# Patient Record
Sex: FEMALE | Race: BLACK OR AFRICAN AMERICAN | Employment: FULL TIME | URBAN - METROPOLITAN AREA
[De-identification: names, ages, dates, MRNs, and addresses within clinical notes are randomized per-mention and may not be internally consistent; named-entity substitution may affect disease eponyms.]

---

## 2017-01-19 ENCOUNTER — OFFICE VISIT (OUTPATIENT)
Dept: INTERNAL MEDICINE CLINIC | Facility: CLINIC | Age: 42
End: 2017-01-19

## 2017-01-19 VITALS
HEIGHT: 66 IN | WEIGHT: 217 LBS | BODY MASS INDEX: 34.87 KG/M2 | HEART RATE: 69 BPM | TEMPERATURE: 99 F | SYSTOLIC BLOOD PRESSURE: 118 MMHG | OXYGEN SATURATION: 97 % | DIASTOLIC BLOOD PRESSURE: 77 MMHG

## 2017-01-19 DIAGNOSIS — J40 BRONCHITIS: Primary | ICD-10-CM

## 2017-01-19 PROCEDURE — 99212 OFFICE O/P EST SF 10 MIN: CPT | Performed by: INTERNAL MEDICINE

## 2017-01-19 PROCEDURE — 99213 OFFICE O/P EST LOW 20 MIN: CPT | Performed by: INTERNAL MEDICINE

## 2017-01-19 RX ORDER — AZITHROMYCIN 250 MG/1
TABLET, FILM COATED ORAL
Qty: 6 TABLET | Refills: 0 | Status: SHIPPED | OUTPATIENT
Start: 2017-01-19 | End: 2017-12-08

## 2017-01-19 NOTE — PATIENT INSTRUCTIONS
What Is Acute Bronchitis? Acute or short-term bronchitis last for days or weeks. It occurs when the bronchial tubes (airways in the lungs) are irritated by a virus, bacteria, or allergen. This causes a cough that produces yellow or greenish mucus.   Insi · Tests to check for an underlying condition, such as allergies, asthma, or COPD. You may need to see a specialist for more lung function testing. Treating a cough  The main treatment for bronchitis is easing symptoms.  Avoiding smoke, allergens, and other · Wash your hands often. This helps reduce the chance of picking up viruses that cause colds and flu. Call your healthcare provider if:  · Symptoms worsen, or new symptoms develop. · Breathing problems worsen or  become severe.   · Symptoms don’t get bett A physical exam, health history, and certain tests help your healthcare provider make the diagnosis. Health history  Your healthcare provider will ask you about your symptoms. The exam  Your provider listens Estephanie Balbir chest for signs of congestion.  He or s · Ask your provider or pharmacist what side effects are common, and what to do about them. Follow-up care  You should see your provider again in 2 to 3 weeks. By this time, symptoms should have improved.  An infection that lasts longer may mean you have a

## 2017-01-19 NOTE — PROGRESS NOTES
HPI:    Patient ID: Vipin Tarango is a 46year old female. HPI she in  came today complaining of cough and congestion.   According to her started  last Monday initially with URI symptoms, congestion runny nose but her symptoms symptoms progressively ge Current Outpatient Prescriptions:  azithromycin 250 MG Oral Tab Take two tablets by mouth today, then one daily. Disp: 6 tablet Rfl: 0   Meloxicam 15 MG Oral Tab Take 1 tablet (15 mg total) by mouth daily.  Disp: 30 tablet Rfl: 1     Allergies:No Known Abdominal: Bowel sounds are normal. She exhibits no distension and no mass. There is no tenderness. There is no guarding. Musculoskeletal: Normal range of motion. She exhibits no edema or tenderness.    Lymphadenopathy:     She has no cervical adenopath

## 2017-11-23 ENCOUNTER — HOSPITAL ENCOUNTER (OUTPATIENT)
Age: 42
Discharge: HOME | End: 2017-11-23
Attending: FAMILY MEDICINE
Payer: COMMERCIAL

## 2017-11-23 ENCOUNTER — APPOINTMENT (OUTPATIENT)
Dept: GENERAL RADIOLOGY | Age: 42
End: 2017-11-23
Attending: FAMILY MEDICINE
Payer: COMMERCIAL

## 2017-11-23 VITALS
TEMPERATURE: 99 F | OXYGEN SATURATION: 100 % | HEART RATE: 77 BPM | BODY MASS INDEX: 36.16 KG/M2 | RESPIRATION RATE: 16 BRPM | DIASTOLIC BLOOD PRESSURE: 72 MMHG | HEIGHT: 66 IN | WEIGHT: 225 LBS | SYSTOLIC BLOOD PRESSURE: 115 MMHG
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
## 2017-11-23 DIAGNOSIS — M62.838 NECK MUSCLE SPASM: ICD-10-CM

## 2017-11-23 DIAGNOSIS — S46.912A SHOULDER STRAIN, LEFT, INITIAL ENCOUNTER: ICD-10-CM

## 2017-11-23 PROCEDURE — 84484 ASSAY OF TROPONIN QUANT: CPT

## 2017-11-23 PROCEDURE — 93005 ELECTROCARDIOGRAM TRACING: CPT

## 2017-11-23 PROCEDURE — 99205 OFFICE O/P NEW HI 60 MIN: CPT

## 2017-11-23 PROCEDURE — 99215 OFFICE O/P EST HI 40 MIN: CPT

## 2017-11-23 PROCEDURE — 85025 COMPLETE CBC W/AUTO DIFF WBC: CPT | Performed by: FAMILY MEDICINE

## 2017-11-23 PROCEDURE — 93010 ELECTROCARDIOGRAM REPORT: CPT

## 2017-11-23 PROCEDURE — 71020 XR CHEST PA + LAT CHEST (CPT=71020): CPT | Performed by: FAMILY MEDICINE

## 2017-11-23 PROCEDURE — 72050 X-RAY EXAM NECK SPINE 4/5VWS: CPT | Performed by: FAMILY MEDICINE

## 2017-11-23 PROCEDURE — 36415 COLL VENOUS BLD VENIPUNCTURE: CPT

## 2017-11-23 PROCEDURE — 80047 BASIC METABLC PNL IONIZED CA: CPT

## 2017-11-23 PROCEDURE — 93010 ELECTROCARDIOGRAM REPORT: CPT | Performed by: INTERNAL MEDICINE

## 2017-11-23 PROCEDURE — 73030 X-RAY EXAM OF SHOULDER: CPT | Performed by: FAMILY MEDICINE

## 2017-11-23 RX ORDER — NAPROXEN 375 MG/1
375 TABLET ORAL 2 TIMES DAILY WITH MEALS
Qty: 20 TABLET | Refills: 0 | Status: SHIPPED | OUTPATIENT
Start: 2017-11-23 | End: 2017-11-23

## 2017-11-23 RX ORDER — CYCLOBENZAPRINE HCL 10 MG
10 TABLET ORAL NIGHTLY
Qty: 7 TABLET | Refills: 0 | Status: SHIPPED | OUTPATIENT
Start: 2017-11-23 | End: 2017-11-30

## 2017-11-23 RX ORDER — NAPROXEN 375 MG/1
375 TABLET ORAL 2 TIMES DAILY WITH MEALS
Qty: 20 TABLET | Refills: 0 | Status: SHIPPED | OUTPATIENT
Start: 2017-11-23 | End: 2017-12-08

## 2017-11-23 RX ORDER — CYCLOBENZAPRINE HCL 10 MG
10 TABLET ORAL NIGHTLY
Qty: 7 TABLET | Refills: 0 | Status: SHIPPED | OUTPATIENT
Start: 2017-11-23 | End: 2017-11-23

## 2017-11-23 NOTE — ED PROVIDER NOTES
Patient Seen in: [facility]    History   Patient presents with:  Trauma (cardiovascular, musculoskeletal)  Upper Extremity Injury (musculoskeletal)  Headache (neurologic)  Head Neck Injury (neurologic, musculoskeletal)    S (5' 6\")   Wt 102.1 kg   LMP 11/07/2017   SpO2 100%   BMI 36.32 kg/m²         Physical Exam    General: Female in NAD  HEENT: NCAT, Bilateral TM clear. No hemotympanum. MMM  Neck: Mild TTP to the cervical spine. Mild TTP to the L>R Trapezius.  Limited ROM s 2:21 pm    Follow-up:  MD Elke Loja [phone] [phone]  [phone]    Go to  As needed, If symptoms worsen        Medications Prescribed:  Current Discharge Medication List    START taking these medications    naproxen 375 MG Oral Ta
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
## 2017-12-08 ENCOUNTER — OFFICE VISIT (OUTPATIENT)
Dept: INTERNAL MEDICINE CLINIC | Facility: CLINIC | Age: 42
End: 2017-12-08

## 2017-12-08 VITALS
RESPIRATION RATE: 18 BRPM | SYSTOLIC BLOOD PRESSURE: 114 MMHG | DIASTOLIC BLOOD PRESSURE: 72 MMHG | HEIGHT: 66 IN | WEIGHT: 226 LBS | HEART RATE: 83 BPM | BODY MASS INDEX: 36.32 KG/M2

## 2017-12-08 DIAGNOSIS — M54.2 CERVICALGIA: Primary | ICD-10-CM

## 2017-12-08 DIAGNOSIS — R42 VERTIGO: ICD-10-CM

## 2017-12-08 PROCEDURE — 99212 OFFICE O/P EST SF 10 MIN: CPT | Performed by: INTERNAL MEDICINE

## 2017-12-08 PROCEDURE — 99214 OFFICE O/P EST MOD 30 MIN: CPT | Performed by: INTERNAL MEDICINE

## 2017-12-08 RX ORDER — MELOXICAM 15 MG/1
15 TABLET ORAL DAILY
Qty: 30 TABLET | Refills: 0 | Status: SHIPPED | OUTPATIENT
Start: 2017-12-08 | End: 2018-01-21

## 2017-12-08 RX ORDER — TIZANIDINE 2 MG/1
2 TABLET ORAL NIGHTLY
Qty: 30 TABLET | Refills: 3 | Status: SHIPPED | OUTPATIENT
Start: 2017-12-08 | End: 2019-02-20
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
## 2017-12-09 NOTE — ASSESSMENT & PLAN NOTE
Persistent neck stiffness, limited range of movement. X-rays reviewed– reversal of the cervical lordosis seen. Muscle spasm seems to be the cause. She does have additional problems with neuroforaminal narrowing at C4-5 and 6.   Currently does not have an

## 2017-12-09 NOTE — PATIENT INSTRUCTIONS
Problem List Items Addressed This Visit        Unprioritized    Cervicalgia - Primary     Persistent neck stiffness, limited range of movement. X-rays reviewed– reversal of the cervical lordosis seen. Muscle spasm seems to be the cause.   She does have ad
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
## 2018-01-21 DIAGNOSIS — M54.2 CERVICALGIA: ICD-10-CM

## 2018-01-22 RX ORDER — MELOXICAM 15 MG/1
15 TABLET ORAL DAILY
Qty: 30 TABLET | Refills: 0 | Status: SHIPPED | OUTPATIENT
Start: 2018-01-22 | End: 2019-02-22

## 2018-03-22 ENCOUNTER — TELEPHONE (OUTPATIENT)
Dept: INTERNAL MEDICINE CLINIC | Facility: CLINIC | Age: 43
End: 2018-03-22

## 2018-03-22 DIAGNOSIS — R42 VERTIGO: ICD-10-CM

## 2018-03-22 DIAGNOSIS — M54.2 CERVICALGIA: Primary | ICD-10-CM

## 2018-03-22 NOTE — TELEPHONE ENCOUNTER
Dr Liz Rai, please see message below and advise regarding if okay to use SDS or MD approval slots for patient for follow up for further PT orders.

## 2018-03-22 NOTE — TELEPHONE ENCOUNTER
FYI: No available appt soon only SDS or MD approval is available, can we offer those appt?   Pls advise, thank you!!!

## 2018-03-22 NOTE — TELEPHONE ENCOUNTER
Patient was last seen on December 2017. This is March 22, 2018. She will need to be seen for any further orders.

## 2018-03-27 ENCOUNTER — OFFICE VISIT (OUTPATIENT)
Dept: PHYSICAL THERAPY | Facility: HOSPITAL | Age: 43
End: 2018-03-27
Attending: INTERNAL MEDICINE
Payer: COMMERCIAL

## 2018-03-27 DIAGNOSIS — R42 VERTIGO: ICD-10-CM

## 2018-03-27 DIAGNOSIS — M54.2 CERVICALGIA: ICD-10-CM

## 2018-03-27 PROCEDURE — 97161 PT EVAL LOW COMPLEX 20 MIN: CPT

## 2018-03-27 PROCEDURE — 97110 THERAPEUTIC EXERCISES: CPT

## 2018-03-27 NOTE — PROGRESS NOTES
CERVICAL SPINE EVALUATION:   Referring Physician: Alethea Castaneda MD    Date of Onset: 11/22/17  Date of Service: 3/27/18    Cervicalgia (M54.2)  Vertigo (R42)   SUBJECTIVE:   PATIENT SUMMARY:  Pretty Trevino is a 38year old y/o female who presents positive left neer's test. Pt scored 51%limitation on FOTO. Pt currently has difficulty with lifting and carrying, driving, reaching in front, exercising.    Gilford Lorenzo would benefit from skilled Physical Therapy to address the above impairments to improve fun >10# with minimal pain <2/10. Frequency/Duration: Patient will be seen for 2x/week or a total of 8 visits over a 90 day period.  Treatment will include: Manual Therapy, Neuromuscular Re-education, Therapeutic Activities, Therapeutic Exercise and Home Ex

## 2018-03-29 ENCOUNTER — OFFICE VISIT (OUTPATIENT)
Dept: PHYSICAL THERAPY | Facility: HOSPITAL | Age: 43
End: 2018-03-29
Attending: INTERNAL MEDICINE
Payer: COMMERCIAL

## 2018-03-29 PROCEDURE — 97110 THERAPEUTIC EXERCISES: CPT

## 2018-03-29 PROCEDURE — 97140 MANUAL THERAPY 1/> REGIONS: CPT

## 2018-03-29 NOTE — PROGRESS NOTES
Diagnosis: Cervicalgia (M54.2)  Authorized # of Visits:  2         Next MD visit: none scheduled  Fall Risk: standard         Precautions: n/a           Medication Changes since last visit?: No    Subjective: Pt states that neck and shoulder swelling have

## 2018-04-07 ENCOUNTER — TELEPHONE (OUTPATIENT)
Dept: PHYSICAL THERAPY | Facility: HOSPITAL | Age: 43
End: 2018-04-07

## 2018-04-10 ENCOUNTER — OFFICE VISIT (OUTPATIENT)
Dept: PHYSICAL THERAPY | Facility: HOSPITAL | Age: 43
End: 2018-04-10
Attending: INTERNAL MEDICINE
Payer: COMMERCIAL

## 2018-04-10 PROCEDURE — 97110 THERAPEUTIC EXERCISES: CPT

## 2018-04-10 PROCEDURE — 97140 MANUAL THERAPY 1/> REGIONS: CPT

## 2018-04-10 NOTE — PROGRESS NOTES
Diagnosis: Cervicalgia (M54.2)  Authorized # of Visits:  3         Next MD visit: none scheduled  Fall Risk: standard         Precautions: n/a           Medication Changes since last visit?: No    Subjective: Pt states that neck pain has been better and ha

## 2018-04-14 ENCOUNTER — OFFICE VISIT (OUTPATIENT)
Dept: PHYSICAL THERAPY | Facility: HOSPITAL | Age: 43
End: 2018-04-14
Attending: INTERNAL MEDICINE
Payer: COMMERCIAL

## 2018-04-14 PROCEDURE — 97110 THERAPEUTIC EXERCISES: CPT

## 2018-04-14 PROCEDURE — 97140 MANUAL THERAPY 1/> REGIONS: CPT

## 2018-04-14 NOTE — PROGRESS NOTES
Diagnosis: Cervicalgia (M54.2)  Authorized # of Visits:  4/12        Next MD visit: none scheduled  Fall Risk: standard         Precautions: n/a           Medication Changes since last visit?: No    Subjective: Pt states that neck pain has been better and Treatment Time: 41 min

## 2018-04-17 ENCOUNTER — OFFICE VISIT (OUTPATIENT)
Dept: PHYSICAL THERAPY | Facility: HOSPITAL | Age: 43
End: 2018-04-17
Attending: INTERNAL MEDICINE
Payer: COMMERCIAL

## 2018-04-17 PROCEDURE — 97110 THERAPEUTIC EXERCISES: CPT

## 2018-04-17 PROCEDURE — 97140 MANUAL THERAPY 1/> REGIONS: CPT

## 2018-04-18 NOTE — PROGRESS NOTES
Diagnosis: Cervicalgia (M54.2)  Authorized # of Visits:  5/12        Next MD visit: none scheduled  Fall Risk: standard         Precautions: n/a           Medication Changes since last visit?: No  Subjective: Reports her neck felt much better with rotation 41 min

## 2018-04-23 ENCOUNTER — APPOINTMENT (OUTPATIENT)
Dept: PHYSICAL THERAPY | Facility: HOSPITAL | Age: 43
End: 2018-04-23
Attending: INTERNAL MEDICINE
Payer: COMMERCIAL

## 2018-04-26 ENCOUNTER — OFFICE VISIT (OUTPATIENT)
Dept: PHYSICAL THERAPY | Facility: HOSPITAL | Age: 43
End: 2018-04-26
Attending: INTERNAL MEDICINE
Payer: COMMERCIAL

## 2018-04-26 PROCEDURE — 97140 MANUAL THERAPY 1/> REGIONS: CPT

## 2018-04-26 PROCEDURE — 97110 THERAPEUTIC EXERCISES: CPT

## 2018-04-26 NOTE — PROGRESS NOTES
Diagnosis: Cervicalgia (M54.2)  Authorized # of Visits: 12        Next MD visit: none scheduled  Fall Risk: standard         Precautions: n/a           Medication Changes since last visit?: No  Subjective: Moving neck better and has stiffness with mild martha

## 2018-05-03 ENCOUNTER — OFFICE VISIT (OUTPATIENT)
Dept: PHYSICAL THERAPY | Facility: HOSPITAL | Age: 43
End: 2018-05-03
Attending: INTERNAL MEDICINE
Payer: COMMERCIAL

## 2018-05-03 PROCEDURE — 97140 MANUAL THERAPY 1/> REGIONS: CPT

## 2018-05-03 PROCEDURE — 97110 THERAPEUTIC EXERCISES: CPT

## 2018-05-03 NOTE — PROGRESS NOTES
Diagnosis: Cervicalgia (M54.2)  Authorized # of Visits: 12        Next MD visit: none scheduled  Fall Risk: standard         Precautions: n/a           Medication Changes since last visit?: No  Subjective: (Pt arrived 15 min late to therapy session) Report min

## 2018-05-15 ENCOUNTER — OFFICE VISIT (OUTPATIENT)
Dept: PHYSICAL THERAPY | Facility: HOSPITAL | Age: 43
End: 2018-05-15
Attending: INTERNAL MEDICINE
Payer: COMMERCIAL

## 2018-05-15 PROCEDURE — 97140 MANUAL THERAPY 1/> REGIONS: CPT

## 2018-05-15 PROCEDURE — 97110 THERAPEUTIC EXERCISES: CPT

## 2018-05-15 NOTE — PROGRESS NOTES
Patient Name: Hossein Omalley, : 10/9/1975, MRN: [id number]   Date:  5/15/2018  Referring Physician:  Jose Arias    Diagnosis: Cervicalgia (M54.2)    Discharge Summary    Pt has attended 8, cancelled 2, and no shown 1 visits in Physical Therapy. demonstrate left shoulder abd/flex ROM >130 deg to be able to perform ADLs such as washing hair. (MET)  4.  Pt will demonstrate L shoulder/scapular strength >4/5 to be able to lift and carry >10# with minimal pain <2/10. (MET)    FOTO: 62/100 at DC, 49/100

## 2018-05-26 ENCOUNTER — APPOINTMENT (OUTPATIENT)
Dept: PHYSICAL THERAPY | Facility: HOSPITAL | Age: 43
End: 2018-05-26
Attending: INTERNAL MEDICINE
Payer: COMMERCIAL

## 2019-02-19 ENCOUNTER — NURSE TRIAGE (OUTPATIENT)
Dept: OTHER | Age: 44
End: 2019-02-19

## 2019-02-19 DIAGNOSIS — M54.2 CERVICALGIA: ICD-10-CM

## 2019-02-19 NOTE — TELEPHONE ENCOUNTER
Action Requested: Summary for Provider     []  Critical Lab, Recommendations Needed  [] Need Additional Advice  []   FYI    []   Need Orders  [] Need Medications Sent to Pharmacy  []  Other     SUMMARY: appt scheduled 2/20/19 w/ PA as no access today w/ an

## 2019-02-20 ENCOUNTER — OFFICE VISIT (OUTPATIENT)
Dept: INTERNAL MEDICINE CLINIC | Facility: CLINIC | Age: 44
End: 2019-02-20
Payer: COMMERCIAL

## 2019-02-20 ENCOUNTER — LAB ENCOUNTER (OUTPATIENT)
Dept: LAB | Facility: HOSPITAL | Age: 44
End: 2019-02-20
Attending: PHYSICIAN ASSISTANT
Payer: COMMERCIAL

## 2019-02-20 VITALS
HEIGHT: 66 IN | BODY MASS INDEX: 36.32 KG/M2 | WEIGHT: 226 LBS | SYSTOLIC BLOOD PRESSURE: 119 MMHG | DIASTOLIC BLOOD PRESSURE: 79 MMHG | HEART RATE: 70 BPM

## 2019-02-20 DIAGNOSIS — N92.0 MENORRHAGIA WITH REGULAR CYCLE: ICD-10-CM

## 2019-02-20 DIAGNOSIS — Z00.00 ROUTINE PHYSICAL EXAMINATION: Primary | ICD-10-CM

## 2019-02-20 DIAGNOSIS — R35.0 URINARY FREQUENCY: ICD-10-CM

## 2019-02-20 DIAGNOSIS — Z12.31 VISIT FOR SCREENING MAMMOGRAM: ICD-10-CM

## 2019-02-20 DIAGNOSIS — M25.50 PAIN IN JOINT, MULTIPLE SITES: ICD-10-CM

## 2019-02-20 DIAGNOSIS — Z00.00 ROUTINE PHYSICAL EXAMINATION: ICD-10-CM

## 2019-02-20 DIAGNOSIS — E55.9 VITAMIN D DEFICIENCY: ICD-10-CM

## 2019-02-20 PROBLEM — J40 BRONCHITIS: Status: RESOLVED | Noted: 2017-01-19 | Resolved: 2019-02-20

## 2019-02-20 LAB
ALBUMIN SERPL-MCNC: 3.9 G/DL (ref 3.4–5)
ALBUMIN/GLOB SERPL: 0.8 {RATIO} (ref 1–2)
ALP LIVER SERPL-CCNC: 79 U/L (ref 37–98)
ALT SERPL-CCNC: 19 U/L (ref 13–56)
ANION GAP SERPL CALC-SCNC: 7 MMOL/L (ref 0–18)
APPEARANCE: CLEAR
AST SERPL-CCNC: 18 U/L (ref 15–37)
BASOPHILS # BLD AUTO: 0.02 X10(3) UL (ref 0–0.2)
BASOPHILS NFR BLD AUTO: 0.2 %
BILIRUB SERPL-MCNC: 0.3 MG/DL (ref 0.1–2)
BILIRUBIN: NEGATIVE
BUN BLD-MCNC: 12 MG/DL (ref 7–18)
BUN/CREAT SERPL: 10.8 (ref 10–20)
CALCIUM BLD-MCNC: 9.8 MG/DL (ref 8.5–10.1)
CHLORIDE SERPL-SCNC: 108 MMOL/L (ref 98–107)
CHOLEST SMN-MCNC: 188 MG/DL (ref ?–200)
CO2 SERPL-SCNC: 27 MMOL/L (ref 21–32)
CREAT BLD-MCNC: 1.11 MG/DL (ref 0.55–1.02)
CRP SERPL-MCNC: 0.52 MG/DL (ref ?–0.3)
DEPRECATED RDW RBC AUTO: 46.4 FL (ref 35.1–46.3)
EOSINOPHIL # BLD AUTO: 0.03 X10(3) UL (ref 0–0.7)
EOSINOPHIL NFR BLD AUTO: 0.3 %
ERYTHROCYTE [DISTWIDTH] IN BLOOD BY AUTOMATED COUNT: 14.5 % (ref 11–15)
ERYTHROCYTE [SEDIMENTATION RATE] IN BLOOD: 44 MM/HR (ref 0–20)
GLOBULIN PLAS-MCNC: 4.7 G/DL (ref 2.8–4.4)
GLUCOSE (URINE DIPSTICK): NEGATIVE MG/DL
GLUCOSE BLD-MCNC: 99 MG/DL (ref 70–99)
HCT VFR BLD AUTO: 36.5 % (ref 35–48)
HDLC SERPL-MCNC: 75 MG/DL (ref 40–59)
HGB BLD-MCNC: 11.7 G/DL (ref 12–16)
IMM GRANULOCYTES # BLD AUTO: 0.03 X10(3) UL (ref 0–1)
IMM GRANULOCYTES NFR BLD: 0.3 %
KETONES (URINE DIPSTICK): NEGATIVE MG/DL
LDLC SERPL CALC-MCNC: 101 MG/DL (ref ?–100)
LEUKOCYTES: NEGATIVE
LYMPHOCYTES # BLD AUTO: 3.22 X10(3) UL (ref 1–4)
LYMPHOCYTES NFR BLD AUTO: 35.6 %
M PROTEIN MFR SERPL ELPH: 8.6 G/DL (ref 6.4–8.2)
MCH RBC QN AUTO: 28.2 PG (ref 26–34)
MCHC RBC AUTO-ENTMCNC: 32.1 G/DL (ref 31–37)
MCV RBC AUTO: 88 FL (ref 80–100)
MONOCYTES # BLD AUTO: 0.56 X10(3) UL (ref 0.1–1)
MONOCYTES NFR BLD AUTO: 6.2 %
MULTISTIX LOT#: NORMAL NUMERIC
NEUTROPHILS # BLD AUTO: 5.19 X10 (3) UL (ref 1.5–7.7)
NEUTROPHILS # BLD AUTO: 5.19 X10(3) UL (ref 1.5–7.7)
NEUTROPHILS NFR BLD AUTO: 57.4 %
NITRITE, URINE: NEGATIVE
NONHDLC SERPL-MCNC: 113 MG/DL (ref ?–130)
OCCULT BLOOD: NEGATIVE
OSMOLALITY SERPL CALC.SUM OF ELEC: 294 MOSM/KG (ref 275–295)
PH, URINE: 6.5 (ref 4.5–8)
PLATELET # BLD AUTO: 282 10(3)UL (ref 150–450)
POTASSIUM SERPL-SCNC: 4.1 MMOL/L (ref 3.5–5.1)
PROTEIN (URINE DIPSTICK): NEGATIVE MG/DL
RBC # BLD AUTO: 4.15 X10(6)UL (ref 3.8–5.3)
RHEUMATOID FACT SERPL-ACNC: <10 IU/ML (ref ?–15)
SODIUM SERPL-SCNC: 142 MMOL/L (ref 136–145)
SPECIFIC GRAVITY: 1.02 (ref 1–1.03)
TRIGL SERPL-MCNC: 59 MG/DL (ref 30–149)
TSI SER-ACNC: 1.5 MIU/ML (ref 0.36–3.74)
URINE-COLOR: YELLOW
UROBILINOGEN,SEMI-QN: NEGATIVE MG/DL (ref 0–1.9)
WBC # BLD AUTO: 9.1 X10(3) UL (ref 4–11)

## 2019-02-20 PROCEDURE — 85652 RBC SED RATE AUTOMATED: CPT

## 2019-02-20 PROCEDURE — 81002 URINALYSIS NONAUTO W/O SCOPE: CPT | Performed by: PHYSICIAN ASSISTANT

## 2019-02-20 PROCEDURE — 80053 COMPREHEN METABOLIC PANEL: CPT

## 2019-02-20 PROCEDURE — 86140 C-REACTIVE PROTEIN: CPT

## 2019-02-20 PROCEDURE — 36415 COLL VENOUS BLD VENIPUNCTURE: CPT

## 2019-02-20 PROCEDURE — 86038 ANTINUCLEAR ANTIBODIES: CPT

## 2019-02-20 PROCEDURE — 86431 RHEUMATOID FACTOR QUANT: CPT

## 2019-02-20 PROCEDURE — 99396 PREV VISIT EST AGE 40-64: CPT | Performed by: PHYSICIAN ASSISTANT

## 2019-02-20 PROCEDURE — 82306 VITAMIN D 25 HYDROXY: CPT

## 2019-02-20 PROCEDURE — 85025 COMPLETE CBC W/AUTO DIFF WBC: CPT

## 2019-02-20 PROCEDURE — 80061 LIPID PANEL: CPT

## 2019-02-20 PROCEDURE — 84443 ASSAY THYROID STIM HORMONE: CPT

## 2019-02-20 NOTE — PROGRESS NOTES
HPI:    Patient ID: Sheila Young is a 41year old female. HPI   Patient presents today requesting physical exam. Was last seen in the office on 12/8/17 for neck pain and vertigo. Since then has completed several sessions of PT and doing well.  Ov Positive for myalgias, back pain and joint pain. Negative for gait problem. Skin: Negative for rash. Neurological: Negative for weakness, light-headedness and headaches. Hematological: Negative for adenopathy.    Psychiatric/Behavioral: Negative for d palpation of multiple small joints of the hand and wrist.  Check fasting labs and contact patient with results. Health maintenance issues discussed.   Return to clinic in the near future for breast exam and pelvic exam with Pap as these were deferred today the past.  Recheck levels and treat accordingly. - VITAMIN D, 25-HYDROXY; Future    6. Visit for screening mammogram  Screening mammogram ordered. - [facility] SCREENING BILAT (CPT=77067);  Future      Orders Placed This Encounter      POC Urinalysis, Manual D

## 2019-02-20 NOTE — TELEPHONE ENCOUNTER
No Protocol on this med.         Future Appointments   Date Time Provider Joshua Brambila   2/20/2019  2:00 PM Florian Estevez [facility]         Requested Prescriptions     Pending Prescriptions Disp Refills   • Meloxicam 15 MG Oral Tab 30 table

## 2019-02-21 LAB — NUCLEAR IGG TITR SER IF: NEGATIVE {TITER}

## 2019-02-22 DIAGNOSIS — M54.2 CERVICALGIA: ICD-10-CM

## 2019-02-22 LAB — 25(OH)D3 SERPL-MCNC: 11.2 NG/ML (ref 30–100)

## 2019-02-23 RX ORDER — MELOXICAM 15 MG/1
15 TABLET ORAL DAILY
Qty: 30 TABLET | Refills: 0 | OUTPATIENT
Start: 2019-02-23

## 2019-02-23 RX ORDER — MELOXICAM 15 MG/1
15 TABLET ORAL DAILY
Qty: 30 TABLET | Refills: 0 | Status: SHIPPED | OUTPATIENT
Start: 2019-02-23 | End: 2019-06-06

## 2019-02-23 RX ORDER — ERGOCALCIFEROL 1.25 MG/1
50000 CAPSULE ORAL
Qty: 4 CAPSULE | Refills: 2 | OUTPATIENT
Start: 2019-02-23 | End: 2019-05-12

## 2019-02-23 RX ORDER — MELOXICAM 15 MG/1
15 TABLET ORAL DAILY
Qty: 30 TABLET | Refills: 0 | Status: SHIPPED | OUTPATIENT
Start: 2019-02-23 | End: 2019-03-22

## 2019-02-28 ENCOUNTER — TELEPHONE (OUTPATIENT)
Dept: INTERNAL MEDICINE CLINIC | Facility: CLINIC | Age: 44
End: 2019-02-28

## 2019-02-28 RX ORDER — ERGOCALCIFEROL 1.25 MG/1
50000 CAPSULE ORAL
Qty: 4 CAPSULE | Refills: 2 | Status: SHIPPED | OUTPATIENT
Start: 2019-02-28 | End: 2019-05-17

## 2019-02-28 NOTE — TELEPHONE ENCOUNTER
Pt called in stating medication below was sent to the wrong pharmacy and would like it re-sent to the [facility] (on chart)    Please advise.     Current Outpatient Medications:   •  ergocalciferol 94395 units Oral Cap, Take 1 capsule (50,000 Units

## 2019-03-22 DIAGNOSIS — M54.2 CERVICALGIA: ICD-10-CM

## 2019-03-23 NOTE — TELEPHONE ENCOUNTER
Please advise in regards to refill request. Thank You  Refill Protocol Appointment Criteria  · Appointment scheduled in the past 6 months or in the next 3 months  Recent Outpatient Visits            1 month ago Routine physical examination    [facility]

## 2019-03-28 RX ORDER — MELOXICAM 15 MG/1
TABLET ORAL
Qty: 30 TABLET | Refills: 5 | Status: SHIPPED | OUTPATIENT
Start: 2019-03-28 | End: 2019-05-31

## 2019-05-31 ENCOUNTER — HOSPITAL ENCOUNTER (OUTPATIENT)
Age: 44
Discharge: HOME | End: 2019-05-31
Attending: EMERGENCY MEDICINE
Payer: COMMERCIAL

## 2019-05-31 ENCOUNTER — NURSE TRIAGE (OUTPATIENT)
Dept: OTHER | Age: 44
End: 2019-05-31

## 2019-05-31 ENCOUNTER — APPOINTMENT (OUTPATIENT)
Dept: GENERAL RADIOLOGY | Age: 44
End: 2019-05-31
Attending: EMERGENCY MEDICINE
Payer: COMMERCIAL

## 2019-05-31 VITALS
DIASTOLIC BLOOD PRESSURE: 72 MMHG | HEART RATE: 73 BPM | HEIGHT: 66 IN | WEIGHT: 225 LBS | TEMPERATURE: 98 F | SYSTOLIC BLOOD PRESSURE: 125 MMHG | BODY MASS INDEX: 36.16 KG/M2 | OXYGEN SATURATION: 100 % | RESPIRATION RATE: 22 BRPM

## 2019-05-31 DIAGNOSIS — S93.401A SPRAIN OF RIGHT ANKLE, UNSPECIFIED LIGAMENT, INITIAL ENCOUNTER: Primary | ICD-10-CM

## 2019-05-31 PROCEDURE — 73610 X-RAY EXAM OF ANKLE: CPT | Performed by: EMERGENCY MEDICINE

## 2019-05-31 PROCEDURE — 99213 OFFICE O/P EST LOW 20 MIN: CPT

## 2019-05-31 PROCEDURE — 73630 X-RAY EXAM OF FOOT: CPT | Performed by: EMERGENCY MEDICINE

## 2019-05-31 RX ORDER — IBUPROFEN 600 MG/1
600 TABLET ORAL ONCE
Status: COMPLETED | OUTPATIENT
Start: 2019-05-31 | End: 2019-05-31

## 2019-05-31 NOTE — TELEPHONE ENCOUNTER
Action Requested: Summary for Provider     []  Critical Lab, Recommendations Needed  [] Need Additional Advice  []   FYI    []   Need Orders  [] Need Medications Sent to Pharmacy  []  Other     SUMMARY:  Transferred to [facility] care visit cooridinator for

## 2019-05-31 NOTE — ED PROVIDER NOTES
Patient Seen in: [address]    History   Patient presents with:  Lower Extremity Injury (musculoskeletal)    Stated Complaint:     HPI    Patient is a 65-year-old female who presents to immediate care complaining of acut Cardiovascular: Normal rate, regular rhythm and normal heart sounds. No murmur heard. Pulmonary/Chest: Effort normal and breath sounds normal.   Abdominal: Soft. Musculoskeletal: Normal range of motion. Right foot: There is tenderness.  There is

## 2019-05-31 NOTE — ED INITIAL ASSESSMENT (HPI)
Pt presents to the IC with c/o right ankle pain s/p working out and jumping off a step. Pt notes icing it immediately but heard a crack.

## 2019-06-04 NOTE — TELEPHONE ENCOUNTER
Call center please call and schedule an appointment. Thank You. ED/IC AVS (Printed 5/31/2019)                   Follow-Ups: Follow up with Bianca Gillette MD (Internal Medicine) in 1 week (6/7/2019);  As needed

## 2019-06-05 DIAGNOSIS — M54.2 CERVICALGIA: ICD-10-CM

## 2019-06-05 NOTE — TELEPHONE ENCOUNTER
Dr Benites=rima Du Rn Triage 44 minutes ago (9:06 AM)     Pt declined an appt at the time.      Routing comment

## 2019-06-05 NOTE — TELEPHONE ENCOUNTER
Pt is requesting a refill on :      Current Outpatient Medications:  Meloxicam 15 MG Oral Tab Take 1 tablet (15 mg total) by mouth daily.  Disp: 30 tablet Rfl: 0

## 2019-06-06 RX ORDER — MELOXICAM 15 MG/1
15 TABLET ORAL DAILY
Qty: 90 TABLET | Refills: 1 | Status: SHIPPED | OUTPATIENT
Start: 2019-06-06 | End: 2020-03-17

## 2019-06-06 NOTE — TELEPHONE ENCOUNTER
Refill Protocol Appointment Criteria  · Appointment scheduled in the past 6 months or in the next 3 months  Recent Outpatient Visits            3 months ago Routine physical examination    [facility] [place], [facility], [address], [facility]

## 2019-07-20 ENCOUNTER — NURSE TRIAGE (OUTPATIENT)
Dept: OTHER | Age: 44
End: 2019-07-20

## 2019-07-20 NOTE — TELEPHONE ENCOUNTER
Action Requested: Summary for Provider     []  Critical Lab, Recommendations Needed  [] Need Additional Advice  []   FYI    []   Need Orders  [] Need Medications Sent to Pharmacy  []  Other     SUMMARY:   Patient called stating she sprained her right ankle

## 2019-07-22 ENCOUNTER — TELEPHONE (OUTPATIENT)
Dept: INTERNAL MEDICINE CLINIC | Facility: CLINIC | Age: 44
End: 2019-07-22

## 2019-07-22 ENCOUNTER — OFFICE VISIT (OUTPATIENT)
Dept: INTERNAL MEDICINE CLINIC | Facility: CLINIC | Age: 44
End: 2019-07-22
Payer: COMMERCIAL

## 2019-07-22 ENCOUNTER — HOSPITAL ENCOUNTER (OUTPATIENT)
Dept: GENERAL RADIOLOGY | Facility: HOSPITAL | Age: 44
Discharge: HOME | End: 2019-07-22
Attending: NURSE PRACTITIONER
Payer: COMMERCIAL

## 2019-07-22 ENCOUNTER — APPOINTMENT (OUTPATIENT)
Dept: LAB | Facility: HOSPITAL | Age: 44
End: 2019-07-22
Attending: NURSE PRACTITIONER
Payer: COMMERCIAL

## 2019-07-22 VITALS
HEIGHT: 66 IN | SYSTOLIC BLOOD PRESSURE: 113 MMHG | HEART RATE: 61 BPM | WEIGHT: 234 LBS | BODY MASS INDEX: 37.61 KG/M2 | DIASTOLIC BLOOD PRESSURE: 73 MMHG

## 2019-07-22 DIAGNOSIS — M25.561 PAIN IN JOINT OF RIGHT KNEE: ICD-10-CM

## 2019-07-22 DIAGNOSIS — E55.9 VITAMIN D DEFICIENCY DISEASE: ICD-10-CM

## 2019-07-22 DIAGNOSIS — M25.561 PAIN IN JOINT OF RIGHT KNEE: Primary | ICD-10-CM

## 2019-07-22 DIAGNOSIS — E55.9 VITAMIN D DEFICIENCY: ICD-10-CM

## 2019-07-22 DIAGNOSIS — M25.561 KNEE PAIN, RIGHT ANTERIOR: ICD-10-CM

## 2019-07-22 LAB
25(OH)D3 SERPL-MCNC: 11.7 NG/ML (ref 30–100)
CRP SERPL-MCNC: 0.47 MG/DL (ref ?–0.3)
ERYTHROCYTE [SEDIMENTATION RATE] IN BLOOD: 35 MM/HR (ref 0–20)

## 2019-07-22 PROCEDURE — 86140 C-REACTIVE PROTEIN: CPT

## 2019-07-22 PROCEDURE — 99212 OFFICE O/P EST SF 10 MIN: CPT | Performed by: NURSE PRACTITIONER

## 2019-07-22 PROCEDURE — 36415 COLL VENOUS BLD VENIPUNCTURE: CPT

## 2019-07-22 PROCEDURE — 85652 RBC SED RATE AUTOMATED: CPT

## 2019-07-22 PROCEDURE — 82306 VITAMIN D 25 HYDROXY: CPT

## 2019-07-22 PROCEDURE — 73560 X-RAY EXAM OF KNEE 1 OR 2: CPT | Performed by: NURSE PRACTITIONER

## 2019-07-22 NOTE — ASSESSMENT & PLAN NOTE
A/P Patient complains of right knee pain and ankle swelling. She recently sprained her right ankle the first week in June. Mild edema noted right ankle. Complains of joint swelling and pain.   Since she was off of her meloxicam she needs to take meloxicam

## 2019-07-22 NOTE — PROGRESS NOTES
HPI:    Patient ID: Parag Westbrook is a 41year old female. HPI  Patient states that she sprained her right ankle first week in June. She was in exercise class and she jumped off a step and heard a pop.  She states the pain in her right ankle is ge The patient is not nervous/anxious. Current Outpatient Medications:  Meloxicam 15 MG Oral Tab Take 1 tablet (15 mg total) by mouth daily.  Disp: 90 tablet Rfl: 1     Allergies:No Known Allergies   PHYSICAL EXAM:   Physical Exam    Constitution joint of right knee    -  Primary    Relevant Orders    SED RATE, WESTERGREN (AUTOMATED) (Completed)    XR KNEE (1 OR 2 VIEWS), RIGHT (CPT=73560) (Completed)    C-REACTIVE PROTEIN (Completed)    Vitamin D deficiency disease        Relevant Orders    VITAMI

## 2019-07-23 NOTE — TELEPHONE ENCOUNTER
Can Best    Left message that I will put her lab results on my chart and that she has a prescription for Vitamin D 50,000 units once a week for 12 weeks.     Willow Severs, ANP  Working with Dr. Sybil Long

## 2020-03-17 ENCOUNTER — NURSE TRIAGE (OUTPATIENT)
Dept: INTERNAL MEDICINE CLINIC | Facility: CLINIC | Age: 45
End: 2020-03-17

## 2020-03-17 DIAGNOSIS — M54.2 CERVICALGIA: ICD-10-CM

## 2020-03-17 RX ORDER — FLUTICASONE PROPIONATE 50 MCG
SPRAY, SUSPENSION (ML) NASAL
Qty: 1 BOTTLE | Refills: 0 | Status: SHIPPED | OUTPATIENT
Start: 2020-03-17 | End: 2020-04-13

## 2020-03-17 RX ORDER — AZITHROMYCIN 250 MG/1
TABLET, FILM COATED ORAL
Qty: 1 PACKAGE | Refills: 0 | Status: SHIPPED | OUTPATIENT
Start: 2020-03-17 | End: 2020-04-20

## 2020-03-17 RX ORDER — MELOXICAM 15 MG/1
15 TABLET ORAL DAILY
Qty: 90 TABLET | Refills: 0 | Status: SHIPPED | OUTPATIENT
Start: 2020-03-17 | End: 2020-06-10

## 2020-03-17 NOTE — TELEPHONE ENCOUNTER
Jeni as directed. Zyrtec over-the-counter–10 mg 1 tablet once daily for 4 weeks. Flonase 1 puff each nostril 2 times daily for 4 weeks.

## 2020-03-17 NOTE — TELEPHONE ENCOUNTER
Action Requested: Summary for Provider     []  Critical Lab, Recommendations Needed  [] Need Additional Advice  []   FYI    []   Need Orders  [x] Need Medications Sent to Pharmacy  []  Other     SUMMARY: Patient is requesting a medication for her headache

## 2020-03-17 NOTE — TELEPHONE ENCOUNTER
Pt was called and informed of your message below and she verbalized understanding. Medication have been send to her pharmacy.

## 2020-04-13 RX ORDER — FLUTICASONE PROPIONATE 50 MCG
SPRAY, SUSPENSION (ML) NASAL
Qty: 3 BOTTLE | Refills: 0 | Status: SHIPPED | OUTPATIENT
Start: 2020-04-13 | End: 2020-08-05

## 2020-04-20 ENCOUNTER — NURSE TRIAGE (OUTPATIENT)
Dept: INTERNAL MEDICINE CLINIC | Facility: CLINIC | Age: 45
End: 2020-04-20

## 2020-04-20 DIAGNOSIS — R05.9 COUGH: Primary | ICD-10-CM

## 2020-04-20 DIAGNOSIS — Z20.822 SUSPECTED COVID-19 VIRUS INFECTION: ICD-10-CM

## 2020-04-20 PROCEDURE — 99213 OFFICE O/P EST LOW 20 MIN: CPT | Performed by: INTERNAL MEDICINE

## 2020-04-20 RX ORDER — LEVOCETIRIZINE DIHYDROCHLORIDE 5 MG/1
5 TABLET, FILM COATED ORAL EVERY EVENING
Qty: 30 TABLET | Refills: 3 | Status: SHIPPED | OUTPATIENT
Start: 2020-04-20 | End: 2020-07-13

## 2020-04-20 RX ORDER — AZITHROMYCIN 250 MG/1
TABLET, FILM COATED ORAL
Qty: 6 TABLET | Refills: 0 | Status: SHIPPED | OUTPATIENT
Start: 2020-04-20 | End: 2020-04-25

## 2020-04-20 NOTE — TELEPHONE ENCOUNTER
Action Requested: Summary for Provider     []  Critical Lab, Recommendations Needed  [x] Need Additional Advice  []   FYI    []   Need Orders  [] Need Medications Sent to Pharmacy  []  Other     SUMMARY: pt thinks she may have another sinus infection, stat

## 2020-04-20 NOTE — TELEPHONE ENCOUNTER
Virtual Telephone Check-In    Luz Maria Levels verbally consents to a Virtual/Telephone Check-In visit on 04/20/20.     Patient understands and accepts financial responsibility for any deductible, co-insurance and/or co-pays associated with this service

## 2020-04-20 NOTE — ASSESSMENT & PLAN NOTE
Symptoms of headache, sinus pressure, postnasal drip and a mild sore throat that started about 5 days back. Gradually worsened and has been having persistent hacking dry cough for the past 2 days. Mild sore throat associated.   Has been having photophobia

## 2020-04-21 ENCOUNTER — LAB ENCOUNTER (OUTPATIENT)
Dept: LAB | Facility: HOSPITAL | Age: 45
End: 2020-04-21
Attending: INTERNAL MEDICINE
Payer: COMMERCIAL

## 2020-04-21 DIAGNOSIS — R05.9 COUGH: ICD-10-CM

## 2020-04-21 DIAGNOSIS — Z20.822 SUSPECTED COVID-19 VIRUS INFECTION: ICD-10-CM

## 2020-06-10 DIAGNOSIS — M54.2 CERVICALGIA: ICD-10-CM

## 2020-06-10 RX ORDER — MELOXICAM 15 MG/1
TABLET ORAL
Qty: 90 TABLET | Refills: 0 | Status: SHIPPED | OUTPATIENT
Start: 2020-06-10 | End: 2020-08-10

## 2020-07-13 RX ORDER — LEVOCETIRIZINE DIHYDROCHLORIDE 5 MG/1
TABLET, FILM COATED ORAL
Qty: 90 TABLET | Refills: 1 | Status: SHIPPED | OUTPATIENT
Start: 2020-07-13 | End: 2021-03-18 | Stop reason: ALTCHOICE

## 2020-07-17 ENCOUNTER — OFFICE VISIT (OUTPATIENT)
Dept: FAMILY MEDICINE CLINIC | Facility: CLINIC | Age: 45
End: 2020-07-17
Payer: COMMERCIAL

## 2020-07-17 ENCOUNTER — NURSE TRIAGE (OUTPATIENT)
Dept: INTERNAL MEDICINE CLINIC | Facility: CLINIC | Age: 45
End: 2020-07-17

## 2020-07-17 VITALS
SYSTOLIC BLOOD PRESSURE: 116 MMHG | TEMPERATURE: 97 F | BODY MASS INDEX: 38.41 KG/M2 | HEART RATE: 77 BPM | HEIGHT: 66 IN | WEIGHT: 239 LBS | DIASTOLIC BLOOD PRESSURE: 78 MMHG

## 2020-07-17 DIAGNOSIS — E66.9 CLASS 2 OBESITY WITHOUT SERIOUS COMORBIDITY WITH BODY MASS INDEX (BMI) OF 38.0 TO 38.9 IN ADULT, UNSPECIFIED OBESITY TYPE: ICD-10-CM

## 2020-07-17 DIAGNOSIS — M17.11 OSTEOARTHRITIS OF RIGHT KNEE, UNSPECIFIED OSTEOARTHRITIS TYPE: ICD-10-CM

## 2020-07-17 DIAGNOSIS — M25.561 CHRONIC PAIN OF RIGHT KNEE: Primary | ICD-10-CM

## 2020-07-17 DIAGNOSIS — G89.29 CHRONIC PAIN OF RIGHT KNEE: Primary | ICD-10-CM

## 2020-07-17 PROCEDURE — 3074F SYST BP LT 130 MM HG: CPT | Performed by: STUDENT IN AN ORGANIZED HEALTH CARE EDUCATION/TRAINING PROGRAM

## 2020-07-17 PROCEDURE — 99214 OFFICE O/P EST MOD 30 MIN: CPT | Performed by: STUDENT IN AN ORGANIZED HEALTH CARE EDUCATION/TRAINING PROGRAM

## 2020-07-17 PROCEDURE — 3008F BODY MASS INDEX DOCD: CPT | Performed by: STUDENT IN AN ORGANIZED HEALTH CARE EDUCATION/TRAINING PROGRAM

## 2020-07-17 PROCEDURE — 3078F DIAST BP <80 MM HG: CPT | Performed by: STUDENT IN AN ORGANIZED HEALTH CARE EDUCATION/TRAINING PROGRAM

## 2020-07-17 RX ORDER — IBUPROFEN 800 MG/1
800 TABLET ORAL EVERY 6 HOURS PRN
Qty: 45 TABLET | Refills: 0 | Status: SHIPPED | OUTPATIENT
Start: 2020-07-17 | End: 2020-10-08

## 2020-07-17 NOTE — PROGRESS NOTES
HPI:    Patient ID: Keila Diaz is a 45year old female. HPI  Pt presenting with acute on chronic Right knee pain.  Pt has history of osteoarthritis, has been taking Meloxicam daily but notes that anterior knee pain has worsened over the last 3 Normocephalic and atraumatic.       Right Ear: External ear normal.      Left Ear: External ear normal.      Nose: Nose normal.      Mouth/Throat:      Mouth: Mucous membranes are moist.   Eyes:      Conjunctiva/sclera: Conjunctivae normal.   Neck:      Mus 500mg for pain relief  - referral to PMR to discuss continued pain management  - ibuprofen 800 MG Oral Tab; Take 1 tablet (800 mg total) by mouth every 6 (six) hours as needed for Pain. Dispense: 45 tablet; Refill: 0  - PHYSIATRY - INTERNAL    2.  Michelle Brooks

## 2020-07-17 NOTE — TELEPHONE ENCOUNTER
Action Requested: Summary for Provider     []  Critical Lab, Recommendations Needed  [] Need Additional Advice  []   FYI    []   Need Orders  [] Need Medications Sent to Pharmacy  []  Other     SUMMARY: Pt has chronic right knee pain and she takes MELOXICA

## 2020-07-20 PROBLEM — E66.9 OBESITY (BMI 35.0-39.9 WITHOUT COMORBIDITY): Status: ACTIVE | Noted: 2020-07-20

## 2020-08-05 ENCOUNTER — HOSPITAL ENCOUNTER (OUTPATIENT)
Dept: GENERAL RADIOLOGY | Facility: HOSPITAL | Age: 45
Discharge: HOME | End: 2020-08-05
Attending: PHYSICAL MEDICINE & REHABILITATION
Payer: COMMERCIAL

## 2020-08-05 ENCOUNTER — TELEPHONE (OUTPATIENT)
Dept: NEUROLOGY | Facility: CLINIC | Age: 45
End: 2020-08-05

## 2020-08-05 ENCOUNTER — OFFICE VISIT (OUTPATIENT)
Dept: NEUROLOGY | Facility: CLINIC | Age: 45
End: 2020-08-05
Payer: COMMERCIAL

## 2020-08-05 VITALS — BODY MASS INDEX: 36.96 KG/M2 | WEIGHT: 230 LBS | HEIGHT: 66 IN

## 2020-08-05 DIAGNOSIS — E66.9 OBESITY (BMI 35.0-39.9 WITHOUT COMORBIDITY): ICD-10-CM

## 2020-08-05 DIAGNOSIS — M17.0 PRIMARY OSTEOARTHRITIS OF KNEES, BILATERAL: ICD-10-CM

## 2020-08-05 DIAGNOSIS — M22.41 CHONDROMALACIA, PATELLA, RIGHT: Primary | ICD-10-CM

## 2020-08-05 PROCEDURE — 99204 OFFICE O/P NEW MOD 45 MIN: CPT | Performed by: PHYSICAL MEDICINE & REHABILITATION

## 2020-08-05 PROCEDURE — 73564 X-RAY EXAM KNEE 4 OR MORE: CPT | Performed by: PHYSICAL MEDICINE & REHABILITATION

## 2020-08-05 PROCEDURE — 3008F BODY MASS INDEX DOCD: CPT | Performed by: PHYSICAL MEDICINE & REHABILITATION

## 2020-08-05 NOTE — PATIENT INSTRUCTIONS
-My office will call once the injection is approved  -Start PT and home exercises  -Mobic and NSAIDs as needed  -Ice the knee daily 2-3x   -Turmeric and Gluocosamine/Chondroitin 1500/1200mg  -Xray of both knees today on the way out

## 2020-08-05 NOTE — TELEPHONE ENCOUNTER
Right knee aspiration and injection with corticosteroid CPT Code: 83746, , 65-APPROVED  Called [facility] for authorization of approval for above. Spoke to Thompson RATLIFF who states no authorization is required.    Reference call# [id number]   Will inform EN

## 2020-08-06 PROBLEM — M22.41 CHONDROMALACIA, PATELLA, RIGHT: Status: ACTIVE | Noted: 2020-08-06

## 2020-08-06 PROBLEM — M17.0 PRIMARY OSTEOARTHRITIS OF KNEES, BILATERAL: Status: ACTIVE | Noted: 2020-08-06

## 2020-08-06 NOTE — PROGRESS NOTES
[address] [facility]  NEW PATIENT EVALUATION    Consultation as a request of Dr. Bar Diaz    Chief Complaint: Knee pain    HISTORY OF PRESENT ILLNESS:   Patient presents with:  Knee Pain: Patient presents today c/o martha as well. She has been icing and taking meloxicam without any significant improvement. She had x-ray done in 2018 osteoarthritis.       PAST MEDICAL HISTORY:     Past Medical History:   Diagnosis Date   • Lipid screening 8/26/2011         PAST SURGICAL HIS negative for numbness/tingling, negative for weakness   Behavioral/Psych: negative for anxiety and depression  Endocrine: negative for diabetic symptoms including blurry vision, polydipsia, polyphagia and polyuria  Allergic/Immunologic: negative for urtica Lab Results   Component Value Date    GLU 99 02/20/2019    BUN 12 02/20/2019    BUNCREA 10.8 02/20/2019    CREATSERUM 1.11 (H) 02/20/2019    ANIONGAP 7 02/20/2019    GFRNAA 62 02/20/2019    GFRAA 71 02/20/2019    CA 9.8 02/20/2019    OSMOCALC 294 02/20 call once the injection is approved  -Start PT and home exercises  -Mobic and NSAIDs as needed  -Ice the knee daily 2-3x   -Turmeric and Gluocosamine/Chondroitin 1500/1200mg  -Xray of both knees today on the way out     The patient verbalized understanding

## 2020-08-10 ENCOUNTER — OFFICE VISIT (OUTPATIENT)
Dept: NEUROLOGY | Facility: CLINIC | Age: 45
End: 2020-08-10
Payer: COMMERCIAL

## 2020-08-10 ENCOUNTER — TELEPHONE (OUTPATIENT)
Dept: NEUROLOGY | Facility: CLINIC | Age: 45
End: 2020-08-10

## 2020-08-10 ENCOUNTER — MED REC SCAN ONLY (OUTPATIENT)
Dept: NEUROLOGY | Facility: CLINIC | Age: 45
End: 2020-08-10

## 2020-08-10 VITALS — HEIGHT: 66 IN | WEIGHT: 230 LBS | BODY MASS INDEX: 36.96 KG/M2

## 2020-08-10 DIAGNOSIS — E66.9 OBESITY (BMI 35.0-39.9 WITHOUT COMORBIDITY): ICD-10-CM

## 2020-08-10 DIAGNOSIS — M17.0 PRIMARY OSTEOARTHRITIS OF KNEES, BILATERAL: ICD-10-CM

## 2020-08-10 DIAGNOSIS — M22.41 CHONDROMALACIA, PATELLA, RIGHT: Primary | ICD-10-CM

## 2020-08-10 PROCEDURE — 20610 DRAIN/INJ JOINT/BURSA W/O US: CPT | Performed by: PHYSICAL MEDICINE & REHABILITATION

## 2020-08-10 PROCEDURE — 3008F BODY MASS INDEX DOCD: CPT | Performed by: PHYSICAL MEDICINE & REHABILITATION

## 2020-08-10 NOTE — TELEPHONE ENCOUNTER
Spoke to patient and she will call us back to schedule this appt. She needs to look at her calendar.

## 2020-08-10 NOTE — PROCEDURES
Procedure:  Right knee aspiration and injection with corticosteroid  The risks, benefits and anticipated outcomes of the procedure, the risks and benefits of the alternatives to the procedure, and the roles and tasks of the personnel to be involved, were d

## 2020-08-19 ENCOUNTER — NURSE TRIAGE (OUTPATIENT)
Dept: INTERNAL MEDICINE CLINIC | Facility: CLINIC | Age: 45
End: 2020-08-19

## 2020-08-19 NOTE — TELEPHONE ENCOUNTER
Action Requested: Summary for Provider     []  Critical Lab, Recommendations Needed  [] Need Additional Advice  []   FYI    []   Need Orders  [] Need Medications Sent to Pharmacy  []  Other     SUMMARY:    Please advise.     The patient stated had on August

## 2020-08-20 NOTE — TELEPHONE ENCOUNTER
Please have patient follow-up with 1 of the other providers–wayne flores. Ideally she should be following up with gynecology instead.

## 2020-08-20 NOTE — TELEPHONE ENCOUNTER
Spoke with patient (name and  verified) . Informed of message below. Patient scheduled with GOOD Merino tomorrow morning at [facility] location.     Routed as Debra Carbajal

## 2020-08-21 ENCOUNTER — LAB ENCOUNTER (OUTPATIENT)
Dept: LAB | Age: 45
End: 2020-08-21
Attending: INTERNAL MEDICINE
Payer: COMMERCIAL

## 2020-08-21 ENCOUNTER — OFFICE VISIT (OUTPATIENT)
Dept: INTERNAL MEDICINE CLINIC | Facility: CLINIC | Age: 45
End: 2020-08-21
Payer: COMMERCIAL

## 2020-08-21 VITALS
WEIGHT: 231.63 LBS | HEART RATE: 77 BPM | TEMPERATURE: 98 F | SYSTOLIC BLOOD PRESSURE: 113 MMHG | BODY MASS INDEX: 37.23 KG/M2 | HEIGHT: 66 IN | DIASTOLIC BLOOD PRESSURE: 76 MMHG

## 2020-08-21 DIAGNOSIS — E55.9 VITAMIN D DEFICIENCY: ICD-10-CM

## 2020-08-21 DIAGNOSIS — N93.9 ABNORMAL VAGINAL BLEEDING: Primary | ICD-10-CM

## 2020-08-21 DIAGNOSIS — N93.9 ABNORMAL VAGINAL BLEEDING: ICD-10-CM

## 2020-08-21 LAB
B-HCG SERPL-ACNC: <1 MIU/ML
BASOPHILS # BLD AUTO: 0.03 X10(3) UL (ref 0–0.2)
BASOPHILS NFR BLD AUTO: 0.3 %
DEPRECATED RDW RBC AUTO: 49.5 FL (ref 35.1–46.3)
EOSINOPHIL # BLD AUTO: 0.06 X10(3) UL (ref 0–0.7)
EOSINOPHIL NFR BLD AUTO: 0.6 %
ERYTHROCYTE [DISTWIDTH] IN BLOOD BY AUTOMATED COUNT: 16.3 % (ref 11–15)
HCT VFR BLD AUTO: 31.5 % (ref 35–48)
HGB BLD-MCNC: 9.7 G/DL (ref 12–16)
IMM GRANULOCYTES # BLD AUTO: 0.03 X10(3) UL (ref 0–1)
IMM GRANULOCYTES NFR BLD: 0.3 %
LYMPHOCYTES # BLD AUTO: 3.13 X10(3) UL (ref 1–4)
LYMPHOCYTES NFR BLD AUTO: 28.8 %
MCH RBC QN AUTO: 25.8 PG (ref 26–34)
MCHC RBC AUTO-ENTMCNC: 30.8 G/DL (ref 31–37)
MCV RBC AUTO: 83.8 FL (ref 80–100)
MONOCYTES # BLD AUTO: 0.78 X10(3) UL (ref 0.1–1)
MONOCYTES NFR BLD AUTO: 7.2 %
NEUTROPHILS # BLD AUTO: 6.82 X10 (3) UL (ref 1.5–7.7)
NEUTROPHILS # BLD AUTO: 6.82 X10(3) UL (ref 1.5–7.7)
NEUTROPHILS NFR BLD AUTO: 62.8 %
PLATELET # BLD AUTO: 321 10(3)UL (ref 150–450)
RBC # BLD AUTO: 3.76 X10(6)UL (ref 3.8–5.3)
WBC # BLD AUTO: 10.9 X10(3) UL (ref 4–11)

## 2020-08-21 PROCEDURE — 85025 COMPLETE CBC W/AUTO DIFF WBC: CPT

## 2020-08-21 PROCEDURE — 84702 CHORIONIC GONADOTROPIN TEST: CPT

## 2020-08-21 PROCEDURE — 3008F BODY MASS INDEX DOCD: CPT | Performed by: PHYSICIAN ASSISTANT

## 2020-08-21 PROCEDURE — 82306 VITAMIN D 25 HYDROXY: CPT

## 2020-08-21 PROCEDURE — 36415 COLL VENOUS BLD VENIPUNCTURE: CPT

## 2020-08-21 PROCEDURE — 3078F DIAST BP <80 MM HG: CPT | Performed by: PHYSICIAN ASSISTANT

## 2020-08-21 PROCEDURE — 99214 OFFICE O/P EST MOD 30 MIN: CPT | Performed by: PHYSICIAN ASSISTANT

## 2020-08-21 PROCEDURE — 3074F SYST BP LT 130 MM HG: CPT | Performed by: PHYSICIAN ASSISTANT

## 2020-08-21 NOTE — PROGRESS NOTES
HPI:    Patient ID: Cece Price is a 45year old female. HPI   Pt presents with intermittent vaginal bleeding, states her menstrual cycle ended on 8/5. She had a steroid shot of the knee on 8/10.  A few days after she noted small pink particles 97.7 °F (36.5 °C) (Temporal)   Ht 5' 6\" (1.676 m)   Wt 231 lb 9.6 oz (105.1 kg)   LMP 08/05/2020 (Exact Date)   BMI 37.38 kg/m²   Body mass index is 37.38 kg/m². Physical Exam    Constitutional: She is oriented to person, place, and time and thin.  She ap

## 2020-08-24 LAB — 25(OH)D3 SERPL-MCNC: 22.9 NG/ML (ref 30–100)

## 2020-09-04 DIAGNOSIS — M54.2 CERVICALGIA: ICD-10-CM

## 2020-09-05 RX ORDER — MELOXICAM 15 MG/1
TABLET ORAL
Qty: 90 TABLET | Refills: 0 | OUTPATIENT
Start: 2020-09-05

## 2020-10-07 DIAGNOSIS — G89.29 CHRONIC PAIN OF RIGHT KNEE: ICD-10-CM

## 2020-10-07 DIAGNOSIS — M25.561 CHRONIC PAIN OF RIGHT KNEE: ICD-10-CM

## 2020-10-08 RX ORDER — IBUPROFEN 800 MG/1
TABLET ORAL
Qty: 45 TABLET | Refills: 0 | Status: SHIPPED | OUTPATIENT
Start: 2020-10-08 | End: 2021-02-13

## 2020-12-11 ENCOUNTER — TELEMEDICINE (OUTPATIENT)
Dept: INTERNAL MEDICINE CLINIC | Facility: CLINIC | Age: 45
End: 2020-12-11
Payer: COMMERCIAL

## 2020-12-11 ENCOUNTER — LAB ENCOUNTER (OUTPATIENT)
Dept: LAB | Facility: HOSPITAL | Age: 45
End: 2020-12-11
Attending: INTERNAL MEDICINE
Payer: COMMERCIAL

## 2020-12-11 ENCOUNTER — NURSE TRIAGE (OUTPATIENT)
Dept: INTERNAL MEDICINE CLINIC | Facility: CLINIC | Age: 45
End: 2020-12-11

## 2020-12-11 DIAGNOSIS — Z20.822 EXPOSURE TO COVID-19 VIRUS: ICD-10-CM

## 2020-12-11 DIAGNOSIS — Z20.822 EXPOSURE TO COVID-19 VIRUS: Primary | ICD-10-CM

## 2020-12-11 PROCEDURE — 99213 OFFICE O/P EST LOW 20 MIN: CPT | Performed by: INTERNAL MEDICINE

## 2020-12-11 NOTE — TELEPHONE ENCOUNTER
Action Requested: Summary for Provider     []  Critical Lab, Recommendations Needed  [] Need Additional Advice  []   FYI    []   Need Orders  [] Need Medications Sent to Pharmacy  []  Other     SUMMARY: Per current process advised virtual visit and pt sche
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
## 2021-01-19 DIAGNOSIS — G89.29 CHRONIC PAIN OF RIGHT KNEE: ICD-10-CM

## 2021-01-19 DIAGNOSIS — M25.561 CHRONIC PAIN OF RIGHT KNEE: ICD-10-CM

## 2021-01-22 RX ORDER — IBUPROFEN 800 MG/1
800 TABLET ORAL EVERY 6 HOURS PRN
Qty: 45 TABLET | Refills: 0 | OUTPATIENT
Start: 2021-01-22

## 2021-02-12 ENCOUNTER — IMMUNIZATION (OUTPATIENT)
Dept: LAB | Age: 46
End: 2021-02-12
Attending: HOSPITALIST
Payer: COMMERCIAL

## 2021-02-12 DIAGNOSIS — Z23 NEED FOR VACCINATION: Primary | ICD-10-CM

## 2021-02-12 PROCEDURE — 0001A SARSCOV2 VAC 30MCG/0.3ML IM: CPT

## 2021-02-13 ENCOUNTER — APPOINTMENT (OUTPATIENT)
Dept: GENERAL RADIOLOGY | Age: 46
End: 2021-02-13
Attending: NURSE PRACTITIONER
Payer: COMMERCIAL

## 2021-02-13 ENCOUNTER — HOSPITAL ENCOUNTER (OUTPATIENT)
Age: 46
Discharge: HOME | End: 2021-02-13
Payer: COMMERCIAL

## 2021-02-13 VITALS
RESPIRATION RATE: 16 BRPM | HEART RATE: 68 BPM | SYSTOLIC BLOOD PRESSURE: 119 MMHG | TEMPERATURE: 98 F | DIASTOLIC BLOOD PRESSURE: 69 MMHG | OXYGEN SATURATION: 100 %

## 2021-02-13 DIAGNOSIS — M25.562 ACUTE PAIN OF LEFT KNEE: Primary | ICD-10-CM

## 2021-02-13 DIAGNOSIS — G89.29 CHRONIC PAIN OF RIGHT KNEE: ICD-10-CM

## 2021-02-13 DIAGNOSIS — M25.561 CHRONIC PAIN OF RIGHT KNEE: ICD-10-CM

## 2021-02-13 PROCEDURE — L1830 KO IMMOB CANVAS LONG PRE OTS: HCPCS | Performed by: NURSE PRACTITIONER

## 2021-02-13 PROCEDURE — 99213 OFFICE O/P EST LOW 20 MIN: CPT | Performed by: NURSE PRACTITIONER

## 2021-02-13 PROCEDURE — 73560 X-RAY EXAM OF KNEE 1 OR 2: CPT | Performed by: NURSE PRACTITIONER

## 2021-02-13 RX ORDER — IBUPROFEN 800 MG/1
800 TABLET ORAL EVERY 6 HOURS PRN
Qty: 45 TABLET | Refills: 0 | Status: SHIPPED | OUTPATIENT
Start: 2021-02-13 | End: 2021-04-08

## 2021-02-13 NOTE — ED PROVIDER NOTES
Patient Seen in: [facility]      History   Patient presents with:  Leg or Foot Injury    Stated Complaint: L knee pain    HPI/Subjective:   47-year-old female presents today with injury to the left knee.   States while working out start feeling signs and nursing note reviewed. Constitutional:       General: She is not in acute distress. Appearance: Normal appearance.    HENT:      Mouth/Throat:      Mouth: Mucous membranes are moist.   Neck:      Musculoskeletal: Normal range of motion and n aspect the knee with decreased flexion. X-ray was done of the knee, after review there is no fracture or acute findings. This was verified by radiology. Rice instructions were given. Patient requesting me to refill 100 mg ibuprofen tabs.   This was sent

## 2021-02-16 ENCOUNTER — TELEPHONE (OUTPATIENT)
Dept: NEUROLOGY | Facility: CLINIC | Age: 46
End: 2021-02-16

## 2021-03-01 ENCOUNTER — TELEPHONE (OUTPATIENT)
Dept: INTERNAL MEDICINE CLINIC | Facility: CLINIC | Age: 46
End: 2021-03-01

## 2021-03-01 NOTE — TELEPHONE ENCOUNTER
Patient called requesting order for mammogram.   Patient never got her mammogram done that was ordered on 2/20/19. Patient also states she is going out of town at the end of the month and needs a COVID-19 test order.    Patient unsure if Dr. Sonja Millan will b

## 2021-03-01 NOTE — TELEPHONE ENCOUNTER
Patient has seen multiple physicians. Last seen by me in 2017. She has seen Dr. Almas Aguilar recently.   Have her schedule an appointment for any testing needs or call the physician that she has seen most recently to establish care

## 2021-03-02 NOTE — TELEPHONE ENCOUNTER
Choister message viewed by patient.      From   Lesli Lala RN To   Luna Dunlap Sent and Delivered   3/1/2021  3:40 PM   Last Read in Kiera Sainz   3/1/2021  6:18 PM by Rommel Yu

## 2021-03-05 ENCOUNTER — IMMUNIZATION (OUTPATIENT)
Dept: LAB | Age: 46
End: 2021-03-05
Attending: HOSPITALIST
Payer: COMMERCIAL

## 2021-03-05 DIAGNOSIS — Z23 NEED FOR VACCINATION: Primary | ICD-10-CM

## 2021-03-05 PROCEDURE — 0002A SARSCOV2 VAC 30MCG/0.3ML IM: CPT

## 2021-03-18 ENCOUNTER — LAB ENCOUNTER (OUTPATIENT)
Dept: LAB | Age: 46
End: 2021-03-18
Attending: STUDENT IN AN ORGANIZED HEALTH CARE EDUCATION/TRAINING PROGRAM
Payer: COMMERCIAL

## 2021-03-18 ENCOUNTER — OFFICE VISIT (OUTPATIENT)
Dept: FAMILY MEDICINE CLINIC | Facility: CLINIC | Age: 46
End: 2021-03-18
Payer: COMMERCIAL

## 2021-03-18 ENCOUNTER — TELEPHONE (OUTPATIENT)
Dept: FAMILY MEDICINE CLINIC | Facility: CLINIC | Age: 46
End: 2021-03-18

## 2021-03-18 VITALS
WEIGHT: 232 LBS | DIASTOLIC BLOOD PRESSURE: 66 MMHG | TEMPERATURE: 98 F | SYSTOLIC BLOOD PRESSURE: 99 MMHG | HEIGHT: 66 IN | BODY MASS INDEX: 37.28 KG/M2 | HEART RATE: 92 BPM

## 2021-03-18 DIAGNOSIS — Z12.4 ENCOUNTER FOR PAPANICOLAOU SMEAR FOR CERVICAL CANCER SCREENING: ICD-10-CM

## 2021-03-18 DIAGNOSIS — Z01.419 ENCOUNTER FOR WELL WOMAN EXAM WITH ROUTINE GYNECOLOGICAL EXAM: ICD-10-CM

## 2021-03-18 DIAGNOSIS — R10.31 COLICKY RLQ ABDOMINAL PAIN: ICD-10-CM

## 2021-03-18 DIAGNOSIS — R10.31 COLICKY RLQ ABDOMINAL PAIN: Primary | ICD-10-CM

## 2021-03-18 DIAGNOSIS — Z12.31 SCREENING MAMMOGRAM, ENCOUNTER FOR: ICD-10-CM

## 2021-03-18 LAB
ALBUMIN SERPL-MCNC: 3.6 G/DL (ref 3.4–5)
ALBUMIN/GLOB SERPL: 0.8 {RATIO} (ref 1–2)
ALP LIVER SERPL-CCNC: 81 U/L
ALT SERPL-CCNC: 19 U/L
ANION GAP SERPL CALC-SCNC: 4 MMOL/L (ref 0–18)
APPEARANCE: CLEAR
AST SERPL-CCNC: 14 U/L (ref 15–37)
BILIRUB SERPL-MCNC: 0.3 MG/DL (ref 0.1–2)
BILIRUBIN: NEGATIVE
BUN BLD-MCNC: 14 MG/DL (ref 7–18)
BUN/CREAT SERPL: 12.7 (ref 10–20)
CALCIUM BLD-MCNC: 9.3 MG/DL (ref 8.5–10.1)
CHLORIDE SERPL-SCNC: 106 MMOL/L (ref 98–112)
CHOLEST SMN-MCNC: 202 MG/DL (ref ?–200)
CO2 SERPL-SCNC: 30 MMOL/L (ref 21–32)
CREAT BLD-MCNC: 1.1 MG/DL
CRP SERPL-MCNC: 1.28 MG/DL (ref ?–0.3)
DEPRECATED RDW RBC AUTO: 50.9 FL (ref 35.1–46.3)
ERYTHROCYTE [DISTWIDTH] IN BLOOD BY AUTOMATED COUNT: 16.9 % (ref 11–15)
EST. AVERAGE GLUCOSE BLD GHB EST-MCNC: 123 MG/DL (ref 68–126)
GLOBULIN PLAS-MCNC: 4.5 G/DL (ref 2.8–4.4)
GLUCOSE (URINE DIPSTICK): NEGATIVE MG/DL
GLUCOSE BLD-MCNC: 85 MG/DL (ref 70–99)
HBA1C MFR BLD HPLC: 5.9 % (ref ?–5.7)
HCT VFR BLD AUTO: 32.4 %
HDLC SERPL-MCNC: 86 MG/DL (ref 40–59)
HGB BLD-MCNC: 9.8 G/DL
KETONES (URINE DIPSTICK): NEGATIVE MG/DL
LDLC SERPL CALC-MCNC: 105 MG/DL (ref ?–100)
LEUKOCYTES: NEGATIVE
M PROTEIN MFR SERPL ELPH: 8.1 G/DL (ref 6.4–8.2)
MCH RBC QN AUTO: 25 PG (ref 26–34)
MCHC RBC AUTO-ENTMCNC: 30.2 G/DL (ref 31–37)
MCV RBC AUTO: 82.7 FL
MULTISTIX LOT#: 5077 NUMERIC
NITRITE, URINE: NEGATIVE
NONHDLC SERPL-MCNC: 116 MG/DL (ref ?–130)
OCCULT BLOOD: NEGATIVE
OSMOLALITY SERPL CALC.SUM OF ELEC: 290 MOSM/KG (ref 275–295)
PATIENT FASTING Y/N/NP: YES
PATIENT FASTING Y/N/NP: YES
PH, URINE: 6.5 (ref 4.5–8)
PLATELET # BLD AUTO: 306 10(3)UL (ref 150–450)
POTASSIUM SERPL-SCNC: 3.9 MMOL/L (ref 3.5–5.1)
RBC # BLD AUTO: 3.92 X10(6)UL
SODIUM SERPL-SCNC: 140 MMOL/L (ref 136–145)
SPECIFIC GRAVITY: 1.01 (ref 1–1.03)
TRIGL SERPL-MCNC: 56 MG/DL (ref 30–149)
TSI SER-ACNC: 1.37 MIU/ML (ref 0.36–3.74)
URINE-COLOR: YELLOW
UROBILINOGEN,SEMI-QN: 0.2 MG/DL (ref 0–1.9)
VLDLC SERPL CALC-MCNC: 11 MG/DL (ref 0–30)
WBC # BLD AUTO: 8.1 X10(3) UL (ref 4–11)

## 2021-03-18 PROCEDURE — 99213 OFFICE O/P EST LOW 20 MIN: CPT | Performed by: STUDENT IN AN ORGANIZED HEALTH CARE EDUCATION/TRAINING PROGRAM

## 2021-03-18 PROCEDURE — 3074F SYST BP LT 130 MM HG: CPT | Performed by: STUDENT IN AN ORGANIZED HEALTH CARE EDUCATION/TRAINING PROGRAM

## 2021-03-18 PROCEDURE — 3078F DIAST BP <80 MM HG: CPT | Performed by: STUDENT IN AN ORGANIZED HEALTH CARE EDUCATION/TRAINING PROGRAM

## 2021-03-18 PROCEDURE — 85027 COMPLETE CBC AUTOMATED: CPT

## 2021-03-18 PROCEDURE — 83036 HEMOGLOBIN GLYCOSYLATED A1C: CPT

## 2021-03-18 PROCEDURE — 36415 COLL VENOUS BLD VENIPUNCTURE: CPT

## 2021-03-18 PROCEDURE — 84443 ASSAY THYROID STIM HORMONE: CPT

## 2021-03-18 PROCEDURE — 80053 COMPREHEN METABOLIC PANEL: CPT

## 2021-03-18 PROCEDURE — 80061 LIPID PANEL: CPT

## 2021-03-18 PROCEDURE — 82306 VITAMIN D 25 HYDROXY: CPT

## 2021-03-18 PROCEDURE — 86140 C-REACTIVE PROTEIN: CPT

## 2021-03-18 PROCEDURE — 99396 PREV VISIT EST AGE 40-64: CPT | Performed by: STUDENT IN AN ORGANIZED HEALTH CARE EDUCATION/TRAINING PROGRAM

## 2021-03-18 PROCEDURE — 3008F BODY MASS INDEX DOCD: CPT | Performed by: STUDENT IN AN ORGANIZED HEALTH CARE EDUCATION/TRAINING PROGRAM

## 2021-03-18 PROCEDURE — 81002 URINALYSIS NONAUTO W/O SCOPE: CPT | Performed by: STUDENT IN AN ORGANIZED HEALTH CARE EDUCATION/TRAINING PROGRAM

## 2021-03-18 NOTE — PROGRESS NOTES
HPI:    Patient ID: Lord Baer is a 39year old female. HPI  Pt presenting for routine well woman exam. Denies any recent illnesses. No significant chronic medical problems.  Past medical/surgical history, family history, and social history wer reviewed. Constitutional:       General: She is not in acute distress. Appearance: Normal appearance. She is well-developed. HENT:      Head: Normocephalic and atraumatic.       Right Ear: Ear canal and external ear normal. A middle ear effusion is time. Mental status is at baseline. Psychiatric:         Attention and Perception: Attention normal.         Mood and Affect: Mood and affect normal.         Behavior: Behavior normal. Behavior is cooperative. Thought Content:  Thought content almazan CBC, Platelet, No Differential [E]      Vitamin D, 25-Hydroxy [E]      C-Reactive Protein [E]      ThinPrep PAP Smear      Meds This Visit:  Requested Prescriptions      No prescriptions requested or ordered in this encounter       Imaging & Referrals:

## 2021-03-18 NOTE — TELEPHONE ENCOUNTER
The patient calling stating [facility]. I sent the order in [employer]. Instructed if they do not accept please supply us with a  Fax number. Also stated to check with her insurance company to make sure is covered.     Johan

## 2021-03-19 LAB
25(OH)D3 SERPL-MCNC: 13.3 NG/ML (ref 30–100)
HPV I/H RISK 1 DNA SPEC QL NAA+PROBE: NEGATIVE

## 2021-04-08 DIAGNOSIS — M25.561 CHRONIC PAIN OF RIGHT KNEE: ICD-10-CM

## 2021-04-08 DIAGNOSIS — G89.29 CHRONIC PAIN OF RIGHT KNEE: ICD-10-CM

## 2021-04-08 RX ORDER — IBUPROFEN 800 MG/1
TABLET ORAL
Qty: 45 TABLET | Refills: 0 | Status: SHIPPED | OUTPATIENT
Start: 2021-04-08 | End: 2021-06-07

## 2021-04-13 RX ORDER — FLUTICASONE PROPIONATE 50 MCG
SPRAY, SUSPENSION (ML) NASAL
Refills: 2 | OUTPATIENT
Start: 2021-04-13

## 2021-06-07 DIAGNOSIS — G89.29 CHRONIC PAIN OF RIGHT KNEE: ICD-10-CM

## 2021-06-07 DIAGNOSIS — M25.561 CHRONIC PAIN OF RIGHT KNEE: ICD-10-CM

## 2021-06-07 RX ORDER — IBUPROFEN 800 MG/1
800 TABLET ORAL EVERY 6 HOURS PRN
Qty: 45 TABLET | Refills: 0 | Status: SHIPPED | OUTPATIENT
Start: 2021-06-07 | End: 2021-07-27

## 2021-07-26 DIAGNOSIS — G89.29 CHRONIC PAIN OF RIGHT KNEE: ICD-10-CM

## 2021-07-26 DIAGNOSIS — M25.561 CHRONIC PAIN OF RIGHT KNEE: ICD-10-CM

## 2021-07-27 RX ORDER — IBUPROFEN 800 MG/1
TABLET ORAL
Qty: 45 TABLET | Refills: 0 | Status: SHIPPED | OUTPATIENT
Start: 2021-07-27 | End: 2021-08-21

## 2021-07-27 RX ORDER — IBUPROFEN 800 MG/1
800 TABLET ORAL EVERY 6 HOURS PRN
Qty: 45 TABLET | Refills: 0 | OUTPATIENT
Start: 2021-07-27

## 2021-08-21 DIAGNOSIS — M25.561 CHRONIC PAIN OF RIGHT KNEE: ICD-10-CM

## 2021-08-21 DIAGNOSIS — G89.29 CHRONIC PAIN OF RIGHT KNEE: ICD-10-CM

## 2021-08-21 DIAGNOSIS — E55.9 VITAMIN D DEFICIENCY: ICD-10-CM

## 2021-08-21 RX ORDER — ERGOCALCIFEROL 1.25 MG/1
CAPSULE ORAL
Qty: 12 CAPSULE | Refills: 1 | Status: SHIPPED | OUTPATIENT
Start: 2021-08-21

## 2021-08-21 RX ORDER — IBUPROFEN 800 MG/1
TABLET ORAL
Qty: 45 TABLET | Refills: 0 | Status: SHIPPED | OUTPATIENT
Start: 2021-08-21 | End: 2021-10-27

## 2021-10-27 ENCOUNTER — TELEPHONE (OUTPATIENT)
Dept: FAMILY MEDICINE CLINIC | Facility: CLINIC | Age: 46
End: 2021-10-27

## 2021-10-27 DIAGNOSIS — M25.561 CHRONIC PAIN OF RIGHT KNEE: ICD-10-CM

## 2021-10-27 DIAGNOSIS — G89.29 CHRONIC PAIN OF RIGHT KNEE: ICD-10-CM

## 2021-10-27 NOTE — TELEPHONE ENCOUNTER
Patient requesting physical form to be completed so that she may pick it up from Dr. Lay Drake office to provide to her place of employment. Per her employment it must state the following. Fit to work  Good health  No physical limitations to preform job duties. No communicable diseases     Patient requesting phone call when it is completed.

## 2021-10-28 RX ORDER — IBUPROFEN 800 MG/1
800 TABLET ORAL EVERY 6 HOURS PRN
Qty: 45 TABLET | Refills: 0 | Status: SHIPPED | OUTPATIENT
Start: 2021-10-28

## 2021-10-28 NOTE — TELEPHONE ENCOUNTER
Called pt to clarify if she dropped off a form for dr to complete. Pt is requesting a letter with information listed below. Last Px with dr Mari Dominguez 3/18/2021. Labs in process for Quantiferon TB and Hep B antibody; she is an RN. Pt will view letter thru myChart.

## 2022-02-08 NOTE — TELEPHONE ENCOUNTER
Please review refill protocol failed/ no protocol  Requested Prescriptions   Pending Prescriptions Disp Refills    ibuprofen 800 MG Oral Tab 45 tablet 0     Sig: Take 1 tablet (800 mg total) by mouth every 6 (six) hours as needed for Pain.         There is no refill protocol information for this order

## 2022-02-08 NOTE — TELEPHONE ENCOUNTER
Please review. Protocol Failed or has No Protocol.     Requested Prescriptions   Pending Prescriptions Disp Refills    ERGOCALCIFEROL 1.25 MG (85268 UT) Oral Cap [Pharmacy Med Name: VITAMIN D2 1.25MG(50,000 UNIT)] 12 capsule 1     Sig: TAKE 1 CAPSULE BY MOUTH ONE TIME PER WEEK        There is no refill protocol information for this order               Recent Outpatient Visits              3 months ago Wellness examination    [facility]    Nurse Only    10 months ago Colicky RLQ abdominal pain    Erika Liriano MD    Office Visit    1 year ago Exposure to COVID-19 virus    [place] [facility] [employer] [facility], [address] [facility] Alexandrea Wilkes MD    Telemedicine    1 year ago Abnormal vaginal bleeding    [place] [facility] [facility], [address] [facility]    Office Visit    1 year ago Chondromalacia, patella, [place], [place]    Office Visit

## 2022-02-09 RX ORDER — IBUPROFEN 800 MG/1
800 TABLET ORAL EVERY 6 HOURS PRN
Qty: 45 TABLET | Refills: 0 | Status: SHIPPED | OUTPATIENT
Start: 2022-02-09

## 2022-02-09 RX ORDER — ERGOCALCIFEROL 1.25 MG/1
50000 CAPSULE ORAL WEEKLY
Qty: 12 CAPSULE | Refills: 1 | Status: SHIPPED | OUTPATIENT
Start: 2022-02-09

## 2022-03-08 ENCOUNTER — TELEPHONE (OUTPATIENT)
Dept: INTERNAL MEDICINE CLINIC | Facility: CLINIC | Age: 47
End: 2022-03-08

## 2022-03-08 NOTE — TELEPHONE ENCOUNTER
Spoke with patient ( verified)--sent 10/27/2021 lab results and immunization record on letterhead and sent to her MyChart per her request. No further questions/concerns at this time.

## 2022-06-01 DIAGNOSIS — M25.561 CHRONIC PAIN OF RIGHT KNEE: ICD-10-CM

## 2022-06-01 DIAGNOSIS — G89.29 CHRONIC PAIN OF RIGHT KNEE: ICD-10-CM

## 2022-06-01 NOTE — TELEPHONE ENCOUNTER
Please review. Protocol failed / No protocol. Requested Prescriptions   Pending Prescriptions Disp Refills    ibuprofen 800 MG Oral Tab 45 tablet 0     Sig: Take 1 tablet (800 mg total) by mouth every 6 (six) hours as needed for Pain.         There is no refill protocol information for this order             Recent Outpatient Visits              7 months ago Wellness examination    [facility]    Nurse Only    1 year ago Colicky RLQ abdominal pain    Malina Gongora MD    Office Visit    1 year ago Exposure to COVID-19 virus    [facility], [address] [facility] [place] Vin Wilkes MD    Telemedicine    1 year ago Abnormal vaginal bleeding    [facility], [address] [place], [place]    Office Visit    1 year ago Chondromalacia, patella, right    [facility] [address] [place], [place]    Office Visit

## 2022-06-02 RX ORDER — IBUPROFEN 800 MG/1
800 TABLET ORAL EVERY 6 HOURS PRN
Qty: 45 TABLET | Refills: 0 | Status: SHIPPED | OUTPATIENT
Start: 2022-06-02

## 2022-08-04 DIAGNOSIS — E55.9 VITAMIN D DEFICIENCY: ICD-10-CM

## 2022-08-04 DIAGNOSIS — G89.29 CHRONIC PAIN OF RIGHT KNEE: ICD-10-CM

## 2022-08-04 DIAGNOSIS — M25.561 CHRONIC PAIN OF RIGHT KNEE: ICD-10-CM

## 2022-08-04 RX ORDER — IBUPROFEN 800 MG/1
800 TABLET ORAL EVERY 6 HOURS PRN
Qty: 45 TABLET | Refills: 0 | Status: SHIPPED | OUTPATIENT
Start: 2022-08-04

## 2022-08-04 RX ORDER — ERGOCALCIFEROL 1.25 MG/1
50000 CAPSULE ORAL WEEKLY
Qty: 12 CAPSULE | Refills: 1 | Status: SHIPPED | OUTPATIENT
Start: 2022-08-04

## 2022-09-18 ENCOUNTER — HOSPITAL ENCOUNTER (OUTPATIENT)
Age: 47
Discharge: HOME | End: 2022-09-18
Payer: COMMERCIAL

## 2022-09-18 VITALS
SYSTOLIC BLOOD PRESSURE: 127 MMHG | DIASTOLIC BLOOD PRESSURE: 54 MMHG | HEART RATE: 79 BPM | TEMPERATURE: 99 F | OXYGEN SATURATION: 97 % | RESPIRATION RATE: 18 BRPM

## 2022-09-18 DIAGNOSIS — J01.90 ACUTE VIRAL SINUSITIS: Primary | ICD-10-CM

## 2022-09-18 DIAGNOSIS — B97.89 ACUTE VIRAL SINUSITIS: Primary | ICD-10-CM

## 2022-09-18 LAB — SARS-COV-2 RNA RESP QL NAA+PROBE: NOT DETECTED

## 2022-09-18 RX ORDER — PREDNISONE 20 MG/1
20 TABLET ORAL 2 TIMES DAILY
Qty: 10 TABLET | Refills: 0 | Status: SHIPPED | OUTPATIENT
Start: 2022-09-18 | End: 2022-09-23

## 2022-09-18 RX ORDER — AMOXICILLIN AND CLAVULANATE POTASSIUM 875; 125 MG/1; MG/1
1 TABLET, FILM COATED ORAL 2 TIMES DAILY
Qty: 20 TABLET | Refills: 0 | Status: SHIPPED | OUTPATIENT
Start: 2022-09-18 | End: 2022-09-28

## 2022-10-15 ENCOUNTER — APPOINTMENT (OUTPATIENT)
Dept: ULTRASOUND IMAGING | Age: 47
End: 2022-10-15
Attending: EMERGENCY MEDICINE
Payer: COMMERCIAL

## 2022-10-15 ENCOUNTER — HOSPITAL ENCOUNTER (INPATIENT)
Facility: HOSPITAL | Age: 47
LOS: 4 days | Discharge: HOME | End: 2022-10-19
Attending: EMERGENCY MEDICINE | Admitting: HOSPITALIST
Payer: COMMERCIAL

## 2022-10-15 ENCOUNTER — APPOINTMENT (OUTPATIENT)
Dept: CT IMAGING | Age: 47
End: 2022-10-15
Attending: EMERGENCY MEDICINE
Payer: COMMERCIAL

## 2022-10-15 ENCOUNTER — APPOINTMENT (OUTPATIENT)
Dept: GENERAL RADIOLOGY | Age: 47
End: 2022-10-15
Attending: EMERGENCY MEDICINE
Payer: COMMERCIAL

## 2022-10-15 ENCOUNTER — APPOINTMENT (OUTPATIENT)
Dept: ULTRASOUND IMAGING | Age: 47
End: 2022-10-15
Attending: PHYSICIAN ASSISTANT
Payer: COMMERCIAL

## 2022-10-15 DIAGNOSIS — R94.31 ABNORMAL EKG: ICD-10-CM

## 2022-10-15 DIAGNOSIS — D25.9 UTERINE LEIOMYOMA, UNSPECIFIED LOCATION: ICD-10-CM

## 2022-10-15 DIAGNOSIS — R07.9 EXERTIONAL CHEST PAIN: ICD-10-CM

## 2022-10-15 DIAGNOSIS — D64.9 SYMPTOMATIC ANEMIA: Primary | ICD-10-CM

## 2022-10-15 DIAGNOSIS — I26.94 MULTIPLE SUBSEGMENTAL PULMONARY EMBOLI WITHOUT ACUTE COR PULMONALE (HCC): ICD-10-CM

## 2022-10-15 DIAGNOSIS — N63.20 MASS OF LEFT BREAST, UNSPECIFIED QUADRANT: ICD-10-CM

## 2022-10-15 DIAGNOSIS — M22.41 CHONDROMALACIA, PATELLA, RIGHT: ICD-10-CM

## 2022-10-15 DIAGNOSIS — D50.0 IRON DEFICIENCY ANEMIA SECONDARY TO BLOOD LOSS (CHRONIC): ICD-10-CM

## 2022-10-15 DIAGNOSIS — N92.0 MENORRHAGIA WITH REGULAR CYCLE: ICD-10-CM

## 2022-10-15 PROBLEM — E87.6 HYPOKALEMIA: Status: ACTIVE | Noted: 2022-10-15

## 2022-10-15 LAB
ALBUMIN SERPL-MCNC: 3.2 G/DL (ref 3.4–5)
ALBUMIN/GLOB SERPL: 0.7 {RATIO} (ref 1–2)
ALP LIVER SERPL-CCNC: 86 U/L
ALT SERPL-CCNC: 33 U/L
ANION GAP SERPL CALC-SCNC: 4 MMOL/L (ref 0–18)
ANION GAP SERPL CALC-SCNC: 5 MMOL/L (ref 0–18)
ANTIBODY SCREEN: NEGATIVE
APTT PPP: 24.3 SECONDS (ref 23.3–35.6)
AST SERPL-CCNC: 22 U/L (ref 15–37)
BASOPHILS # BLD AUTO: 0.03 X10(3) UL (ref 0–0.2)
BASOPHILS NFR BLD AUTO: 0.3 %
BILIRUB SERPL-MCNC: 0.2 MG/DL (ref 0.1–2)
BUN BLD-MCNC: 11 MG/DL (ref 7–18)
BUN BLD-MCNC: 12 MG/DL (ref 7–18)
CALCIUM BLD-MCNC: 8.4 MG/DL (ref 8.5–10.1)
CALCIUM BLD-MCNC: 9.3 MG/DL (ref 8.5–10.1)
CHLORIDE SERPL-SCNC: 108 MMOL/L (ref 98–112)
CHLORIDE SERPL-SCNC: 109 MMOL/L (ref 98–112)
CO2 SERPL-SCNC: 25 MMOL/L (ref 21–32)
CO2 SERPL-SCNC: 26 MMOL/L (ref 21–32)
CREAT BLD-MCNC: 1.06 MG/DL
CREAT BLD-MCNC: 1.07 MG/DL
D DIMER PPP FEU-MCNC: 12.09 UG/ML FEU (ref ?–0.5)
DEPRECATED HBV CORE AB SER IA-ACNC: 7.9 NG/ML
EOSINOPHIL # BLD AUTO: 0.37 X10(3) UL (ref 0–0.7)
EOSINOPHIL NFR BLD AUTO: 4.1 %
ERYTHROCYTE [DISTWIDTH] IN BLOOD BY AUTOMATED COUNT: 19.5 %
ERYTHROCYTE [DISTWIDTH] IN BLOOD BY AUTOMATED COUNT: 19.8 %
GFR SERPLBLD BASED ON 1.73 SQ M-ARVRAT: 65 ML/MIN/1.73M2 (ref 60–?)
GFR SERPLBLD BASED ON 1.73 SQ M-ARVRAT: 66 ML/MIN/1.73M2 (ref 60–?)
GLOBULIN PLAS-MCNC: 4.6 G/DL (ref 2.8–4.4)
GLUCOSE BLD-MCNC: 96 MG/DL (ref 70–99)
GLUCOSE BLD-MCNC: 99 MG/DL (ref 70–99)
HCT VFR BLD AUTO: 18.8 %
HCT VFR BLD AUTO: 21.2 %
HGB BLD-MCNC: 5.3 G/DL
HGB BLD-MCNC: 6 G/DL
IMM GRANULOCYTES # BLD AUTO: 0.03 X10(3) UL (ref 0–1)
IMM GRANULOCYTES NFR BLD: 0.3 %
IRON SATN MFR SERPL: 2 %
IRON SERPL-MCNC: 10 UG/DL
LYMPHOCYTES # BLD AUTO: 2.14 X10(3) UL (ref 1–4)
LYMPHOCYTES NFR BLD AUTO: 23.9 %
MCH RBC QN AUTO: 19.2 PG (ref 26–34)
MCH RBC QN AUTO: 19.3 PG (ref 26–34)
MCHC RBC AUTO-ENTMCNC: 28.2 G/DL (ref 31–37)
MCHC RBC AUTO-ENTMCNC: 28.3 G/DL (ref 31–37)
MCV RBC AUTO: 67.9 FL
MCV RBC AUTO: 68.4 FL
MONOCYTES # BLD AUTO: 0.45 X10(3) UL (ref 0.1–1)
MONOCYTES NFR BLD AUTO: 5 %
NEUTROPHILS # BLD AUTO: 5.92 X10 (3) UL (ref 1.5–7.7)
NEUTROPHILS # BLD AUTO: 5.92 X10(3) UL (ref 1.5–7.7)
NEUTROPHILS NFR BLD AUTO: 66.4 %
OSMOLALITY SERPL CALC.SUM OF ELEC: 285 MOSM/KG (ref 275–295)
OSMOLALITY SERPL CALC.SUM OF ELEC: 288 MOSM/KG (ref 275–295)
PLATELET # BLD AUTO: 288 10(3)UL (ref 150–450)
PLATELET # BLD AUTO: 325 10(3)UL (ref 150–450)
POTASSIUM SERPL-SCNC: 3.5 MMOL/L (ref 3.5–5.1)
POTASSIUM SERPL-SCNC: 4 MMOL/L (ref 3.5–5.1)
PROT SERPL-MCNC: 7.8 G/DL (ref 6.4–8.2)
RBC # BLD AUTO: 2.75 X10(6)UL
RBC # BLD AUTO: 3.12 X10(6)UL
RH BLOOD TYPE: POSITIVE
SARS-COV-2 RNA RESP QL NAA+PROBE: NOT DETECTED
SODIUM SERPL-SCNC: 138 MMOL/L (ref 136–145)
SODIUM SERPL-SCNC: 139 MMOL/L (ref 136–145)
TIBC SERPL-MCNC: 569 UG/DL (ref 240–450)
TRANSFERRIN SERPL-MCNC: 382 MG/DL (ref 200–360)
WBC # BLD AUTO: 10 X10(3) UL (ref 4–11)
WBC # BLD AUTO: 8.9 X10(3) UL (ref 4–11)

## 2022-10-15 PROCEDURE — 71045 X-RAY EXAM CHEST 1 VIEW: CPT | Performed by: EMERGENCY MEDICINE

## 2022-10-15 PROCEDURE — 30233N1 TRANSFUSION OF NONAUTOLOGOUS RED BLOOD CELLS INTO PERIPHERAL VEIN, PERCUTANEOUS APPROACH: ICD-10-PCS | Performed by: INTERNAL MEDICINE

## 2022-10-15 PROCEDURE — 71275 CT ANGIOGRAPHY CHEST: CPT | Performed by: EMERGENCY MEDICINE

## 2022-10-15 PROCEDURE — 99223 1ST HOSP IP/OBS HIGH 75: CPT | Performed by: INTERNAL MEDICINE

## 2022-10-15 PROCEDURE — 93971 EXTREMITY STUDY: CPT | Performed by: EMERGENCY MEDICINE

## 2022-10-15 RX ORDER — ACETAMINOPHEN 500 MG
500 TABLET ORAL EVERY 4 HOURS PRN
Status: DISCONTINUED | OUTPATIENT
Start: 2022-10-15 | End: 2022-10-16

## 2022-10-15 RX ORDER — SODIUM CHLORIDE 9 MG/ML
INJECTION, SOLUTION INTRAVENOUS ONCE
Status: COMPLETED | OUTPATIENT
Start: 2022-10-15 | End: 2022-10-15

## 2022-10-15 RX ORDER — ONDANSETRON 2 MG/ML
8 INJECTION INTRAMUSCULAR; INTRAVENOUS EVERY 6 HOURS PRN
Status: DISCONTINUED | OUTPATIENT
Start: 2022-10-15 | End: 2022-10-19

## 2022-10-15 RX ORDER — HEPARIN SODIUM AND DEXTROSE 10000; 5 [USP'U]/100ML; G/100ML
18 INJECTION INTRAVENOUS ONCE
Status: COMPLETED | OUTPATIENT
Start: 2022-10-15 | End: 2022-10-15

## 2022-10-15 RX ORDER — HEPARIN SODIUM 1000 [USP'U]/ML
80 INJECTION, SOLUTION INTRAVENOUS; SUBCUTANEOUS ONCE
Status: COMPLETED | OUTPATIENT
Start: 2022-10-15 | End: 2022-10-15

## 2022-10-15 RX ORDER — POLYETHYLENE GLYCOL 3350 17 G/17G
17 POWDER, FOR SOLUTION ORAL DAILY PRN
Status: DISCONTINUED | OUTPATIENT
Start: 2022-10-15 | End: 2022-10-19

## 2022-10-15 RX ORDER — HEPARIN SODIUM AND DEXTROSE 10000; 5 [USP'U]/100ML; G/100ML
INJECTION INTRAVENOUS CONTINUOUS
Status: DISCONTINUED | OUTPATIENT
Start: 2022-10-15 | End: 2022-10-19

## 2022-10-15 RX ORDER — BISACODYL 10 MG
10 SUPPOSITORY, RECTAL RECTAL
Status: DISCONTINUED | OUTPATIENT
Start: 2022-10-15 | End: 2022-10-19

## 2022-10-15 RX ORDER — SENNOSIDES 8.6 MG
17.2 TABLET ORAL NIGHTLY PRN
Status: DISCONTINUED | OUTPATIENT
Start: 2022-10-15 | End: 2022-10-19

## 2022-10-15 RX ORDER — MELATONIN
3 NIGHTLY PRN
Status: DISCONTINUED | OUTPATIENT
Start: 2022-10-15 | End: 2022-10-19

## 2022-10-15 RX ORDER — HYDROCODONE BITARTRATE AND ACETAMINOPHEN 5; 325 MG/1; MG/1
1 TABLET ORAL EVERY 6 HOURS PRN
Status: DISCONTINUED | OUTPATIENT
Start: 2022-10-15 | End: 2022-10-19

## 2022-10-15 RX ORDER — ECHINACEA PURPUREA EXTRACT 125 MG
1 TABLET ORAL
Status: DISCONTINUED | OUTPATIENT
Start: 2022-10-15 | End: 2022-10-19

## 2022-10-15 RX ORDER — BENZONATATE 200 MG/1
200 CAPSULE ORAL 3 TIMES DAILY PRN
Status: DISCONTINUED | OUTPATIENT
Start: 2022-10-15 | End: 2022-10-19

## 2022-10-15 NOTE — ED QUICK NOTES
Report given to [facility], rn.  eas at bedside and assumed care of pt at this time

## 2022-10-15 NOTE — ED INITIAL ASSESSMENT (HPI)
States rimma with exertion for one week. Right knee swelling is worse and more painful and is having pain in ankle as well.  States when trying to take a deep breath causes broncho spasm

## 2022-10-15 NOTE — ED QUICK NOTES
Orders for admission, patient is aware of plan and ready to go upstairs. Any questions, please call ED RN avery at extension [phone].      Patient Covid vaccination status: Fully vaccinated     COVID Test Ordered in ED: Rapid SARS-CoV-2 by PCR    COVID Suspicion at Admission: N/A    Running Infusions:  None    Mental Status/LOC at time of transport: a and 0x3    Other pertinent information:   CIWA score: N/A   NIH score:  N/A

## 2022-10-16 ENCOUNTER — APPOINTMENT (OUTPATIENT)
Dept: ULTRASOUND IMAGING | Facility: HOSPITAL | Age: 47
End: 2022-10-16
Attending: OBSTETRICS & GYNECOLOGY
Payer: COMMERCIAL

## 2022-10-16 ENCOUNTER — APPOINTMENT (OUTPATIENT)
Dept: CV DIAGNOSTICS | Facility: HOSPITAL | Age: 47
End: 2022-10-16
Attending: HOSPITALIST
Payer: COMMERCIAL

## 2022-10-16 LAB
ANION GAP SERPL CALC-SCNC: 5 MMOL/L (ref 0–18)
APTT PPP: 102.8 SECONDS (ref 23.3–35.6)
APTT PPP: 105.7 SECONDS (ref 23.3–35.6)
BUN BLD-MCNC: 13 MG/DL (ref 7–18)
CALCIUM BLD-MCNC: 8.3 MG/DL (ref 8.5–10.1)
CHLORIDE SERPL-SCNC: 112 MMOL/L (ref 98–112)
CO2 SERPL-SCNC: 23 MMOL/L (ref 21–32)
CREAT BLD-MCNC: 0.95 MG/DL
ERYTHROCYTE [DISTWIDTH] IN BLOOD BY AUTOMATED COUNT: 21.3 %
GFR SERPLBLD BASED ON 1.73 SQ M-ARVRAT: 75 ML/MIN/1.73M2 (ref 60–?)
GLUCOSE BLD-MCNC: 103 MG/DL (ref 70–99)
HCT VFR BLD AUTO: 22.4 %
HGB BLD-MCNC: 6.3 G/DL
MCH RBC QN AUTO: 20.1 PG (ref 26–34)
MCHC RBC AUTO-ENTMCNC: 28.1 G/DL (ref 31–37)
MCV RBC AUTO: 71.6 FL
OSMOLALITY SERPL CALC.SUM OF ELEC: 290 MOSM/KG (ref 275–295)
PLATELET # BLD AUTO: 283 10(3)UL (ref 150–450)
PLATELET # BLD AUTO: 283 10(3)UL (ref 150–450)
POTASSIUM SERPL-SCNC: 4.1 MMOL/L (ref 3.5–5.1)
RBC # BLD AUTO: 3.13 X10(6)UL
SODIUM SERPL-SCNC: 140 MMOL/L (ref 136–145)
VIT D+METAB SERPL-MCNC: 25.5 NG/ML (ref 30–100)
WBC # BLD AUTO: 9.1 X10(3) UL (ref 4–11)

## 2022-10-16 PROCEDURE — 99232 SBSQ HOSP IP/OBS MODERATE 35: CPT | Performed by: HOSPITALIST

## 2022-10-16 PROCEDURE — 93306 TTE W/DOPPLER COMPLETE: CPT | Performed by: HOSPITALIST

## 2022-10-16 PROCEDURE — 76830 TRANSVAGINAL US NON-OB: CPT | Performed by: OBSTETRICS & GYNECOLOGY

## 2022-10-16 PROCEDURE — 99255 IP/OBS CONSLTJ NEW/EST HI 80: CPT | Performed by: SPECIALIST

## 2022-10-16 PROCEDURE — 76856 US EXAM PELVIC COMPLETE: CPT | Performed by: OBSTETRICS & GYNECOLOGY

## 2022-10-16 RX ORDER — SODIUM CHLORIDE 9 MG/ML
INJECTION, SOLUTION INTRAVENOUS CONTINUOUS
Status: DISCONTINUED | OUTPATIENT
Start: 2022-10-16 | End: 2022-10-19

## 2022-10-16 RX ORDER — ACETAMINOPHEN 325 MG/1
650 TABLET ORAL ONCE
Status: COMPLETED | OUTPATIENT
Start: 2022-10-16 | End: 2022-10-16

## 2022-10-16 RX ORDER — LORAZEPAM 2 MG/ML
0.5 INJECTION INTRAMUSCULAR ONCE
Status: DISCONTINUED | OUTPATIENT
Start: 2022-10-16 | End: 2022-10-17

## 2022-10-16 RX ORDER — ACETAMINOPHEN 500 MG
1000 TABLET ORAL EVERY 4 HOURS PRN
Status: DISCONTINUED | OUTPATIENT
Start: 2022-10-16 | End: 2022-10-19

## 2022-10-16 RX ORDER — SODIUM CHLORIDE 9 MG/ML
INJECTION, SOLUTION INTRAVENOUS ONCE
Status: COMPLETED | OUTPATIENT
Start: 2022-10-16 | End: 2022-10-16

## 2022-10-16 NOTE — PLAN OF CARE
A&Ox4. Reports pain. Norco given once and tylenol given. Pt requested a stronger dose of tylenol does not want to take narcotics. Hep drip going at 16mL will adjust after ptt comes back. 1 unit of blood given. Hematology and gynecology consulted this morning. US doppler to be done today.     Problem: Patient/Family Goals  Goal: Patient/Family Long Term Goal  Description: Patient's Long Term Goal:     Interventions:  -   - See additional Care Plan goals for specific interventions  Outcome: Progressing  Goal: Patient/Family Short Term Goal  Description: Patient's Short Term Goal:     Interventions:   -   - See additional Care Plan goals for specific interventions  Outcome: Progressing     Problem: PAIN - ADULT  Goal: Verbalizes/displays adequate comfort level or patient's stated pain goal  Description: INTERVENTIONS:  - Encourage pt to monitor pain and request assistance  - Assess pain using appropriate pain scale  - Administer analgesics based on type and severity of pain and evaluate response  - Implement non-pharmacological measures as appropriate and evaluate response  - Consider cultural and social influences on pain and pain management  - Manage/alleviate anxiety  - Utilize distraction and/or relaxation techniques  - Monitor for opioid side effects  - Notify MD/LIP if interventions unsuccessful or patient reports new pain  - Anticipate increased pain with activity and pre-medicate as appropriate  Outcome: Progressing     Problem: RESPIRATORY - ADULT  Goal: Achieves optimal ventilation and oxygenation  Description: INTERVENTIONS:  - Assess for changes in respiratory status  - Assess for changes in mentation and behavior  - Position to facilitate oxygenation and minimize respiratory effort  - Oxygen supplementation based on oxygen saturation or ABGs  - Provide Smoking Cessation handout, if applicable  - Encourage broncho-pulmonary hygiene including cough, deep breathe, Incentive Spirometry  - Assess the need for suctioning and perform as needed  - Assess and instruct to report SOB or any respiratory difficulty  - Respiratory Therapy support as indicated  - Manage/alleviate anxiety  - Monitor for signs/symptoms of CO2 retention  Outcome: Progressing

## 2022-10-16 NOTE — CM/SW NOTE
CM called patient's pharmacy ([facility]) to check cost of Eliquis prescribed for discharge; cost is $75 per month before coupon card applied. CM provided $10 co pay coupon card to RN for patient use (will need to register card prior to use).       Estefania Dumont RN Case Manager [id number]

## 2022-10-16 NOTE — PLAN OF CARE
Received patient at 0730 . Alert and oriented x4 . Tele rhythm is normal sinus with a rate maintaining 70-90 bpm . O2 saturation is 97% on 2 liters of O2 . Breath sounds regular, clear in upper lobes, and diminished in lower lobes bilaterally. Patient has a dry cough present. Patient is ambulating with stand by assist throughout the room . Patient is voiding. Last BM is 10/15. Patient is currently receiving one unit of blood and is tolerating it well at this time. Patient has no complaints of pain. Family is present in the room. Call light is within reach of the patient. All patient needs are currently met.       Problem: Patient/Family Goals  Goal: Patient/Family Long Term Goal  Description: Patient's Long Term Goal: Go Home  Interventions:  - Receive RBCs  -Adequate nutrition and hydration  -Walk  - See additional Care Plan goals for specific interventions  Outcome: Progressing  Goal: Patient/Family Short Term Goal  Description: Patient's Short Term Goal: Increase Hgb    Interventions:   - Receive RBC  -Adequate nutrition and hydration  - See additional Care Plan goals for specific interventions  Outcome: Progressing     Problem: PAIN - ADULT  Goal: Verbalizes/displays adequate comfort level or patient's stated pain goal  Description: INTERVENTIONS:  - Encourage pt to monitor pain and request assistance  - Assess pain using appropriate pain scale  - Administer analgesics based on type and severity of pain and evaluate response  - Implement non-pharmacological measures as appropriate and evaluate response  - Consider cultural and social influences on pain and pain management  - Manage/alleviate anxiety  - Utilize distraction and/or relaxation techniques  - Monitor for opioid side effects  - Notify MD/LIP if interventions unsuccessful or patient reports new pain  - Anticipate increased pain with activity and pre-medicate as appropriate  Outcome: Progressing     Problem: RESPIRATORY - ADULT  Goal: Achieves optimal ventilation and oxygenation  Description: INTERVENTIONS:  - Assess for changes in respiratory status  - Assess for changes in mentation and behavior  - Position to facilitate oxygenation and minimize respiratory effort  - Oxygen supplementation based on oxygen saturation or ABGs  - Provide Smoking Cessation handout, if applicable  - Encourage broncho-pulmonary hygiene including cough, deep breathe, Incentive Spirometry  - Assess the need for suctioning and perform as needed  - Assess and instruct to report SOB or any respiratory difficulty  - Respiratory Therapy support as indicated  - Manage/alleviate anxiety  - Monitor for signs/symptoms of CO2 retention  Outcome: Progressing

## 2022-10-17 LAB
APTT PPP: 80.4 SECONDS (ref 23.3–35.6)
ATRIAL RATE: 75 BPM
BASOPHILS # BLD AUTO: 0.03 X10(3) UL (ref 0–0.2)
BASOPHILS NFR BLD AUTO: 0.3 %
BLOOD TYPE BARCODE: 1700
BLOOD TYPE BARCODE: 1700
BLOOD TYPE BARCODE: 7300
BLOOD TYPE BARCODE: 7300
EOSINOPHIL # BLD AUTO: 0.27 X10(3) UL (ref 0–0.7)
EOSINOPHIL NFR BLD AUTO: 2.5 %
ERYTHROCYTE [DISTWIDTH] IN BLOOD BY AUTOMATED COUNT: 21 %
HCT VFR BLD AUTO: 22.6 %
HGB BLD-MCNC: 6.8 G/DL
HGB RETIC QN AUTO: 16.5 PG (ref 28.2–36.6)
IMM GRANULOCYTES # BLD AUTO: 0.04 X10(3) UL (ref 0–1)
IMM GRANULOCYTES NFR BLD: 0.4 %
IMM RETICS NFR: 0.51 RATIO (ref 0.1–0.3)
LYMPHOCYTES # BLD AUTO: 2.07 X10(3) UL (ref 1–4)
LYMPHOCYTES NFR BLD AUTO: 19.5 %
MCH RBC QN AUTO: 22 PG (ref 26–34)
MCHC RBC AUTO-ENTMCNC: 30.1 G/DL (ref 31–37)
MCV RBC AUTO: 73.1 FL
MONOCYTES # BLD AUTO: 0.54 X10(3) UL (ref 0.1–1)
MONOCYTES NFR BLD AUTO: 5.1 %
NEUTROPHILS # BLD AUTO: 7.65 X10 (3) UL (ref 1.5–7.7)
NEUTROPHILS # BLD AUTO: 7.65 X10(3) UL (ref 1.5–7.7)
NEUTROPHILS NFR BLD AUTO: 72.2 %
P AXIS: 53 DEGREES
P-R INTERVAL: 184 MS
PLATELET # BLD AUTO: 299 10(3)UL (ref 150–450)
Q-T INTERVAL: 374 MS
QRS DURATION: 72 MS
QTC CALCULATION (BEZET): 417 MS
R AXIS: 16 DEGREES
RBC # BLD AUTO: 3.09 X10(6)UL
RETICS # AUTO: 35.4 X10(3) UL (ref 22.5–147.5)
RETICS/RBC NFR AUTO: 1.1 %
T AXIS: -10 DEGREES
VENTRICULAR RATE: 75 BPM
WBC # BLD AUTO: 10.6 X10(3) UL (ref 4–11)

## 2022-10-17 PROCEDURE — 99233 SBSQ HOSP IP/OBS HIGH 50: CPT | Performed by: HOSPITALIST

## 2022-10-17 PROCEDURE — 99233 SBSQ HOSP IP/OBS HIGH 50: CPT | Performed by: SPECIALIST

## 2022-10-17 RX ORDER — MEDROXYPROGESTERONE ACETATE 10 MG/1
10 TABLET ORAL 3 TIMES DAILY
Status: DISCONTINUED | OUTPATIENT
Start: 2022-10-17 | End: 2022-10-19

## 2022-10-17 RX ORDER — FOLIC ACID 1 MG/1
1 TABLET ORAL DAILY
Qty: 30 TABLET | Refills: 0 | Status: SHIPPED | OUTPATIENT
Start: 2022-10-17

## 2022-10-17 RX ORDER — FOLIC ACID 1 MG/1
1 TABLET ORAL DAILY
Status: DISCONTINUED | OUTPATIENT
Start: 2022-10-17 | End: 2022-10-19

## 2022-10-17 NOTE — DISCHARGE INSTRUCTIONS
Take folic acid 1 mg daily for 1 month to help red cell production. An order has been placed for bilateral diagnostic mammography and ultrasound (if needed). Please schedule at your convenience.

## 2022-10-17 NOTE — PLAN OF CARE
A&Ox4. Reports pain. Norco and tylenol given. Hep drip going at 16mL will adjust after ptt comes back. Will get IV iron today. Standby assist. 3L o2 NC. ECHO done yesterday.        Problem: Patient/Family Goals  Goal: Patient/Family Long Term Goal  Description: Patient's Long Term Goal: Go Home  Interventions:  - Receive RBCs  -Adequate nutrition and hydration  -Walk  - See additional Care Plan goals for specific interventions  Outcome: Progressing  Goal: Patient/Family Short Term Goal  Description: Patient's Short Term Goal: Increase Hgb    Interventions:   - Receive RBC  -Adequate nutrition and hydration  - See additional Care Plan goals for specific interventions  Outcome: Progressing     Problem: PAIN - ADULT  Goal: Verbalizes/displays adequate comfort level or patient's stated pain goal  Description: INTERVENTIONS:  - Encourage pt to monitor pain and request assistance  - Assess pain using appropriate pain scale  - Administer analgesics based on type and severity of pain and evaluate response  - Implement non-pharmacological measures as appropriate and evaluate response  - Consider cultural and social influences on pain and pain management  - Manage/alleviate anxiety  - Utilize distraction and/or relaxation techniques  - Monitor for opioid side effects  - Notify MD/LIP if interventions unsuccessful or patient reports new pain  - Anticipate increased pain with activity and pre-medicate as appropriate  Outcome: Progressing     Problem: RESPIRATORY - ADULT  Goal: Achieves optimal ventilation and oxygenation  Description: INTERVENTIONS:  - Assess for changes in respiratory status  - Assess for changes in mentation and behavior  - Position to facilitate oxygenation and minimize respiratory effort  - Oxygen supplementation based on oxygen saturation or ABGs  - Provide Smoking Cessation handout, if applicable  - Encourage broncho-pulmonary hygiene including cough, deep breathe, Incentive Spirometry  - Assess the need for suctioning and perform as needed  - Assess and instruct to report SOB or any respiratory difficulty  - Respiratory Therapy support as indicated  - Manage/alleviate anxiety  - Monitor for signs/symptoms of CO2 retention  Outcome: Progressing

## 2022-10-17 NOTE — CONSULTS
[facility]    PATIENT'S NAME: Molina Cruz   ATTENDING PHYSICIAN: LUIS Freire Peer: Callum Aguiar M.D. PATIENT ACCOUNT#:   [de-identified]    LOCATION:  [address] [place] [address]  MEDICAL RECORD #:   [id number]       YOB: 1975  ADMISSION DATE:       10/15/2022      CONSULT DATE:  10/16/2022    REPORT OF CONSULTATION    CHIEF COMPLAINT:  \"I'm having heavy periods. I have fibroids. \"    HISTORY OF PRESENT ILLNESS:  The patient is a 79-year-old female,  4, para 2-0-2-2, LMP 10/15/2022, who was admitted to the hospital secondary to complaints of shortness of breath and leg swelling. The patient was noted to have bilateral PEs and DVT. She does not have a history of either of these. She was also noted to be anemic and, therefore, was admitted for these reasons. The patient notes she has a history of heavy periods. They have been becoming heavier in the past 4 years. She notes her periods have been lasting longer than her normal 5-day periods. They have been extremely heavy, especially the first 3 days, and day 2 and 3 are her heaviest where she is changing a pad, a tampon, and using toilet paper, and changing this every hour at times. She also complains of dysmenorrhea as well. She does have a history of fibroid uterus. She is uncertain of the size. She had an ultrasound which she believes was performed in approximately . She has not had any followup since the ultrasound. PAST MEDICAL HISTORY:  History of arthritis, history of fibroids. PAST SURGICAL HISTORY:  In , ectopic pregnancy with salpingostomy. MEDICATIONS:  Present medications are vitamin D and ibuprofen. ALLERGIES:  No known drug allergies. No latex allergy. SOCIAL HISTORY:  No tobacco.  No ETOH. No drugs. 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      GYNECOLOGICAL HISTORY:  Menarche age 8.   History of regular menses, moderate x5 days until approximately 2 years ago. She presently uses condoms for contraception. No history of any sexually transmitted diseases. No history of an abnormal Pap smear. OBSTETRICAL HISTORY:  In , salpingostomy to be for a tubal pregnancy, in  methotrexate for a tubal pregnancy. On 1996, female infant, 7 pounds 11 ounces. She had  labor but not a  delivery. Her daughter, José Luis Castorena, was born in [facility] [place]. On 2007, male infant, 7 pounds 1 ounce. No complications. Her son, Josie Keating, was delivered at [facility] by Dr. Eagle duran. PHYSICAL EXAMINATION:    VITAL SIGNS:  Patient is 5 feet 6 inches. She weighs approximately 224 pounds. Blood pressure is 127/76, pulse is 73, temperature 99.8. ASSESSMENT AND PLAN:    1. A 51-year-old female,  4, para 2-0-2-2, with complaints of menorrhagia resulting in severe anemia. 2.   Leiomyoma of uterus. 3.   PE, DVT recently diagnosed and presently being treated. PLAN:  Patient presently being managed by Hematology and hospitalist for her medical problems. She has been transfused her second unit of blood. She is scheduled to have IV iron on 10/17/2022. She is presently menstruating and the second day of her menses. Discussed above with the patient. We will begin with gyne ultrasound to determine size and position of fibroids and then will further evaluate and discuss patient's options. Discussed importance of further evaluation and follow up to avoid severe anemia. All asked questions answered. We will follow up with the patient. Call as needed.     Dictated By Prashant Gallagher M.D.  d: 10/16/2022 14:17:19  t: 10/16/2022 14:44:52  Job [id number]  [facility]/

## 2022-10-17 NOTE — PLAN OF CARE
Received pt at 0730. AxOx4. 2L O2 w stats maintaining >90%. C/o menstrual cramps, tylenol given. Vitals stable. NSR on tele. Still having lots of vaginal bleeding. Please refer to flowsheets for further assessments. Heparin gtt infusing per DVT protocol. Plan to continue heparin gtt, start IV iron, monitor Hgb. Possible dc tomorrow w eliquis. Plan of care updated with pt and family, questions answered, verbalized understanding. Safety precautions in place. Instructed to call staff for help. Will continue to monitor.     Problem: Patient/Family Goals  Goal: Patient/Family Long Term Goal  Description: Patient's Long Term Goal: Go Home  Interventions:  - Receive RBCs  -Adequate nutrition and hydration  -Walk  - See additional Care Plan goals for specific interventions  Outcome: Progressing  Goal: Patient/Family Short Term Goal  Description: Patient's Short Term Goal: Increase Hgb    Interventions:   - Receive RBC  -Adequate nutrition and hydration  - See additional Care Plan goals for specific interventions  Outcome: Progressing     Problem: PAIN - ADULT  Goal: Verbalizes/displays adequate comfort level or patient's stated pain goal  Description: INTERVENTIONS:  - Encourage pt to monitor pain and request assistance  - Assess pain using appropriate pain scale  - Administer analgesics based on type and severity of pain and evaluate response  - Implement non-pharmacological measures as appropriate and evaluate response  - Consider cultural and social influences on pain and pain management  - Manage/alleviate anxiety  - Utilize distraction and/or relaxation techniques  - Monitor for opioid side effects  - Notify MD/LIP if interventions unsuccessful or patient reports new pain  - Anticipate increased pain with activity and pre-medicate as appropriate  Outcome: Progressing     Problem: RESPIRATORY - ADULT  Goal: Achieves optimal ventilation and oxygenation  Description: INTERVENTIONS:  - Assess for changes in respiratory status  - Assess for changes in mentation and behavior  - Position to facilitate oxygenation and minimize respiratory effort  - Oxygen supplementation based on oxygen saturation or ABGs  - Provide Smoking Cessation handout, if applicable  - Encourage broncho-pulmonary hygiene including cough, deep breathe, Incentive Spirometry  - Assess the need for suctioning and perform as needed  - Assess and instruct to report SOB or any respiratory difficulty  - Respiratory Therapy support as indicated  - Manage/alleviate anxiety  - Monitor for signs/symptoms of CO2 retention  Outcome: Progressing

## 2022-10-18 ENCOUNTER — APPOINTMENT (OUTPATIENT)
Dept: ULTRASOUND IMAGING | Facility: HOSPITAL | Age: 47
End: 2022-10-18
Attending: SPECIALIST
Payer: COMMERCIAL

## 2022-10-18 ENCOUNTER — APPOINTMENT (OUTPATIENT)
Dept: CV DIAGNOSTICS | Facility: HOSPITAL | Age: 47
End: 2022-10-18
Attending: SPECIALIST
Payer: COMMERCIAL

## 2022-10-18 ENCOUNTER — APPOINTMENT (OUTPATIENT)
Dept: GENERAL RADIOLOGY | Facility: HOSPITAL | Age: 47
End: 2022-10-18
Attending: SPECIALIST
Payer: COMMERCIAL

## 2022-10-18 LAB
ANION GAP SERPL CALC-SCNC: 6 MMOL/L (ref 0–18)
ANION GAP SERPL CALC-SCNC: 7 MMOL/L (ref 0–18)
APTT PPP: 111.2 SECONDS (ref 23.3–35.6)
APTT PPP: 83.7 SECONDS (ref 23.3–35.6)
BASOPHILS # BLD AUTO: 0.05 X10(3) UL (ref 0–0.2)
BASOPHILS NFR BLD AUTO: 0.5 %
BUN BLD-MCNC: 11 MG/DL (ref 7–18)
BUN BLD-MCNC: 12 MG/DL (ref 7–18)
CALCIUM BLD-MCNC: 8.5 MG/DL (ref 8.5–10.1)
CALCIUM BLD-MCNC: 8.8 MG/DL (ref 8.5–10.1)
CHLORIDE SERPL-SCNC: 110 MMOL/L (ref 98–112)
CHLORIDE SERPL-SCNC: 113 MMOL/L (ref 98–112)
CO2 SERPL-SCNC: 22 MMOL/L (ref 21–32)
CO2 SERPL-SCNC: 24 MMOL/L (ref 21–32)
CREAT BLD-MCNC: 1.01 MG/DL
CREAT BLD-MCNC: 1.02 MG/DL
EOSINOPHIL # BLD AUTO: 0.33 X10(3) UL (ref 0–0.7)
EOSINOPHIL NFR BLD AUTO: 3.3 %
ERYTHROCYTE [DISTWIDTH] IN BLOOD BY AUTOMATED COUNT: 22 %
GFR SERPLBLD BASED ON 1.73 SQ M-ARVRAT: 69 ML/MIN/1.73M2 (ref 60–?)
GFR SERPLBLD BASED ON 1.73 SQ M-ARVRAT: 70 ML/MIN/1.73M2 (ref 60–?)
GLUCOSE BLD-MCNC: 110 MG/DL (ref 70–99)
GLUCOSE BLD-MCNC: 111 MG/DL (ref 70–99)
HCT VFR BLD AUTO: 22.6 %
HGB BLD-MCNC: 6.7 G/DL
HGB BLD-MCNC: 7.6 G/DL
IMM GRANULOCYTES # BLD AUTO: 0.2 X10(3) UL (ref 0–1)
IMM GRANULOCYTES NFR BLD: 2 %
LYMPHOCYTES # BLD AUTO: 2.52 X10(3) UL (ref 1–4)
LYMPHOCYTES NFR BLD AUTO: 25.5 %
MCH RBC QN AUTO: 21.6 PG (ref 26–34)
MCHC RBC AUTO-ENTMCNC: 29.6 G/DL (ref 31–37)
MCV RBC AUTO: 72.9 FL
MONOCYTES # BLD AUTO: 0.57 X10(3) UL (ref 0.1–1)
MONOCYTES NFR BLD AUTO: 5.8 %
NEUTROPHILS # BLD AUTO: 6.23 X10 (3) UL (ref 1.5–7.7)
NEUTROPHILS # BLD AUTO: 6.23 X10(3) UL (ref 1.5–7.7)
NEUTROPHILS NFR BLD AUTO: 62.9 %
OSMOLALITY SERPL CALC.SUM OF ELEC: 292 MOSM/KG (ref 275–295)
OSMOLALITY SERPL CALC.SUM OF ELEC: 292 MOSM/KG (ref 275–295)
PLATELET # BLD AUTO: 307 10(3)UL (ref 150–450)
POTASSIUM SERPL-SCNC: 3.7 MMOL/L (ref 3.5–5.1)
POTASSIUM SERPL-SCNC: 3.7 MMOL/L (ref 3.5–5.1)
RBC # BLD AUTO: 3.1 X10(6)UL
SODIUM SERPL-SCNC: 141 MMOL/L (ref 136–145)
SODIUM SERPL-SCNC: 141 MMOL/L (ref 136–145)
WBC # BLD AUTO: 9.9 X10(3) UL (ref 4–11)

## 2022-10-18 PROCEDURE — 93306 TTE W/DOPPLER COMPLETE: CPT | Performed by: SPECIALIST

## 2022-10-18 PROCEDURE — 99232 SBSQ HOSP IP/OBS MODERATE 35: CPT | Performed by: HOSPITALIST

## 2022-10-18 PROCEDURE — 71046 X-RAY EXAM CHEST 2 VIEWS: CPT | Performed by: SPECIALIST

## 2022-10-18 PROCEDURE — 99233 SBSQ HOSP IP/OBS HIGH 50: CPT | Performed by: SPECIALIST

## 2022-10-18 PROCEDURE — 93971 EXTREMITY STUDY: CPT | Performed by: SPECIALIST

## 2022-10-18 NOTE — PLAN OF CARE
Problem: RESPIRATORY - ADULT  Goal: Achieves optimal ventilation and oxygenation  Description: INTERVENTIONS:  - Assess for changes in respiratory status  - Assess for changes in mentation and behavior  - Position to facilitate oxygenation and minimize respiratory effort  - Oxygen supplementation based on oxygen saturation or ABGs  - Provide Smoking Cessation handout, if applicable  - Encourage broncho-pulmonary hygiene including cough, deep breathe, Incentive Spirometry  - Assess the need for suctioning and perform as needed  - Assess and instruct to report SOB or any respiratory difficulty  - Respiratory Therapy support as indicated  - Manage/alleviate anxiety  - Monitor for signs/symptoms of CO2 retention  Outcome: Progressing     Problem: PAIN - ADULT  Goal: Verbalizes/displays adequate comfort level or patient's stated pain goal  Description: INTERVENTIONS:  - Encourage pt to monitor pain and request assistance  - Assess pain using appropriate pain scale  - Administer analgesics based on type and severity of pain and evaluate response  - Implement non-pharmacological measures as appropriate and evaluate response  - Consider cultural and social influences on pain and pain management  - Manage/alleviate anxiety  - Utilize distraction and/or relaxation techniques  - Monitor for opioid side effects  - Notify MD/LIP if interventions unsuccessful or patient reports new pain  - Anticipate increased pain with activity and pre-medicate as appropriate  Outcome: Progressing   Received in bed. Pt A&Ox4. Denies any CP at this time. Pt states \"still short of breath, but it's better now\". 02 at 2L per nc on upon assessment. Bilateral lung sounds clear. No BLE edema noted. Heparin gtt and IVF infusing as ordered. HGB 6.7 this am and 1 unit of PRBC's given earlier today as ordered; Hgb after blood transfusion, 7.6. Fall precautions continue. Will continue to monitor. Labs and meds as ordered. Up to chair tid for meals. Continue heparin gtt as ordered; IVF as ordered. Pain meds as ordered prn. Venous US, 2D Echo, and CXR today as ordered. Continue fall precautions.

## 2022-10-18 NOTE — PLAN OF CARE
A&Ox4. Reports pain. Tylenol given. Hep drip going at 14mL. Standby assist. 2L o2 NC. HBG 6.7 will transfuse 1 RBC.       Problem: Patient/Family Goals  Goal: Patient/Family Long Term Goal  Description: Patient's Long Term Goal: Go Home  Interventions:  - Receive RBCs  -Adequate nutrition and hydration  -Walk  - See additional Care Plan goals for specific interventions  Outcome: Progressing  Goal: Patient/Family Short Term Goal  Description: Patient's Short Term Goal: Increase Hgb    Interventions:   - Receive RBC  -Adequate nutrition and hydration  - See additional Care Plan goals for specific interventions  Outcome: Progressing     Problem: PAIN - ADULT  Goal: Verbalizes/displays adequate comfort level or patient's stated pain goal  Description: INTERVENTIONS:  - Encourage pt to monitor pain and request assistance  - Assess pain using appropriate pain scale  - Administer analgesics based on type and severity of pain and evaluate response  - Implement non-pharmacological measures as appropriate and evaluate response  - Consider cultural and social influences on pain and pain management  - Manage/alleviate anxiety  - Utilize distraction and/or relaxation techniques  - Monitor for opioid side effects  - Notify MD/LIP if interventions unsuccessful or patient reports new pain  - Anticipate increased pain with activity and pre-medicate as appropriate  Outcome: Progressing     Problem: RESPIRATORY - ADULT  Goal: Achieves optimal ventilation and oxygenation  Description: INTERVENTIONS:  - Assess for changes in respiratory status  - Assess for changes in mentation and behavior  - Position to facilitate oxygenation and minimize respiratory effort  - Oxygen supplementation based on oxygen saturation or ABGs  - Provide Smoking Cessation handout, if applicable  - Encourage broncho-pulmonary hygiene including cough, deep breathe, Incentive Spirometry  - Assess the need for suctioning and perform as needed  - Assess and instruct to report SOB or any respiratory difficulty  - Respiratory Therapy support as indicated  - Manage/alleviate anxiety  - Monitor for signs/symptoms of CO2 retention  Outcome: Progressing

## 2022-10-19 VITALS
OXYGEN SATURATION: 90 % | HEART RATE: 94 BPM | HEIGHT: 66 IN | BODY MASS INDEX: 36 KG/M2 | DIASTOLIC BLOOD PRESSURE: 73 MMHG | SYSTOLIC BLOOD PRESSURE: 133 MMHG | RESPIRATION RATE: 16 BRPM | WEIGHT: 224 LBS | TEMPERATURE: 98 F

## 2022-10-19 LAB
APTT PPP: 77.8 SECONDS (ref 23.3–35.6)
BASOPHILS # BLD AUTO: 0.05 X10(3) UL (ref 0–0.2)
BASOPHILS NFR BLD AUTO: 0.6 %
BLOOD TYPE BARCODE: 7300
BLOOD TYPE BARCODE: 7300
EOSINOPHIL # BLD AUTO: 0.46 X10(3) UL (ref 0–0.7)
EOSINOPHIL NFR BLD AUTO: 5.1 %
ERYTHROCYTE [DISTWIDTH] IN BLOOD BY AUTOMATED COUNT: 23.2 %
HCT VFR BLD AUTO: 24.8 %
HGB BLD-MCNC: 7.4 G/DL
IMM GRANULOCYTES # BLD AUTO: 0.17 X10(3) UL (ref 0–1)
IMM GRANULOCYTES NFR BLD: 1.9 %
LYMPHOCYTES # BLD AUTO: 2.1 X10(3) UL (ref 1–4)
LYMPHOCYTES NFR BLD AUTO: 23.2 %
MCH RBC QN AUTO: 22.4 PG (ref 26–34)
MCHC RBC AUTO-ENTMCNC: 29.8 G/DL (ref 31–37)
MCV RBC AUTO: 75.2 FL
MONOCYTES # BLD AUTO: 0.56 X10(3) UL (ref 0.1–1)
MONOCYTES NFR BLD AUTO: 6.2 %
NEUTROPHILS # BLD AUTO: 5.71 X10 (3) UL (ref 1.5–7.7)
NEUTROPHILS # BLD AUTO: 5.71 X10(3) UL (ref 1.5–7.7)
NEUTROPHILS NFR BLD AUTO: 63 %
PLATELET # BLD AUTO: 301 10(3)UL (ref 150–450)
PLATELET MORPHOLOGY: NORMAL
RBC # BLD AUTO: 3.3 X10(6)UL
WBC # BLD AUTO: 9.1 X10(3) UL (ref 4–11)

## 2022-10-19 PROCEDURE — 99233 SBSQ HOSP IP/OBS HIGH 50: CPT | Performed by: SPECIALIST

## 2022-10-19 PROCEDURE — 99239 HOSP IP/OBS DSCHRG MGMT >30: CPT | Performed by: INTERNAL MEDICINE

## 2022-10-19 RX ORDER — MEDROXYPROGESTERONE ACETATE 10 MG/1
20 TABLET ORAL 2 TIMES DAILY
Status: DISCONTINUED | OUTPATIENT
Start: 2022-10-19 | End: 2022-10-19

## 2022-10-19 RX ORDER — MEDROXYPROGESTERONE ACETATE 10 MG/1
20 TABLET ORAL 2 TIMES DAILY
Qty: 60 TABLET | Refills: 0 | Status: SHIPPED | OUTPATIENT
Start: 2022-10-19

## 2022-10-19 NOTE — PLAN OF CARE
Received patient at 0730. Alert and oriented x 4. Tele rhythm shows NSR. O2 saturation 98% on 2L NC. Breath sounds clear bilaterally. No C/O chest pain or SOB. Skin dry and intact. Bed is locked and in low position. Call light and personal items within reach. Reviewed plan of care and patient verbalizes understanding. Heparin drip stopped at 0845, first dose of Eliquis given.     Problem: Patient/Family Goals  Goal: Patient/Family Long Term Goal  Description: Patient's Long Term Goal: Go Home  Interventions:  - Receive RBCs  -Adequate nutrition and hydration  -Walk  - See additional Care Plan goals for specific interventions  Outcome: Progressing  Goal: Patient/Family Short Term Goal  Description: Patient's Short Term Goal: Increase Hgb    Interventions:   - Receive RBC  -Adequate nutrition and hydration  - See additional Care Plan goals for specific interventions  Outcome: Progressing     Problem: PAIN - ADULT  Goal: Verbalizes/displays adequate comfort level or patient's stated pain goal  Description: INTERVENTIONS:  - Encourage pt to monitor pain and request assistance  - Assess pain using appropriate pain scale  - Administer analgesics based on type and severity of pain and evaluate response  - Implement non-pharmacological measures as appropriate and evaluate response  - Consider cultural and social influences on pain and pain management  - Manage/alleviate anxiety  - Utilize distraction and/or relaxation techniques  - Monitor for opioid side effects  - Notify MD/LIP if interventions unsuccessful or patient reports new pain  - Anticipate increased pain with activity and pre-medicate as appropriate  Outcome: Progressing     Problem: RESPIRATORY - ADULT  Goal: Achieves optimal ventilation and oxygenation  Description: INTERVENTIONS:  - Assess for changes in respiratory status  - Assess for changes in mentation and behavior  - Position to facilitate oxygenation and minimize respiratory effort  - Oxygen supplementation based on oxygen saturation or ABGs  - Provide Smoking Cessation handout, if applicable  - Encourage broncho-pulmonary hygiene including cough, deep breathe, Incentive Spirometry  - Assess the need for suctioning and perform as needed  - Assess and instruct to report SOB or any respiratory difficulty  - Respiratory Therapy support as indicated  - Manage/alleviate anxiety  - Monitor for signs/symptoms of CO2 retention  Outcome: Progressing

## 2022-10-19 NOTE — PLAN OF CARE
Alert and oriented x4 on tele monitor hr 50's sinus villa. Heparin gtt at 1400 units/hr and ivf of 0.9nstl at 75 ml/hr in progress patent and intact. Denies any pain. All needs attended and will continue to monitor. Call light within reach.   Problem: Patient/Family Goals  Goal: Patient/Family Long Term Goal  Description: Patient's Long Term Goal: Go Home  Interventions:  - Receive RBCs  -Adequate nutrition and hydration  -Walk  - See additional Care Plan goals for specific interventions  Outcome: Progressing  Goal: Patient/Family Short Term Goal  Description: Patient's Short Term Goal: Increase Hgb    Interventions:   - Receive RBC  -Adequate nutrition and hydration  - See additional Care Plan goals for specific interventions  Outcome: Progressing     Problem: PAIN - ADULT  Goal: Verbalizes/displays adequate comfort level or patient's stated pain goal  Description: INTERVENTIONS:  - Encourage pt to monitor pain and request assistance  - Assess pain using appropriate pain scale  - Administer analgesics based on type and severity of pain and evaluate response  - Implement non-pharmacological measures as appropriate and evaluate response  - Consider cultural and social influences on pain and pain management  - Manage/alleviate anxiety  - Utilize distraction and/or relaxation techniques  - Monitor for opioid side effects  - Notify MD/LIP if interventions unsuccessful or patient reports new pain  - Anticipate increased pain with activity and pre-medicate as appropriate  Outcome: Progressing     Problem: RESPIRATORY - ADULT  Goal: Achieves optimal ventilation and oxygenation  Description: INTERVENTIONS:  - Assess for changes in respiratory status  - Assess for changes in mentation and behavior  - Position to facilitate oxygenation and minimize respiratory effort  - Oxygen supplementation based on oxygen saturation or ABGs  - Provide Smoking Cessation handout, if applicable  - Encourage broncho-pulmonary hygiene including cough, deep breathe, Incentive Spirometry  - Assess the need for suctioning and perform as needed  - Assess and instruct to report SOB or any respiratory difficulty  - Respiratory Therapy support as indicated  - Manage/alleviate anxiety  - Monitor for signs/symptoms of CO2 retention  Outcome: Progressing

## 2022-10-19 NOTE — PLAN OF CARE
Explained discharge instructions including medications and follow-up appointments to pt, verbalized understanding. IV removed. Tele monitor discontinued. Transported via wheelchair.     Problem: Patient/Family Goals  Goal: Patient/Family Long Term Goal  Description: Patient's Long Term Goal: Go Home  Interventions:  - Receive RBCs  -Adequate nutrition and hydration  -Walk  - See additional Care Plan goals for specific interventions  10/19/2022 1138 by Ni Arenas RN  Outcome: Completed  10/19/2022 1009 by Ni Arenas RN  Outcome: Progressing  Goal: Patient/Family Short Term Goal  Description: Patient's Short Term Goal: Increase Hgb    Interventions:   - Receive RBC  -Adequate nutrition and hydration  - See additional Care Plan goals for specific interventions  10/19/2022 1138 by Ni Arenas RN  Outcome: Completed  10/19/2022 1009 by Ni Arenas RN  Outcome: Progressing     Problem: PAIN - ADULT  Goal: Verbalizes/displays adequate comfort level or patient's stated pain goal  Description: INTERVENTIONS:  - Encourage pt to monitor pain and request assistance  - Assess pain using appropriate pain scale  - Administer analgesics based on type and severity of pain and evaluate response  - Implement non-pharmacological measures as appropriate and evaluate response  - Consider cultural and social influences on pain and pain management  - Manage/alleviate anxiety  - Utilize distraction and/or relaxation techniques  - Monitor for opioid side effects  - Notify MD/LIP if interventions unsuccessful or patient reports new pain  - Anticipate increased pain with activity and pre-medicate as appropriate  10/19/2022 1138 by Ni Arenas RN  Outcome: Completed  10/19/2022 1009 by Ni Arenas RN  Outcome: Progressing     Problem: RESPIRATORY - ADULT  Goal: Achieves optimal ventilation and oxygenation  Description: INTERVENTIONS:  - Assess for changes in respiratory status  - Assess for changes in mentation and behavior  - Position to facilitate oxygenation and minimize respiratory effort  - Oxygen supplementation based on oxygen saturation or ABGs  - Provide Smoking Cessation handout, if applicable  - Encourage broncho-pulmonary hygiene including cough, deep breathe, Incentive Spirometry  - Assess the need for suctioning and perform as needed  - Assess and instruct to report SOB or any respiratory difficulty  - Respiratory Therapy support as indicated  - Manage/alleviate anxiety  - Monitor for signs/symptoms of CO2 retention  10/19/2022 1138 by Amira Verduzco RN  Outcome: Completed  10/19/2022 1009 by Amira Verduzco RN  Outcome: Progressing

## 2022-10-20 ENCOUNTER — TELEPHONE (OUTPATIENT)
Dept: FAMILY MEDICINE CLINIC | Facility: CLINIC | Age: 47
End: 2022-10-20

## 2022-10-20 ENCOUNTER — APPOINTMENT (OUTPATIENT)
Dept: HEMATOLOGY/ONCOLOGY | Facility: HOSPITAL | Age: 47
End: 2022-10-20
Attending: SPECIALIST
Payer: COMMERCIAL

## 2022-10-20 ENCOUNTER — PATIENT OUTREACH (OUTPATIENT)
Dept: CASE MANAGEMENT | Age: 47
End: 2022-10-20

## 2022-10-20 DIAGNOSIS — I26.94 MULTIPLE SUBSEGMENTAL PULMONARY EMBOLI WITHOUT ACUTE COR PULMONALE (HCC): ICD-10-CM

## 2022-10-20 DIAGNOSIS — Z02.9 ENCOUNTERS FOR UNSPECIFIED ADMINISTRATIVE PURPOSE: ICD-10-CM

## 2022-10-20 PROCEDURE — 1111F DSCHRG MED/CURRENT MED MERGE: CPT

## 2022-10-20 NOTE — TELEPHONE ENCOUNTER
Sent as FYI:    Spoke to pt for TCM today. Patient confirms iron infusion appt 10/25/2022, cardiology f/u appt 11/17/2022 and hematology appt 11/21/2022--she will call to book rheumatology f/u appt. Patient declines TCM/HFU, unless absolutely required by Dr. Burgess Stage so, she is requesting video visit, as it would be easier to accommodate in her schedule. TCM/HFU appt recommended by 11/02/2022, as pt is a moderate risk for readmission. Please discuss with PCP and f/u with pt, accordingly. Thank you! Clary Thomas MD Family Medicine In 1 week  [address]   [phone]    Akhil Dietrich MD ONCOLOGY, HEMATOLOGY On 11/21/2022 10:30 am with Dr. Benjamin Pascual and blood work [address] [place] [address]   [phone]    May Bello, [address] [place] [address], Internal Medicine Schedule an appointment as soon as possible for a visit Rheumatology [address]  On 10/25/2022 10 am for Iron infusion [address]   Raleigh Bell MD CARDIOLOGY In 3 weeks Our office will call you to schedule an appointment.  [address] [place] [address]   [phone]      Future Appointments   Date Time Provider Joshua Brambila   10/25/2022 10:00 AM Abner Hardy [address] [place] [address]   11/21/2022 10:30 AM Akhil Dietrich MD [address] [place] [address]

## 2022-10-21 NOTE — TELEPHONE ENCOUNTER
As long as pt is clinically stable without new/worsened symptoms, Ok to follow-up after upcoming appts with Cardiology and Hematology.

## 2022-10-21 NOTE — TELEPHONE ENCOUNTER
Talked to patient told her message from Dr Wayne Hidalgo below understood and she will make appointment thru her Mychart.

## 2022-10-24 ENCOUNTER — TELEPHONE (OUTPATIENT)
Dept: HEMATOLOGY/ONCOLOGY | Facility: HOSPITAL | Age: 47
End: 2022-10-24

## 2022-10-24 NOTE — TELEPHONE ENCOUNTER
Jose Garcia wants to make sure that a order for labs are entered if needed for her am visit.  Thanks [employer]

## 2022-10-25 ENCOUNTER — OFFICE VISIT (OUTPATIENT)
Dept: HEMATOLOGY/ONCOLOGY | Facility: HOSPITAL | Age: 47
End: 2022-10-25
Attending: SPECIALIST
Payer: COMMERCIAL

## 2022-10-25 VITALS
BODY MASS INDEX: 36 KG/M2 | DIASTOLIC BLOOD PRESSURE: 67 MMHG | RESPIRATION RATE: 20 BRPM | SYSTOLIC BLOOD PRESSURE: 102 MMHG | TEMPERATURE: 99 F | OXYGEN SATURATION: 98 % | WEIGHT: 221 LBS | HEART RATE: 86 BPM

## 2022-10-25 DIAGNOSIS — D50.0 IRON DEFICIENCY ANEMIA SECONDARY TO BLOOD LOSS (CHRONIC): Primary | ICD-10-CM

## 2022-10-25 DIAGNOSIS — R06.89 BREATHING DIFFICULTY: ICD-10-CM

## 2022-10-25 DIAGNOSIS — T50.905A ADVERSE EFFECT OF DRUG, INITIAL ENCOUNTER: ICD-10-CM

## 2022-10-25 LAB
BASOPHILS # BLD AUTO: 0.06 X10(3) UL (ref 0–0.2)
BASOPHILS NFR BLD AUTO: 0.9 %
DEPRECATED HBV CORE AB SER IA-ACNC: 294.4 NG/ML
EOSINOPHIL # BLD AUTO: 0.07 X10(3) UL (ref 0–0.7)
EOSINOPHIL NFR BLD AUTO: 1.1 %
ERYTHROCYTE [DISTWIDTH] IN BLOOD BY AUTOMATED COUNT: 25.1 %
HCT VFR BLD AUTO: 32.6 %
HGB BLD-MCNC: 9.8 G/DL
IMM GRANULOCYTES # BLD AUTO: 0.02 X10(3) UL (ref 0–1)
IMM GRANULOCYTES NFR BLD: 0.3 %
IRON SATN MFR SERPL: 10 %
IRON SERPL-MCNC: 59 UG/DL
LYMPHOCYTES # BLD AUTO: 2.39 X10(3) UL (ref 1–4)
LYMPHOCYTES NFR BLD AUTO: 36.7 %
MCH RBC QN AUTO: 23.1 PG (ref 26–34)
MCHC RBC AUTO-ENTMCNC: 30.1 G/DL (ref 31–37)
MCV RBC AUTO: 76.9 FL
MONOCYTES # BLD AUTO: 0.36 X10(3) UL (ref 0.1–1)
MONOCYTES NFR BLD AUTO: 5.5 %
NEUTROPHILS # BLD AUTO: 3.62 X10 (3) UL (ref 1.5–7.7)
NEUTROPHILS # BLD AUTO: 3.62 X10(3) UL (ref 1.5–7.7)
NEUTROPHILS NFR BLD AUTO: 55.5 %
PLATELET # BLD AUTO: 328 10(3)UL (ref 150–450)
PLATELET MORPHOLOGY: NORMAL
RBC # BLD AUTO: 4.24 X10(6)UL
TIBC SERPL-MCNC: 608 UG/DL (ref 240–450)
TRANSFERRIN SERPL-MCNC: 408 MG/DL (ref 200–360)
WBC # BLD AUTO: 6.5 X10(3) UL (ref 4–11)

## 2022-10-25 PROCEDURE — 36415 COLL VENOUS BLD VENIPUNCTURE: CPT

## 2022-10-25 PROCEDURE — 96374 THER/PROPH/DIAG INJ IV PUSH: CPT

## 2022-10-25 PROCEDURE — 96375 TX/PRO/DX INJ NEW DRUG ADDON: CPT

## 2022-10-25 PROCEDURE — 85025 COMPLETE CBC W/AUTO DIFF WBC: CPT

## 2022-10-25 PROCEDURE — 82728 ASSAY OF FERRITIN: CPT

## 2022-10-25 PROCEDURE — 83550 IRON BINDING TEST: CPT

## 2022-10-25 PROCEDURE — 83540 ASSAY OF IRON: CPT

## 2022-10-25 PROCEDURE — 99215 OFFICE O/P EST HI 40 MIN: CPT | Performed by: CLINICAL NURSE SPECIALIST

## 2022-10-25 RX ORDER — FERUMOXYTOL 510 MG/17ML
510 INJECTION INTRAVENOUS ONCE
Status: DISCONTINUED | OUTPATIENT
Start: 2022-10-25 | End: 2022-10-25

## 2022-10-25 RX ORDER — FERUMOXYTOL 510 MG/17ML
510 INJECTION INTRAVENOUS ONCE
Status: CANCELLED
Start: 2022-10-25 | End: 2022-10-25

## 2022-10-25 RX ORDER — METHYLPREDNISOLONE SODIUM SUCCINATE 125 MG/2ML
125 INJECTION, POWDER, LYOPHILIZED, FOR SOLUTION INTRAMUSCULAR; INTRAVENOUS ONCE
Status: COMPLETED | OUTPATIENT
Start: 2022-10-25 | End: 2022-10-25

## 2022-10-25 RX ADMIN — METHYLPREDNISOLONE SODIUM SUCCINATE 125 MG: 125 INJECTION, POWDER, LYOPHILIZED, FOR SOLUTION INTRAMUSCULAR; INTRAVENOUS at 10:36:00

## 2022-10-25 NOTE — PROGRESS NOTES
Pt here for iron infusion. Arrives Ambulating independently, accompanied by Other self           Modifications in dose or schedule: No     Frequency of blood return and site check throughout administration: Prior to administration   Discharged to Home, Ambulating independently, accompanied by: Other self    Outpatient Oncology Care Plan  Problem list:  anemia  Problems related to:    disease/disease progression  Interventions:  administered iron  Expected outcomes:  optimal lab values  Progress towards outcome:  making progress    Education Record    Learner:  Patient  Barriers / Limitations:  None  Method:  Brief focused  Outcome:  Shows understanding  Comments:observed pt 30 min post infusion. Pt tolerated infusion. No c/o. VSS. See flowsheet  Medication involved - infed    Grade of reaction, patient symptoms at time of reaction - II--about one minute after administration of test dose, pt felt heavy in the head and chest tightness, and stomach cramping and flush. APN called--instructed to give 125mg solumedrol IV. Vital Signs taken - see flowsheets, VSS on RA    MD/APN/PA called to room, orders received - see orders. Symptoms resolved quickly, during administration of solumedrol. Pt felt like symptoms resolved other than mild HA. MD wanted to discontinue infed and switch to faraheme x2 doses. Confirmed with billing, no PA needed. Okay to give today. Appt made next week for second dose. Labs also drawn today. Outcome of reaction - other - switched to Faraheme       1215--tolerated faraheme. VSS. Observed for 30min after completion. Left in stable condition.

## 2022-11-02 ENCOUNTER — OFFICE VISIT (OUTPATIENT)
Dept: HEMATOLOGY/ONCOLOGY | Facility: HOSPITAL | Age: 47
End: 2022-11-02
Attending: SPECIALIST
Payer: COMMERCIAL

## 2022-11-02 VITALS
TEMPERATURE: 98 F | RESPIRATION RATE: 16 BRPM | HEART RATE: 94 BPM | DIASTOLIC BLOOD PRESSURE: 75 MMHG | OXYGEN SATURATION: 100 % | SYSTOLIC BLOOD PRESSURE: 131 MMHG

## 2022-11-02 DIAGNOSIS — D50.0 IRON DEFICIENCY ANEMIA SECONDARY TO BLOOD LOSS (CHRONIC): Primary | ICD-10-CM

## 2022-11-02 PROCEDURE — 96374 THER/PROPH/DIAG INJ IV PUSH: CPT

## 2022-11-07 RX ORDER — MEDROXYPROGESTERONE ACETATE 10 MG/1
TABLET ORAL
Qty: 60 TABLET | Refills: 0 | OUTPATIENT
Start: 2022-11-07

## 2022-11-08 NOTE — TELEPHONE ENCOUNTER
Jayden Gupta, RN routed conversation to You 1 hour ago (12:48 PM)     Gregoria Boo MD  Em Rn Triage 20 hours ago (5:32 PM)     Pt to follow-up with OBG for menses management while awaiting hysterectomy.       KEY Kulkarni MD 22 hours ago (3:52 PM)     [facility]  Not ordered by Marie Hernandez

## 2022-11-09 ENCOUNTER — IMMUNIZATION (OUTPATIENT)
Dept: LAB | Facility: HOSPITAL | Age: 47
End: 2022-11-09
Attending: PREVENTIVE MEDICINE
Payer: COMMERCIAL

## 2022-11-09 DIAGNOSIS — Z23 NEED FOR VACCINATION: Primary | ICD-10-CM

## 2022-11-09 PROCEDURE — 90471 IMMUNIZATION ADMIN: CPT

## 2022-11-10 DIAGNOSIS — D50.0 IRON DEFICIENCY ANEMIA SECONDARY TO BLOOD LOSS (CHRONIC): Primary | ICD-10-CM

## 2022-11-11 DIAGNOSIS — N92.0 MENORRHAGIA WITH REGULAR CYCLE: ICD-10-CM

## 2022-11-11 RX ORDER — FOLIC ACID 1 MG/1
TABLET ORAL
Qty: 30 TABLET | Refills: 0 | Status: SHIPPED | OUTPATIENT
Start: 2022-11-11

## 2022-11-15 ENCOUNTER — HOSPITAL ENCOUNTER (OUTPATIENT)
Dept: MAMMOGRAPHY | Age: 47
Discharge: HOME | End: 2022-11-15
Attending: SPECIALIST
Payer: COMMERCIAL

## 2022-11-15 ENCOUNTER — LAB ENCOUNTER (OUTPATIENT)
Dept: LAB | Age: 47
End: 2022-11-15
Attending: SPECIALIST
Payer: COMMERCIAL

## 2022-11-15 DIAGNOSIS — N63.20 MASS OF LEFT BREAST, UNSPECIFIED QUADRANT: ICD-10-CM

## 2022-11-15 DIAGNOSIS — D50.0 IRON DEFICIENCY ANEMIA SECONDARY TO BLOOD LOSS (CHRONIC): ICD-10-CM

## 2022-11-15 LAB
BASOPHILS # BLD AUTO: 0.03 X10(3) UL (ref 0–0.2)
BASOPHILS NFR BLD AUTO: 0.4 %
EOSINOPHIL # BLD AUTO: 0.04 X10(3) UL (ref 0–0.7)
EOSINOPHIL NFR BLD AUTO: 0.5 %
ERYTHROCYTE [DISTWIDTH] IN BLOOD BY AUTOMATED COUNT: 28 %
HCT VFR BLD AUTO: 31.8 %
HGB BLD-MCNC: 9.7 G/DL
IMM GRANULOCYTES # BLD AUTO: 0.02 X10(3) UL (ref 0–1)
IMM GRANULOCYTES NFR BLD: 0.3 %
LYMPHOCYTES # BLD AUTO: 2.86 X10(3) UL (ref 1–4)
LYMPHOCYTES NFR BLD AUTO: 37.2 %
MCH RBC QN AUTO: 26.5 PG (ref 26–34)
MCHC RBC AUTO-ENTMCNC: 30.5 G/DL (ref 31–37)
MCV RBC AUTO: 86.9 FL
MONOCYTES # BLD AUTO: 0.5 X10(3) UL (ref 0.1–1)
MONOCYTES NFR BLD AUTO: 6.5 %
NEUTROPHILS # BLD AUTO: 4.23 X10 (3) UL (ref 1.5–7.7)
NEUTROPHILS # BLD AUTO: 4.23 X10(3) UL (ref 1.5–7.7)
NEUTROPHILS NFR BLD AUTO: 55.1 %
PLATELET # BLD AUTO: 305 10(3)UL (ref 150–450)
PLATELET MORPHOLOGY: NORMAL
RBC # BLD AUTO: 3.66 X10(6)UL
WBC # BLD AUTO: 7.7 X10(3) UL (ref 4–11)

## 2022-11-15 PROCEDURE — 85025 COMPLETE CBC W/AUTO DIFF WBC: CPT

## 2022-11-15 PROCEDURE — 36415 COLL VENOUS BLD VENIPUNCTURE: CPT

## 2022-11-15 PROCEDURE — 77062 BREAST TOMOSYNTHESIS BI: CPT | Performed by: SPECIALIST

## 2022-11-15 PROCEDURE — 77066 DX MAMMO INCL CAD BI: CPT | Performed by: SPECIALIST

## 2022-11-21 ENCOUNTER — OFFICE VISIT (OUTPATIENT)
Dept: HEMATOLOGY/ONCOLOGY | Facility: HOSPITAL | Age: 47
End: 2022-11-21
Attending: SPECIALIST
Payer: COMMERCIAL

## 2022-11-21 VITALS
RESPIRATION RATE: 18 BRPM | SYSTOLIC BLOOD PRESSURE: 114 MMHG | TEMPERATURE: 98 F | BODY MASS INDEX: 37 KG/M2 | DIASTOLIC BLOOD PRESSURE: 75 MMHG | OXYGEN SATURATION: 100 % | WEIGHT: 228 LBS | HEART RATE: 84 BPM

## 2022-11-21 DIAGNOSIS — I82.401 LEG DVT (DEEP VENOUS THROMBOEMBOLISM), ACUTE, RIGHT (HCC): ICD-10-CM

## 2022-11-21 DIAGNOSIS — D64.9 NORMOCYTIC NORMOCHROMIC ANEMIA: ICD-10-CM

## 2022-11-21 DIAGNOSIS — I26.94 MULTIPLE SUBSEGMENTAL PULMONARY EMBOLI WITHOUT ACUTE COR PULMONALE (HCC): ICD-10-CM

## 2022-11-21 DIAGNOSIS — D50.0 IRON DEFICIENCY ANEMIA SECONDARY TO BLOOD LOSS (CHRONIC): Primary | ICD-10-CM

## 2022-11-21 DIAGNOSIS — N92.0 MENORRHAGIA WITH REGULAR CYCLE: ICD-10-CM

## 2022-11-21 DIAGNOSIS — E53.8 VITAMIN B12 DEFICIENCY: ICD-10-CM

## 2022-11-21 LAB
BASOPHILS # BLD AUTO: 0.02 X10(3) UL (ref 0–0.2)
BASOPHILS NFR BLD AUTO: 0.4 %
DEPRECATED HBV CORE AB SER IA-ACNC: 141.9 NG/ML
EOSINOPHIL # BLD AUTO: 0.03 X10(3) UL (ref 0–0.7)
EOSINOPHIL NFR BLD AUTO: 0.6 %
ERYTHROCYTE [DISTWIDTH] IN BLOOD BY AUTOMATED COUNT: 26.4 %
FOLATE SERPL-MCNC: 21.8 NG/ML (ref 8.7–?)
HCT VFR BLD AUTO: 31.7 %
HGB BLD-MCNC: 9.8 G/DL
IMM GRANULOCYTES # BLD AUTO: 0.02 X10(3) UL (ref 0–1)
IMM GRANULOCYTES NFR BLD: 0.4 %
IRON SATN MFR SERPL: 11 %
IRON SERPL-MCNC: 40 UG/DL
LYMPHOCYTES # BLD AUTO: 0.77 X10(3) UL (ref 1–4)
LYMPHOCYTES NFR BLD AUTO: 15.2 %
MCH RBC QN AUTO: 27.1 PG (ref 26–34)
MCHC RBC AUTO-ENTMCNC: 30.9 G/DL (ref 31–37)
MCV RBC AUTO: 87.6 FL
MONOCYTES # BLD AUTO: 0.4 X10(3) UL (ref 0.1–1)
MONOCYTES NFR BLD AUTO: 7.9 %
NEUTROPHILS # BLD AUTO: 3.83 X10 (3) UL (ref 1.5–7.7)
NEUTROPHILS # BLD AUTO: 3.83 X10(3) UL (ref 1.5–7.7)
NEUTROPHILS NFR BLD AUTO: 75.5 %
PLATELET # BLD AUTO: 261 10(3)UL (ref 150–450)
PLATELET MORPHOLOGY: NORMAL
RBC # BLD AUTO: 3.62 X10(6)UL
TIBC SERPL-MCNC: 371 UG/DL (ref 240–450)
TRANSFERRIN SERPL-MCNC: 249 MG/DL (ref 200–360)
VIT B12 SERPL-MCNC: 188 PG/ML (ref 193–986)
WBC # BLD AUTO: 5.1 X10(3) UL (ref 4–11)

## 2022-11-21 PROCEDURE — 99214 OFFICE O/P EST MOD 30 MIN: CPT | Performed by: SPECIALIST

## 2022-11-21 RX ORDER — CYANOCOBALAMIN 1000 UG/ML
1000 INJECTION, SOLUTION INTRAMUSCULAR; SUBCUTANEOUS AS NEEDED
Start: 2022-11-21

## 2022-11-21 NOTE — PROGRESS NOTES
Patient is here today for post hospital follow up with Dr. Lon Sy . Patient is on Eliquis therapy. Iron deficiency anemia. Patient denies pain. Medication list and medical history  were reviewed and updated. Education Record    Learner:  Patient and spouse    Disease / Diagnosis: PE/DVT/ Iron deficiency    Barriers / Limitations:  None   Comments:    Method:  Brief focused, Discussion, Printed material and Reinforcement   Comments:    General Topics:  Medication, Pain, Procedure and Plan of care reviewed   Comments:    Outcome:  Shows understanding   Comments:    AVS provided and follow up reviewed. Patient instructed to call as needed.

## 2022-11-25 LAB — MMA: 0.1 UMOL/L

## 2022-12-08 ENCOUNTER — OFFICE VISIT (OUTPATIENT)
Dept: HEMATOLOGY/ONCOLOGY | Facility: HOSPITAL | Age: 47
End: 2022-12-08
Attending: SPECIALIST
Payer: COMMERCIAL

## 2022-12-08 VITALS
HEART RATE: 101 BPM | SYSTOLIC BLOOD PRESSURE: 132 MMHG | TEMPERATURE: 98 F | RESPIRATION RATE: 18 BRPM | DIASTOLIC BLOOD PRESSURE: 85 MMHG | OXYGEN SATURATION: 100 %

## 2022-12-08 DIAGNOSIS — E53.8 VITAMIN B12 DEFICIENCY: Primary | ICD-10-CM

## 2022-12-08 DIAGNOSIS — D50.0 IRON DEFICIENCY ANEMIA SECONDARY TO BLOOD LOSS (CHRONIC): ICD-10-CM

## 2022-12-08 PROCEDURE — 36415 COLL VENOUS BLD VENIPUNCTURE: CPT

## 2022-12-08 PROCEDURE — 96374 THER/PROPH/DIAG INJ IV PUSH: CPT

## 2022-12-08 PROCEDURE — 96372 THER/PROPH/DIAG INJ SC/IM: CPT

## 2022-12-08 PROCEDURE — 86340 INTRINSIC FACTOR ANTIBODY: CPT

## 2022-12-08 RX ORDER — CYANOCOBALAMIN 1000 UG/ML
1000 INJECTION, SOLUTION INTRAMUSCULAR; SUBCUTANEOUS AS NEEDED
Start: 2022-12-15

## 2022-12-08 RX ORDER — CYANOCOBALAMIN 1000 UG/ML
1000 INJECTION, SOLUTION INTRAMUSCULAR; SUBCUTANEOUS AS NEEDED
Status: DISCONTINUED | OUTPATIENT
Start: 2022-12-08 | End: 2022-12-08

## 2022-12-08 RX ADMIN — CYANOCOBALAMIN 1000 MCG: 1000 INJECTION, SOLUTION INTRAMUSCULAR; SUBCUTANEOUS at 11:39:00

## 2022-12-08 NOTE — PROGRESS NOTES
Education Record    Learner:  Patient and Spouse    Disease / Diagnosis:iron deficiency, vit b12 deficiency    Barriers / Limitations:  None   Comments:    Method:  Discussion   Comments:    General Topics:  Medication, Side effects and symptom management and Plan of care reviewed   Comments:    Outcome:  Shows understanding   Comments:appt scheduled next week for feraheme and vit b12 injection, and weekly b12 injection appointments scheduled weekly x 4. Patient does not wish to schedule her monthly appointment for vit b12 at this time until she has her work schedule.

## 2022-12-10 LAB — INTRINSIC FACTOR BLOCKING AB: NEGATIVE

## 2022-12-14 ENCOUNTER — OFFICE VISIT (OUTPATIENT)
Dept: HEMATOLOGY/ONCOLOGY | Facility: HOSPITAL | Age: 47
End: 2022-12-14
Attending: SPECIALIST
Payer: COMMERCIAL

## 2022-12-14 VITALS
OXYGEN SATURATION: 97 % | SYSTOLIC BLOOD PRESSURE: 92 MMHG | HEART RATE: 80 BPM | BODY MASS INDEX: 37 KG/M2 | RESPIRATION RATE: 16 BRPM | WEIGHT: 228 LBS | DIASTOLIC BLOOD PRESSURE: 56 MMHG | TEMPERATURE: 98 F

## 2022-12-14 DIAGNOSIS — D50.0 IRON DEFICIENCY ANEMIA SECONDARY TO BLOOD LOSS (CHRONIC): Primary | ICD-10-CM

## 2022-12-14 PROCEDURE — 96365 THER/PROPH/DIAG IV INF INIT: CPT

## 2022-12-14 PROCEDURE — 96372 THER/PROPH/DIAG INJ SC/IM: CPT

## 2022-12-14 RX ORDER — CYANOCOBALAMIN 1000 UG/ML
1000 INJECTION, SOLUTION INTRAMUSCULAR; SUBCUTANEOUS AS NEEDED
Start: 2022-12-21

## 2022-12-14 RX ORDER — CYANOCOBALAMIN 1000 UG/ML
1000 INJECTION, SOLUTION INTRAMUSCULAR; SUBCUTANEOUS AS NEEDED
Status: DISCONTINUED | OUTPATIENT
Start: 2022-12-14 | End: 2022-12-14

## 2022-12-14 RX ADMIN — CYANOCOBALAMIN 1000 MCG: 1000 INJECTION, SOLUTION INTRAMUSCULAR; SUBCUTANEOUS at 12:35:00

## 2022-12-14 NOTE — PROGRESS NOTES
Education Record    Learner:  Patient    Disease / Diagnosis: anemia    Barriers / Limitations:  None   Comments:    Method:  Discussion   Comments:    General Topics:  Medication and Plan of care reviewed   Comments:    Outcome:  Shows understanding   Comments:    Feraheme and B12 injection given. VS stable 30 min post infusion.

## 2022-12-21 ENCOUNTER — APPOINTMENT (OUTPATIENT)
Dept: HEMATOLOGY/ONCOLOGY | Facility: HOSPITAL | Age: 47
End: 2022-12-21
Attending: SPECIALIST
Payer: COMMERCIAL

## 2022-12-28 ENCOUNTER — APPOINTMENT (OUTPATIENT)
Dept: HEMATOLOGY/ONCOLOGY | Facility: HOSPITAL | Age: 47
End: 2022-12-28
Attending: SPECIALIST
Payer: COMMERCIAL

## 2023-01-02 DIAGNOSIS — N92.0 MENORRHAGIA WITH REGULAR CYCLE: ICD-10-CM

## 2023-01-02 RX ORDER — FOLIC ACID 1 MG/1
1 TABLET ORAL DAILY
Qty: 30 TABLET | Refills: 0 | Status: CANCELLED | OUTPATIENT
Start: 2023-01-02

## 2023-01-07 DIAGNOSIS — N92.0 MENORRHAGIA WITH REGULAR CYCLE: ICD-10-CM

## 2023-01-09 RX ORDER — FOLIC ACID 1 MG/1
1 TABLET ORAL DAILY
Qty: 30 TABLET | Refills: 0 | Status: SHIPPED | OUTPATIENT
Start: 2023-01-09

## 2023-01-10 RX ORDER — FOLIC ACID 1 MG/1
TABLET ORAL
Qty: 30 TABLET | Refills: 0 | OUTPATIENT
Start: 2023-01-10

## 2023-01-11 ENCOUNTER — HOSPITAL ENCOUNTER (OUTPATIENT)
Dept: ULTRASOUND IMAGING | Facility: HOSPITAL | Age: 48
Discharge: HOME | End: 2023-01-11
Attending: SPECIALIST
Payer: COMMERCIAL

## 2023-01-11 DIAGNOSIS — I82.401 LEG DVT (DEEP VENOUS THROMBOEMBOLISM), ACUTE, RIGHT (HCC): ICD-10-CM

## 2023-01-11 PROCEDURE — 93971 EXTREMITY STUDY: CPT | Performed by: SPECIALIST

## 2023-01-11 NOTE — TELEPHONE ENCOUNTER
MARLO calling she needs a 90 day supply of apixaban 5 MG Oral Tab her ins will not cover will not cover 30 day supply.  Donell Dave

## 2023-01-12 ENCOUNTER — TELEPHONE (OUTPATIENT)
Dept: HEMATOLOGY/ONCOLOGY | Facility: HOSPITAL | Age: 48
End: 2023-01-12

## 2023-01-12 NOTE — TELEPHONE ENCOUNTER
Left message on patient voicemail. Instructed patient to respond to Elba Alford My Chart Message than we can resolve the issue of Eliquis. If she cannot respond to My Chart she should call our office.

## 2023-02-15 ENCOUNTER — HOSPITAL ENCOUNTER (EMERGENCY)
Age: 48
Discharge: HOME | End: 2023-02-15
Attending: EMERGENCY MEDICINE
Payer: COMMERCIAL

## 2023-02-15 ENCOUNTER — APPOINTMENT (OUTPATIENT)
Dept: ULTRASOUND IMAGING | Age: 48
End: 2023-02-15
Attending: EMERGENCY MEDICINE
Payer: COMMERCIAL

## 2023-02-15 VITALS
RESPIRATION RATE: 18 BRPM | TEMPERATURE: 97 F | HEIGHT: 66 IN | HEART RATE: 82 BPM | BODY MASS INDEX: 37.77 KG/M2 | OXYGEN SATURATION: 98 % | SYSTOLIC BLOOD PRESSURE: 110 MMHG | WEIGHT: 235 LBS | DIASTOLIC BLOOD PRESSURE: 58 MMHG

## 2023-02-15 DIAGNOSIS — S39.011A STRAIN OF MUSCLE OF RIGHT GROIN REGION: Primary | ICD-10-CM

## 2023-02-15 PROCEDURE — 93971 EXTREMITY STUDY: CPT | Performed by: EMERGENCY MEDICINE

## 2023-02-15 PROCEDURE — 99284 EMERGENCY DEPT VISIT MOD MDM: CPT

## 2023-02-16 DIAGNOSIS — D50.0 IRON DEFICIENCY ANEMIA SECONDARY TO BLOOD LOSS (CHRONIC): Primary | ICD-10-CM

## 2023-02-16 DIAGNOSIS — E53.8 VITAMIN B12 DEFICIENCY: ICD-10-CM

## 2023-02-16 DIAGNOSIS — I82.401 LEG DVT (DEEP VENOUS THROMBOEMBOLISM), ACUTE, RIGHT (HCC): ICD-10-CM

## 2023-02-16 NOTE — ED INITIAL ASSESSMENT (HPI)
Pt to ed with c/o right hip pain that radiates into right groin area and pulling in right calf x 4-5 days. HX of blood clot in 10/22, stopped blood thinners 2 weeks ago.

## 2023-03-02 ENCOUNTER — LAB ENCOUNTER (OUTPATIENT)
Dept: LAB | Facility: HOSPITAL | Age: 48
End: 2023-03-02
Attending: SPECIALIST
Payer: COMMERCIAL

## 2023-03-02 DIAGNOSIS — D50.0 IRON DEFICIENCY ANEMIA SECONDARY TO BLOOD LOSS (CHRONIC): ICD-10-CM

## 2023-03-02 DIAGNOSIS — I82.401 LEG DVT (DEEP VENOUS THROMBOEMBOLISM), ACUTE, RIGHT (HCC): ICD-10-CM

## 2023-03-02 DIAGNOSIS — E53.8 VITAMIN B12 DEFICIENCY: ICD-10-CM

## 2023-03-02 LAB
BASOPHILS # BLD AUTO: 0.03 X10(3) UL (ref 0–0.2)
BASOPHILS NFR BLD AUTO: 0.3 %
DEPRECATED HBV CORE AB SER IA-ACNC: 5.6 NG/ML
DEPRECATED RDW RBC AUTO: 42.8 FL (ref 35.1–46.3)
EOSINOPHIL # BLD AUTO: 0.04 X10(3) UL (ref 0–0.7)
EOSINOPHIL NFR BLD AUTO: 0.4 %
ERYTHROCYTE [DISTWIDTH] IN BLOOD BY AUTOMATED COUNT: 13.3 % (ref 11–15)
FOLATE SERPL-MCNC: >20 NG/ML (ref 8.7–?)
HCT VFR BLD AUTO: 30.8 %
HGB BLD-MCNC: 9.9 G/DL
IMM GRANULOCYTES # BLD AUTO: 0.03 X10(3) UL (ref 0–1)
IMM GRANULOCYTES NFR BLD: 0.3 %
IRON SATN MFR SERPL: 5 %
IRON SERPL-MCNC: 23 UG/DL
LYMPHOCYTES # BLD AUTO: 2.89 X10(3) UL (ref 1–4)
LYMPHOCYTES NFR BLD AUTO: 29.2 %
MCH RBC QN AUTO: 27.9 PG (ref 26–34)
MCHC RBC AUTO-ENTMCNC: 32.1 G/DL (ref 31–37)
MCV RBC AUTO: 86.8 FL
MONOCYTES # BLD AUTO: 0.54 X10(3) UL (ref 0.1–1)
MONOCYTES NFR BLD AUTO: 5.4 %
NEUTROPHILS # BLD AUTO: 6.38 X10 (3) UL (ref 1.5–7.7)
NEUTROPHILS # BLD AUTO: 6.38 X10(3) UL (ref 1.5–7.7)
NEUTROPHILS NFR BLD AUTO: 64.4 %
PLATELET # BLD AUTO: 355 10(3)UL (ref 150–450)
RBC # BLD AUTO: 3.55 X10(6)UL
TIBC SERPL-MCNC: 475 UG/DL (ref 240–450)
TRANSFERRIN SERPL-MCNC: 319 MG/DL (ref 200–360)
VIT B12 SERPL-MCNC: 539 PG/ML (ref 193–986)
WBC # BLD AUTO: 9.9 X10(3) UL (ref 4–11)

## 2023-03-02 PROCEDURE — 36415 COLL VENOUS BLD VENIPUNCTURE: CPT

## 2023-03-02 PROCEDURE — 82728 ASSAY OF FERRITIN: CPT

## 2023-03-02 PROCEDURE — 82746 ASSAY OF FOLIC ACID SERUM: CPT

## 2023-03-02 PROCEDURE — 82607 VITAMIN B-12: CPT

## 2023-03-02 PROCEDURE — 85025 COMPLETE CBC W/AUTO DIFF WBC: CPT

## 2023-03-02 PROCEDURE — 83540 ASSAY OF IRON: CPT

## 2023-03-02 PROCEDURE — 84466 ASSAY OF TRANSFERRIN: CPT

## 2023-03-07 ENCOUNTER — OFFICE VISIT (OUTPATIENT)
Dept: HEMATOLOGY/ONCOLOGY | Facility: HOSPITAL | Age: 48
End: 2023-03-07
Attending: SPECIALIST
Payer: COMMERCIAL

## 2023-03-07 VITALS
SYSTOLIC BLOOD PRESSURE: 135 MMHG | BODY MASS INDEX: 40 KG/M2 | HEART RATE: 91 BPM | TEMPERATURE: 98 F | RESPIRATION RATE: 18 BRPM | OXYGEN SATURATION: 99 % | DIASTOLIC BLOOD PRESSURE: 83 MMHG | WEIGHT: 246.5 LBS

## 2023-03-07 VITALS — SYSTOLIC BLOOD PRESSURE: 104 MMHG | DIASTOLIC BLOOD PRESSURE: 69 MMHG | HEART RATE: 85 BPM

## 2023-03-07 DIAGNOSIS — D50.0 IRON DEFICIENCY ANEMIA SECONDARY TO BLOOD LOSS (CHRONIC): ICD-10-CM

## 2023-03-07 DIAGNOSIS — E53.8 VITAMIN B12 DEFICIENCY: Primary | ICD-10-CM

## 2023-03-07 DIAGNOSIS — D50.0 IRON DEFICIENCY ANEMIA SECONDARY TO BLOOD LOSS (CHRONIC): Primary | ICD-10-CM

## 2023-03-07 DIAGNOSIS — I26.94 MULTIPLE SUBSEGMENTAL PULMONARY EMBOLI WITHOUT ACUTE COR PULMONALE (HCC): ICD-10-CM

## 2023-03-07 DIAGNOSIS — I82.401 LEG DVT (DEEP VENOUS THROMBOEMBOLISM), ACUTE, RIGHT (HCC): ICD-10-CM

## 2023-03-07 PROCEDURE — 96374 THER/PROPH/DIAG INJ IV PUSH: CPT

## 2023-03-07 PROCEDURE — 99215 OFFICE O/P EST HI 40 MIN: CPT | Performed by: SPECIALIST

## 2023-03-07 NOTE — PROGRESS NOTES
Education Record    Learner:  Patient    Disease / Diagnosis: CELINE    Barriers / Limitations:  None   Comments:    Method:  Brief focused and Reinforcement   Comments:    General Topics:  Diet, Medication, Side effects and symptom management and Plan of care reviewed   Comments:    Outcome:  Shows understanding   Comments: Feraheme infused without any complications. Observed for half hour after infusion. Vital signs taken at the end of the 30-minute observation. Patient denied any complaints/issues. Discharged in good condition. Advised to call for any questions/concerns/issues.

## 2023-03-07 NOTE — PROGRESS NOTES
Patient is here today for follow up with Fina Alarcon for Iron Deficiency Anemia. Patient denies pain. Fatigued. Has hysterectomy scheduled for 3/20/2023. Medication list,medical history and toxicities were reviewed and updated. Education Record    Learner:  Patient     Disease / Diagnosis: Iron deficiency anemia    Barriers / Limitations:  None   Comments:    Method:  Brief focused, Discussion, Printed material and Reinforcement   Comments:    General Topics:  Medication, Pain, Procedure and Plan of care reviewed   Comments:    Outcome:  Shows understanding   Comments:  Ramin Liriano MD visit. Patient ready for Iron infusion today. AVS provided and follow up reviewed. Patient instructed to call as needed.

## 2023-03-13 ENCOUNTER — OFFICE VISIT (OUTPATIENT)
Dept: HEMATOLOGY/ONCOLOGY | Facility: HOSPITAL | Age: 48
End: 2023-03-13
Attending: SPECIALIST
Payer: COMMERCIAL

## 2023-03-13 VITALS
HEART RATE: 74 BPM | TEMPERATURE: 97 F | OXYGEN SATURATION: 98 % | RESPIRATION RATE: 18 BRPM | SYSTOLIC BLOOD PRESSURE: 122 MMHG | DIASTOLIC BLOOD PRESSURE: 78 MMHG

## 2023-03-13 DIAGNOSIS — D50.0 IRON DEFICIENCY ANEMIA SECONDARY TO BLOOD LOSS (CHRONIC): Primary | ICD-10-CM

## 2023-03-13 LAB
BASOPHILS # BLD AUTO: 0.02 X10(3) UL (ref 0–0.2)
BASOPHILS NFR BLD AUTO: 0.3 %
EOSINOPHIL # BLD AUTO: 0.05 X10(3) UL (ref 0–0.7)
EOSINOPHIL NFR BLD AUTO: 0.6 %
ERYTHROCYTE [DISTWIDTH] IN BLOOD BY AUTOMATED COUNT: 15.8 %
HCT VFR BLD AUTO: 31.5 %
HGB BLD-MCNC: 10 G/DL
IMM GRANULOCYTES # BLD AUTO: 0.05 X10(3) UL (ref 0–1)
IMM GRANULOCYTES NFR BLD: 0.6 %
LYMPHOCYTES # BLD AUTO: 2.79 X10(3) UL (ref 1–4)
LYMPHOCYTES NFR BLD AUTO: 35.4 %
MCH RBC QN AUTO: 27.7 PG (ref 26–34)
MCHC RBC AUTO-ENTMCNC: 31.7 G/DL (ref 31–37)
MCV RBC AUTO: 87.3 FL
MONOCYTES # BLD AUTO: 0.47 X10(3) UL (ref 0.1–1)
MONOCYTES NFR BLD AUTO: 6 %
NEUTROPHILS # BLD AUTO: 4.5 X10 (3) UL (ref 1.5–7.7)
NEUTROPHILS # BLD AUTO: 4.5 X10(3) UL (ref 1.5–7.7)
NEUTROPHILS NFR BLD AUTO: 57.1 %
PLATELET # BLD AUTO: 325 10(3)UL (ref 150–450)
RBC # BLD AUTO: 3.61 X10(6)UL
WBC # BLD AUTO: 7.9 X10(3) UL (ref 4–11)

## 2023-03-13 PROCEDURE — 85025 COMPLETE CBC W/AUTO DIFF WBC: CPT

## 2023-03-13 PROCEDURE — 96374 THER/PROPH/DIAG INJ IV PUSH: CPT

## 2023-03-13 NOTE — PROGRESS NOTES
Education Record    Learner:  Patient    Disease / Diagnosis: anemia    Barriers / Limitations:  None   Comments:    Method:  Discussion   Comments:    General Topics:  Plan of care reviewed   Comments:    Outcome:  Shows understanding   Comments:    Pt here for labs and second feraheme infusion. Pt observed for 30 mins post infusion. Tolerated well. Pt discharged in stable condition.

## 2023-10-30 ENCOUNTER — LAB ENCOUNTER (OUTPATIENT)
Dept: LAB | Age: 48
End: 2023-10-30
Attending: SPECIALIST
Payer: COMMERCIAL

## 2023-10-30 DIAGNOSIS — E55.9 VITAMIN D DEFICIENCY: ICD-10-CM

## 2023-10-30 DIAGNOSIS — D50.0 IRON DEFICIENCY ANEMIA SECONDARY TO BLOOD LOSS (CHRONIC): ICD-10-CM

## 2023-10-30 LAB
BASOPHILS # BLD AUTO: 0.03 X10(3) UL (ref 0–0.2)
BASOPHILS NFR BLD AUTO: 0.3 %
DEPRECATED HBV CORE AB SER IA-ACNC: 7.6 NG/ML
EOSINOPHIL # BLD AUTO: 0.04 X10(3) UL (ref 0–0.7)
EOSINOPHIL NFR BLD AUTO: 0.5 %
ERYTHROCYTE [DISTWIDTH] IN BLOOD BY AUTOMATED COUNT: 14.6 %
HCT VFR BLD AUTO: 35.8 %
HGB BLD-MCNC: 11.2 G/DL
IMM GRANULOCYTES # BLD AUTO: 0.02 X10(3) UL (ref 0–1)
IMM GRANULOCYTES NFR BLD: 0.2 %
IRON SATN MFR SERPL: 6 %
IRON SERPL-MCNC: 30 UG/DL
LYMPHOCYTES # BLD AUTO: 2.62 X10(3) UL (ref 1–4)
LYMPHOCYTES NFR BLD AUTO: 30.5 %
MCH RBC QN AUTO: 26.7 PG (ref 26–34)
MCHC RBC AUTO-ENTMCNC: 31.3 G/DL (ref 31–37)
MCV RBC AUTO: 85.2 FL
MONOCYTES # BLD AUTO: 0.57 X10(3) UL (ref 0.1–1)
MONOCYTES NFR BLD AUTO: 6.6 %
NEUTROPHILS # BLD AUTO: 5.3 X10 (3) UL (ref 1.5–7.7)
NEUTROPHILS # BLD AUTO: 5.3 X10(3) UL (ref 1.5–7.7)
NEUTROPHILS NFR BLD AUTO: 61.9 %
PLATELET # BLD AUTO: 368 10(3)UL (ref 150–450)
RBC # BLD AUTO: 4.2 X10(6)UL
TIBC SERPL-MCNC: 472 UG/DL (ref 240–450)
TRANSFERRIN SERPL-MCNC: 317 MG/DL (ref 200–360)
WBC # BLD AUTO: 8.6 X10(3) UL (ref 4–11)

## 2023-10-30 PROCEDURE — 85025 COMPLETE CBC W/AUTO DIFF WBC: CPT

## 2023-10-30 PROCEDURE — 83540 ASSAY OF IRON: CPT

## 2023-10-30 PROCEDURE — 83550 IRON BINDING TEST: CPT

## 2023-10-30 PROCEDURE — 36415 COLL VENOUS BLD VENIPUNCTURE: CPT

## 2023-10-30 PROCEDURE — 82728 ASSAY OF FERRITIN: CPT

## 2023-10-31 ENCOUNTER — PATIENT MESSAGE (OUTPATIENT)
Dept: HEMATOLOGY/ONCOLOGY | Facility: HOSPITAL | Age: 48
End: 2023-10-31

## 2023-10-31 RX ORDER — ERGOCALCIFEROL 1.25 MG/1
50000 CAPSULE ORAL WEEKLY
Qty: 12 CAPSULE | Refills: 1 | Status: SHIPPED | OUTPATIENT
Start: 2023-10-31

## 2023-10-31 NOTE — TELEPHONE ENCOUNTER
Please review; protocol failed. Requested Prescriptions   Pending Prescriptions Disp Refills    ergocalciferol 1.25 MG (12697 UT) Oral Cap 12 capsule 1     Sig: Take 1 capsule (50,000 Units total) by mouth once a week.        There is no refill protocol information for this order        Recent Outpatient Visits              7 months ago Iron deficiency anemia secondary to blood loss (chronic)    [address]    Office Visit    7 months ago Iron deficiency anemia secondary to blood loss (chronic)    [facility]    Office Visit    7 months ago Vitamin B12 deficiency    [facility] [place], Janet Liz MD    Office Visit    10 months ago Iron deficiency anemia secondary to blood loss (chronic)    [address]    Office Visit    10 months ago Vitamin B12 deficiency    [address]    Office Visit          Future Appointments         Provider Department Appt Notes    Tomorrow Eber Soto MD [address], [address] [facility] 3/18/2021 LAST PX -Anemia

## 2023-11-03 ENCOUNTER — OFFICE VISIT (OUTPATIENT)
Dept: HEMATOLOGY/ONCOLOGY | Facility: HOSPITAL | Age: 48
End: 2023-11-03
Attending: SPECIALIST
Payer: COMMERCIAL

## 2023-11-03 VITALS
DIASTOLIC BLOOD PRESSURE: 75 MMHG | RESPIRATION RATE: 18 BRPM | TEMPERATURE: 97 F | HEART RATE: 84 BPM | WEIGHT: 238 LBS | OXYGEN SATURATION: 100 % | BODY MASS INDEX: 38 KG/M2 | SYSTOLIC BLOOD PRESSURE: 119 MMHG

## 2023-11-03 DIAGNOSIS — D50.0 IRON DEFICIENCY ANEMIA SECONDARY TO BLOOD LOSS (CHRONIC): Primary | ICD-10-CM

## 2023-11-03 DIAGNOSIS — D50.0 IRON DEFICIENCY ANEMIA DUE TO CHRONIC BLOOD LOSS: Primary | ICD-10-CM

## 2023-11-03 PROCEDURE — 96365 THER/PROPH/DIAG IV INF INIT: CPT

## 2023-11-03 NOTE — PROGRESS NOTES
Patient is here today for follow up with patient  for Iron deficiency Anemia. Patient denies pain. Stated fatigued at times. Still having heavy periods. IUD came out a few days after insertion (with heavy menstrual bleeding). Stated bleeding has improved since off her blood thinners. Medication list and medical history reviewed and updated. Education Record    Learner:  Patient      Disease / Diagnosis: Iron Deficiency Anemia    Barriers / Limitations:  None   Comments:    Method:  Brief focused, Discussion, Printed material and Reinforcement   Comments:    General Topics:  Medication, Pain, Procedure and Plan of care reviewed   Comments:    Outcome:  Shows understanding   Comments:    Rachell Fox MD visit. Patient sent to waiting room. Treating RN notified. AVS provided and follow up reviewed. Patient instructed to call as needed.

## 2023-11-03 NOTE — PROGRESS NOTES
Pt here for Feraheme . Pt denies any issues or concerns. Ordering MD: dr Kalyn Qureshi Exp: ongoing     Pt tolerated infusion without difficulty or complaint.  Reviewed next apt date/time: 11-10-23      Education Record  Learner:  Patient  Disease / Diagnosis: CELINE  Barriers / Limitations:  None  Method:  Brief focused  General Topics:  Plan of care reviewed  Outcome:  Shows understanding

## 2023-11-10 ENCOUNTER — OFFICE VISIT (OUTPATIENT)
Dept: HEMATOLOGY/ONCOLOGY | Facility: HOSPITAL | Age: 48
End: 2023-11-10
Attending: SPECIALIST
Payer: COMMERCIAL

## 2023-11-10 VITALS
RESPIRATION RATE: 20 BRPM | TEMPERATURE: 99 F | SYSTOLIC BLOOD PRESSURE: 139 MMHG | DIASTOLIC BLOOD PRESSURE: 85 MMHG | OXYGEN SATURATION: 99 % | HEART RATE: 87 BPM

## 2023-11-10 DIAGNOSIS — D50.0 IRON DEFICIENCY ANEMIA SECONDARY TO BLOOD LOSS (CHRONIC): Primary | ICD-10-CM

## 2023-11-10 PROCEDURE — 96374 THER/PROPH/DIAG INJ IV PUSH: CPT

## 2023-11-10 NOTE — PROGRESS NOTES
Pt here for iron infusion. Arrives Ambulating independently, accompanied by Other self           Modifications in dose or schedule: No     Frequency of blood return and site check throughout administration: Prior to administration   Discharged to Home, Ambulating independently, accompanied by: Other self    Outpatient Oncology Care Plan  Problem list:  anemia  Problems related to:    disease/disease progression  Interventions:  administered iron  Expected outcomes:  optimal lab values  Progress towards outcome:  making progress    Education Record    Learner:  Patient  Barriers / Limitations:  None  Method:  Brief focused  Outcome:  Shows understanding  Comments:observed pt 30 min post infusion. Pt tolerated infusion. No c/o. VSS. See flowsheet Pt Tolerated feraheme without incident. Labs in 3 months per MD orders. Pt aware of early February timing for labs. Left in stable condition after 30 mins of monitoring.

## 2023-12-06 ENCOUNTER — OFFICE VISIT (OUTPATIENT)
Dept: FAMILY MEDICINE CLINIC | Facility: CLINIC | Age: 48
End: 2023-12-06

## 2023-12-06 ENCOUNTER — TELEPHONE (OUTPATIENT)
Dept: FAMILY MEDICINE CLINIC | Facility: CLINIC | Age: 48
End: 2023-12-06

## 2023-12-06 ENCOUNTER — LAB ENCOUNTER (OUTPATIENT)
Dept: LAB | Age: 48
End: 2023-12-06
Attending: STUDENT IN AN ORGANIZED HEALTH CARE EDUCATION/TRAINING PROGRAM
Payer: COMMERCIAL

## 2023-12-06 VITALS
BODY MASS INDEX: 38.12 KG/M2 | TEMPERATURE: 98 F | SYSTOLIC BLOOD PRESSURE: 102 MMHG | DIASTOLIC BLOOD PRESSURE: 66 MMHG | WEIGHT: 237.19 LBS | HEART RATE: 76 BPM | OXYGEN SATURATION: 95 % | HEIGHT: 66 IN

## 2023-12-06 DIAGNOSIS — G89.29 CHRONIC PAIN OF RIGHT KNEE: ICD-10-CM

## 2023-12-06 DIAGNOSIS — D50.0 IRON DEFICIENCY ANEMIA SECONDARY TO BLOOD LOSS (CHRONIC): ICD-10-CM

## 2023-12-06 DIAGNOSIS — Z00.00 WELL ADULT EXAM: Primary | ICD-10-CM

## 2023-12-06 DIAGNOSIS — M25.561 CHRONIC PAIN OF RIGHT KNEE: ICD-10-CM

## 2023-12-06 DIAGNOSIS — E55.9 VITAMIN D DEFICIENCY: ICD-10-CM

## 2023-12-06 DIAGNOSIS — Z12.31 ENCOUNTER FOR SCREENING MAMMOGRAM FOR MALIGNANT NEOPLASM OF BREAST: ICD-10-CM

## 2023-12-06 DIAGNOSIS — Z00.00 WELL ADULT EXAM: ICD-10-CM

## 2023-12-06 DIAGNOSIS — N92.0 MENORRHAGIA WITH REGULAR CYCLE: ICD-10-CM

## 2023-12-06 DIAGNOSIS — Z12.11 COLON CANCER SCREENING: ICD-10-CM

## 2023-12-06 LAB
ALBUMIN SERPL-MCNC: 4.6 G/DL (ref 3.2–4.8)
ALBUMIN/GLOB SERPL: 1.4 {RATIO} (ref 1–2)
ALP LIVER SERPL-CCNC: 80 U/L
ALT SERPL-CCNC: 15 U/L
ANION GAP SERPL CALC-SCNC: 6 MMOL/L (ref 0–18)
AST SERPL-CCNC: 19 U/L (ref ?–34)
BILIRUB SERPL-MCNC: 0.4 MG/DL (ref 0.3–1.2)
BUN BLD-MCNC: 10 MG/DL (ref 9–23)
BUN/CREAT SERPL: 9.2 (ref 10–20)
CALCIUM BLD-MCNC: 9.9 MG/DL (ref 8.7–10.4)
CHLORIDE SERPL-SCNC: 106 MMOL/L (ref 98–112)
CHOLEST SERPL-MCNC: 190 MG/DL (ref ?–200)
CO2 SERPL-SCNC: 27 MMOL/L (ref 21–32)
CREAT BLD-MCNC: 1.09 MG/DL
EGFRCR SERPLBLD CKD-EPI 2021: 63 ML/MIN/1.73M2 (ref 60–?)
EST. AVERAGE GLUCOSE BLD GHB EST-MCNC: 111 MG/DL (ref 68–126)
FASTING PATIENT LIPID ANSWER: NO
FASTING STATUS PATIENT QL REPORTED: NO
GLOBULIN PLAS-MCNC: 3.2 G/DL (ref 2.8–4.4)
GLUCOSE BLD-MCNC: 88 MG/DL (ref 70–99)
HBA1C MFR BLD: 5.5 % (ref ?–5.7)
HDLC SERPL-MCNC: 74 MG/DL (ref 40–59)
LDLC SERPL CALC-MCNC: 101 MG/DL (ref ?–100)
NONHDLC SERPL-MCNC: 116 MG/DL (ref ?–130)
OSMOLALITY SERPL CALC.SUM OF ELEC: 286 MOSM/KG (ref 275–295)
POTASSIUM SERPL-SCNC: 3.9 MMOL/L (ref 3.5–5.1)
PROT SERPL-MCNC: 7.8 G/DL (ref 5.7–8.2)
SODIUM SERPL-SCNC: 139 MMOL/L (ref 136–145)
TRIGL SERPL-MCNC: 82 MG/DL (ref 30–149)
TSI SER-ACNC: 1.48 MIU/ML (ref 0.55–4.78)
VIT B12 SERPL-MCNC: 282 PG/ML (ref 211–911)
VIT D+METAB SERPL-MCNC: 14.6 NG/ML (ref 30–100)
VLDLC SERPL CALC-MCNC: 14 MG/DL (ref 0–30)

## 2023-12-06 PROCEDURE — 3078F DIAST BP <80 MM HG: CPT | Performed by: STUDENT IN AN ORGANIZED HEALTH CARE EDUCATION/TRAINING PROGRAM

## 2023-12-06 PROCEDURE — 80053 COMPREHEN METABOLIC PANEL: CPT

## 2023-12-06 PROCEDURE — 3074F SYST BP LT 130 MM HG: CPT | Performed by: STUDENT IN AN ORGANIZED HEALTH CARE EDUCATION/TRAINING PROGRAM

## 2023-12-06 PROCEDURE — 82306 VITAMIN D 25 HYDROXY: CPT

## 2023-12-06 PROCEDURE — 84443 ASSAY THYROID STIM HORMONE: CPT

## 2023-12-06 PROCEDURE — 99396 PREV VISIT EST AGE 40-64: CPT | Performed by: STUDENT IN AN ORGANIZED HEALTH CARE EDUCATION/TRAINING PROGRAM

## 2023-12-06 PROCEDURE — 3008F BODY MASS INDEX DOCD: CPT | Performed by: STUDENT IN AN ORGANIZED HEALTH CARE EDUCATION/TRAINING PROGRAM

## 2023-12-06 PROCEDURE — 80061 LIPID PANEL: CPT

## 2023-12-06 PROCEDURE — 82607 VITAMIN B-12: CPT

## 2023-12-06 PROCEDURE — 36415 COLL VENOUS BLD VENIPUNCTURE: CPT

## 2023-12-06 PROCEDURE — 83036 HEMOGLOBIN GLYCOSYLATED A1C: CPT

## 2023-12-06 RX ORDER — ERGOCALCIFEROL 1.25 MG/1
50000 CAPSULE ORAL WEEKLY
Qty: 12 CAPSULE | Refills: 1 | Status: SHIPPED | OUTPATIENT
Start: 2023-12-06

## 2023-12-06 RX ORDER — FOLIC ACID 1 MG/1
1 TABLET ORAL DAILY
Qty: 90 TABLET | Refills: 0 | Status: SHIPPED | OUTPATIENT
Start: 2023-12-06

## 2023-12-06 RX ORDER — IBUPROFEN 800 MG/1
800 TABLET ORAL EVERY 6 HOURS PRN
Qty: 90 TABLET | Refills: 3 | Status: SHIPPED | OUTPATIENT
Start: 2023-12-06

## 2023-12-06 NOTE — TELEPHONE ENCOUNTER
Cologuard order form has been successfully faxed to [employer]. Fx ([phone]    Form has been submitted for scanning.

## 2024-01-18 ENCOUNTER — LAB ENCOUNTER (OUTPATIENT)
Dept: LAB | Age: 49
End: 2024-01-18
Attending: CLINICAL NURSE SPECIALIST
Payer: COMMERCIAL

## 2024-01-18 DIAGNOSIS — D50.0 IRON DEFICIENCY ANEMIA DUE TO CHRONIC BLOOD LOSS: ICD-10-CM

## 2024-01-18 LAB
BASOPHILS # BLD AUTO: 0.02 X10(3) UL (ref 0–0.2)
BASOPHILS NFR BLD AUTO: 0.2 %
EOSINOPHIL # BLD AUTO: 0.08 X10(3) UL (ref 0–0.7)
EOSINOPHIL NFR BLD AUTO: 0.8 %
ERYTHROCYTE [DISTWIDTH] IN BLOOD BY AUTOMATED COUNT: 15.5 %
HCT VFR BLD AUTO: 36.7 %
HGB BLD-MCNC: 12 G/DL
IMM GRANULOCYTES # BLD AUTO: 0.04 X10(3) UL (ref 0–1)
IMM GRANULOCYTES NFR BLD: 0.4 %
LYMPHOCYTES # BLD AUTO: 3.72 X10(3) UL (ref 1–4)
LYMPHOCYTES NFR BLD AUTO: 36.2 %
MCH RBC QN AUTO: 29 PG (ref 26–34)
MCHC RBC AUTO-ENTMCNC: 32.7 G/DL (ref 31–37)
MCV RBC AUTO: 88.6 FL
MONOCYTES # BLD AUTO: 0.73 X10(3) UL (ref 0.1–1)
MONOCYTES NFR BLD AUTO: 7.1 %
NEUTROPHILS # BLD AUTO: 5.68 X10 (3) UL (ref 1.5–7.7)
NEUTROPHILS # BLD AUTO: 5.68 X10(3) UL (ref 1.5–7.7)
NEUTROPHILS NFR BLD AUTO: 55.3 %
PLATELET # BLD AUTO: 336 10(3)UL (ref 150–450)
RBC # BLD AUTO: 4.14 X10(6)UL
WBC # BLD AUTO: 10.3 X10(3) UL (ref 4–11)

## 2024-01-18 PROCEDURE — 85025 COMPLETE CBC W/AUTO DIFF WBC: CPT

## 2024-01-18 PROCEDURE — 83540 ASSAY OF IRON: CPT

## 2024-01-18 PROCEDURE — 36415 COLL VENOUS BLD VENIPUNCTURE: CPT

## 2024-01-18 PROCEDURE — 82728 ASSAY OF FERRITIN: CPT

## 2024-01-18 PROCEDURE — 83550 IRON BINDING TEST: CPT

## 2024-01-19 ENCOUNTER — PATIENT MESSAGE (OUTPATIENT)
Dept: HEMATOLOGY/ONCOLOGY | Facility: HOSPITAL | Age: 49
End: 2024-01-19

## 2024-01-19 LAB
DEPRECATED HBV CORE AB SER IA-ACNC: 18.4 NG/ML
IRON SATN MFR SERPL: 13 %
IRON SERPL-MCNC: 51 UG/DL
TIBC SERPL-MCNC: 386 UG/DL (ref 240–450)
TRANSFERRIN SERPL-MCNC: 259 MG/DL (ref 200–360)

## 2024-01-22 ENCOUNTER — TELEPHONE (OUTPATIENT)
Dept: HEMATOLOGY/ONCOLOGY | Facility: HOSPITAL | Age: 49
End: 2024-01-22

## 2024-02-07 ENCOUNTER — OFFICE VISIT (OUTPATIENT)
Dept: HEMATOLOGY/ONCOLOGY | Facility: HOSPITAL | Age: 49
End: 2024-02-07
Attending: SPECIALIST
Payer: COMMERCIAL

## 2024-02-07 VITALS
DIASTOLIC BLOOD PRESSURE: 56 MMHG | SYSTOLIC BLOOD PRESSURE: 111 MMHG | HEART RATE: 78 BPM | RESPIRATION RATE: 16 BRPM | OXYGEN SATURATION: 96 %

## 2024-02-07 VITALS
BODY MASS INDEX: 38.7 KG/M2 | HEART RATE: 87 BPM | TEMPERATURE: 97 F | DIASTOLIC BLOOD PRESSURE: 75 MMHG | HEIGHT: 65.98 IN | WEIGHT: 240.81 LBS | OXYGEN SATURATION: 99 % | SYSTOLIC BLOOD PRESSURE: 110 MMHG

## 2024-02-07 DIAGNOSIS — D50.0 IRON DEFICIENCY ANEMIA SECONDARY TO BLOOD LOSS (CHRONIC): Primary | ICD-10-CM

## 2024-02-07 DIAGNOSIS — D50.0 IRON DEFICIENCY ANEMIA DUE TO CHRONIC BLOOD LOSS: Primary | ICD-10-CM

## 2024-02-07 PROCEDURE — 96374 THER/PROPH/DIAG INJ IV PUSH: CPT

## 2024-02-07 PROCEDURE — 99214 OFFICE O/P EST MOD 30 MIN: CPT | Performed by: NURSE PRACTITIONER

## 2024-02-07 NOTE — PROGRESS NOTES
Education Record    Learner:  Patient    Disease / Diagnosis: anemia    Barriers / Limitations:  None   Comments:    Method:  Discussion   Comments:    General Topics:  Medication, Side effects and symptom management, Plan of care reviewed, and Fall risk and prevention   Comments:    Outcome:  Shows understanding   Comments:    Pt here for feraheme iron infusion. Tolerated well. Second dose scheduled for next Tuesday due to pt's availability. Discharged in stable condition upon completion of infusion.    Confirmed with Ebony in billing dept that her insurance does not require PA to administer iron today.

## 2024-02-07 NOTE — PROGRESS NOTES
Chief Complaint   Patient presents with    Follow - Up    Infusion     Pt is here for iron infusion; she noted increased headaches and fatigue. History of heavy menstrual cycles; denies blood in stool, N,V.     Education Record    Learner:  Patient    Disease / Diagnosis: CELINE    Barriers / Limitations:  None   Comments:    Method:  Brief focused   Comments:    General Topics:  Diet, Medication, Side effects and symptom management, and Plan of care reviewed   Comments:    Outcome:  Shows understanding   Comments:

## 2024-02-07 NOTE — PROGRESS NOTES
Cancer Center Progress Note    Patient Name: Carroll Best   YOB: 1975   Medical Record Number: [id number]   [facility]: [id number]   Date of visit: 2/7/2024     Chief Complaint/Reason for Visit:  Chief Complaint   Patient presents with    Follow - Up    Infusion      History of Present Illness: Carroll presents today for follow up of iron deficiency anemia. Her hematologist is Dr. Bradley Denson, additional hematology history below. Patient last received IV iron ferumoxytol in 11/2023, she reports improvement in symptoms related to iron deficiency after receiving. She currently reports fatigue and intermittent headache. She denies shortness of breath or dyspnea, dizziness or lightheadedness. She continues to experience heavy menses, currently has had 7 days of heavy menstrual bleeding that has soaked through pants. She is following with OBGYN and reviewing options for heavy menses including hysterectomy.     Hematology History:  Presented to the ED on 10/15/2022 with chest pain and dyspnea with exertion. Laboratory studies showed a Hb of 6.0 g/dl with low MCV, MCH, and MCHC. Patient also complained of right leg pain and swelling. D dimer was elevated resulting in CTA chest and right lower extremity Doppler ultrasound which showed bilateral lobar, segmental, and subsegmental pulmonary emboli and DVT from the right popliteal to distal posterior tibial veins.     Patient travelled by plane from [facility] on Thursday 10/13/2022. She noticed on the plane that her right leg was swollen below the knee and the pain in that leg was different than her usual right knee pain, though that had worsened over the last 4-5 weeks. Once she got off the plane, she had more dyspnea than she had before getting on the plane.      Patient was transfused PRBC and started on IV heparin. Upon discharge she was switched to apixaban. Laboratory workup showed iron deficiency anemia and she received IV iron sucrose.      She denies any  previous history of VTE. She has no personal or family history of thrombophilia. No known family history of early onset cardiovascular or cerebrovascular disease or miscarriages.      She reports chronic heavy menstrual periods and has uterine fibroids. During the admission, she was see by gynecology and started on medroxyprogesterone and advised to pursue hysterectomy.      A review of laboratory studies available in the electronic medical record system shows anemia dating back to at 11/2017; before that her hemoglobin was at the low end of normal. Red cell indices have been low since at least 08/2020. She states that she has tried oral iron but it causes excessive GI adverse effects.      Noted on her CTA chest is a round masslike area in the left breast measuring 1.6 x 1.8 cm with some areas of hyperdensity. The patient's last mammogram in the system is a screening mammogram from 02/2016 at which time there was noted a lobulated mass or masses in the left breast measuring 3.2 x 2 cm. Ultrasound confirmed the abnormality. 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 Repeat diagnostic mammogram in 11/2022 showed stable findings.      As an outpatient, she received the test dose for iron dextran and had a reaction so instead received ferumoxytol 510 mg on 10/25/2022 and 11/02/2022.      Repeat right lower extremity Doppler ultrasound on 02/15/2023 showed resolution of the DVT. She remained on apixaban until medroxyprogesterone was discontinued in 03/2023.      Laboratory studies on 03/02/2023 showed iron deficiency anemia. Folic acid and vitamin B12 levels were within normal limits.       Problem List:  Patient Active Problem List   Diagnosis    Routine physical examination    Knee pain, right anterior    Encounter for routine gynecological examination    Cervicalgia    Vertigo    Carpal tunnel syndrome    Vitamin D deficiency    Cough     Suspected COVID-19 virus infection    Obesity (BMI 35.0-39.9 without comorbidity)    Primary osteoarthritis of knees, bilateral    Chondromalacia, patella, right    Hypokalemia    Symptomatic anemia    Menorrhagia with regular cycle    Uterine leiomyoma, unspecified location    Exertional chest pain    Abnormal EKG    Multiple subsegmental pulmonary emboli without acute cor pulmonale (HCC)    Mass of left breast, unspecified quadrant    Leg DVT (deep venous thromboembolism), acute, right (HCC)    Iron deficiency anemia secondary to blood loss (chronic)        Medical History:  Past Medical History:   Diagnosis Date    Lipid screening 2011       Surgical History:  Past Surgical History:   Procedure Laterality Date    NEEDLE BIOPSY LEFT      benign          x2    TREAT ECTOPIC PREG,NON REMVAL  2006       Allergies:  No Known Allergies    Family History:  Family History   Problem Relation Age of Onset    Arthritis Father         osteoarthritis       Social History:  Social History     Socioeconomic History    Marital status:      Spouse name: Not on file    Number of children: Not on file    Years of education: Not on file    Highest education level: Not on file   Occupational History    Not on file   Tobacco Use    Smoking status: Never    Smokeless tobacco: Never   Vaping Use    Vaping Use: Never used   Substance and Sexual Activity    Alcohol use: No     Alcohol/week: 0.0 standard drinks of alcohol    Drug use: No    Sexual activity: Not on file   Other Topics Concern     Service Not Asked    Blood Transfusions Not Asked    Caffeine Concern No    Occupational Exposure Not Asked    Hobby Hazards Not Asked    Sleep Concern Not Asked    Stress Concern Not Asked    Weight Concern Not Asked    Special Diet Not Asked    Back Care Not Asked    Exercise Not Asked    Bike Helmet Not Asked    Seat Belt Not Asked    Self-Exams Not Asked   Social History Narrative    Not on file     Social  Determinants of Health     Financial Resource Strain: Not on file   Food Insecurity: Not on file   Transportation Needs: Not on file   Physical Activity: Not on file   Stress: Not on file   Social Connections: Not on file   Housing Stability: Not on file       Medications:    Current Outpatient Medications:     folic acid 1 MG Oral Tab, Take 1 tablet (1 mg total) by mouth daily., Disp: 90 tablet, Rfl: 0    ergocalciferol 1.25 MG (63975 UT) Oral Cap, Take 1 capsule (50,000 Units total) by mouth once a week., Disp: 12 capsule, Rfl: 1    ibuprofen 800 MG Oral Tab, Take 1 tablet (800 mg total) by mouth every 6 (six) hours as needed for Pain., Disp: 90 tablet, Rfl: 3    Review of Systems:  A comprehensive 14 point review of systems was completed.  Pertinent positives and negatives noted in the HPI.    Physical Examination:  General: Patient is alert and oriented x 3, not in acute distress.  Vital Signs: Height: 167.6 cm (5' 5.98\") (02/07 1304)  Weight: 109.2 kg (240 lb 12.8 oz) (02/07 1304)  BSA (Calculated - sq m): 2.16 sq meters (02/07 1304)  Pulse: 87 (02/07 1304)  BP: 110/75 (02/07 1304)  Temp: 97.2 °F (36.2 °C) (02/07 1304)  Do Not Use - Resp Rate: --  SpO2: 99 % (02/07 1304)  HEENT: Anicteric, conjunctivae and sclerae clear, mucous membranes are moist   Chest: Clear to auscultation. Respirations unlabored.   Heart: Regular rate and rhythm.   Abdomen: Soft, non-distended, non-tender with present bowel sounds.  Extremities: No edema.  Neurological: Grossly intact.   Skin: warm, dry, no erythema or rash   Psych/Depression: mood and affect are appropriate.     Labs:   Latest Reference Range & Units 01/18/24 14:52   Iron, Serum 50 - 170 ug/dL 51   Transferrin 200 - 360 mg/dL 259   Iron Bind.Cap.(TIBC) 240 - 450 ug/dL 386   Iron Saturation 15 - 50 % 13 (L)   FERRITIN 12.0 - 240.0 ng/mL 18.4   WBC 4.0 - 11.0 x10(3) uL 10.3   Hemoglobin 12.0 - 16.0 g/dL 12.0   Hematocrit 35.0 - 48.0 % 36.7   Platelet Count 150.0 - 450.0  10(3)uL 336.0   RBC 3.80 - 5.30 x10(6)uL 4.14   MCH 26.0 - 34.0 pg 29.0   MCHC 31.0 - 37.0 g/dL 32.7   MCV 80.0 - 100.0 fL 88.6   RDW % 15.5   Prelim Neutrophil Abs 1.50 - 7.70 x10 (3) uL 5.68   Neutrophils Absolute 1.50 - 7.70 x10(3) uL 5.68   Lymphocytes Absolute 1.00 - 4.00 x10(3) uL 3.72   Monocytes Absolute 0.10 - 1.00 x10(3) uL 0.73   Eosinophils Absolute 0.00 - 0.70 x10(3) uL 0.08   Basophils Absolute 0.00 - 0.20 x10(3) uL 0.02   Immature Granulocyte Absolute 0.00 - 1.00 x10(3) uL 0.04   Neutrophils % % 55.3   Lymphocytes % % 36.2   Monocytes % % 7.1   Eosinophils % % 0.8   Basophils % % 0.2   Immature Granulocyte % % 0.4   (L): Data is abnormally low    Impression/Plan    Iron deficiency anemia: recent iron panel consistent with iron deficiency anemia. Likely related to heavy menses. Recommend colonoscopy and EGD due to history of iron deficiency and b12 deficiency. Patient reports she is considering, although will be performing a FIT test first. Will give IV ferumoxytol    Planned Follow Up: 1 week second IV ferumoxytol; 3 months repeat iron panel and CBC     The 21st Century Cures Act makes medical notes like these available to patients in the interest of transparency. Please be advised this is a medical document. Medical documents are intended to carry relevant information, facts as evident, and the clinical opinion of the practitioner. The medical note is intended as peer to peer communication and may appear blunt or direct. It is written in medical language and may contain abbreviations or verbiage that are unfamiliar.     Electronically Signed by:    CRISTIN Carter, NP-C  Nurse Practitioner  [facility]

## 2024-02-13 ENCOUNTER — OFFICE VISIT (OUTPATIENT)
Dept: HEMATOLOGY/ONCOLOGY | Facility: HOSPITAL | Age: 49
End: 2024-02-13
Attending: SPECIALIST
Payer: COMMERCIAL

## 2024-02-13 VITALS
DIASTOLIC BLOOD PRESSURE: 74 MMHG | OXYGEN SATURATION: 100 % | HEART RATE: 91 BPM | SYSTOLIC BLOOD PRESSURE: 110 MMHG | RESPIRATION RATE: 16 BRPM | TEMPERATURE: 97 F

## 2024-02-13 DIAGNOSIS — D50.0 IRON DEFICIENCY ANEMIA SECONDARY TO BLOOD LOSS (CHRONIC): Primary | ICD-10-CM

## 2024-02-13 PROCEDURE — 96374 THER/PROPH/DIAG INJ IV PUSH: CPT

## 2024-02-13 NOTE — PROGRESS NOTES
Education Record    Learner:  Patient    Disease / Diagnosis: CELINE, here for feraheme    Barriers / Limitations:  None   Comments:    Method:  Brief focused and Discussion   Comments:    General Topics:  Medication, Side effects and symptom management, and Plan of care reviewed   Comments:    Outcome:  Shows understanding   Comments:    Feraheme infused without any complications. Vital signs taken at the end of the infusion. Patient denied any complaints/issues. Per Autumn's note 2/7, labs in 3 months. Pt states she will go to an [facility] close to her for labs in May. Lab orders in place. Discharged in good condition. Advised to call for any questions/concerns/issues.

## 2024-02-26 ENCOUNTER — APPOINTMENT (OUTPATIENT)
Dept: HEMATOLOGY/ONCOLOGY | Facility: HOSPITAL | Age: 49
End: 2024-02-26
Attending: SPECIALIST
Payer: COMMERCIAL

## 2024-05-08 ENCOUNTER — TELEPHONE (OUTPATIENT)
Dept: FAMILY MEDICINE CLINIC | Facility: CLINIC | Age: 49
End: 2024-05-08

## 2024-05-08 NOTE — TELEPHONE ENCOUNTER
Patient requesting physical form to be completed  to provide to her place of employment. Per her employment it must state the following. Patient's last physical 12/6/23. Please upload letter to my Chart once complete.      Fit to work  Good health  No physical limitations to preform job duties.  No communicable diseases      Patient requesting phone call when it is completed.

## 2024-05-09 ENCOUNTER — PATIENT MESSAGE (OUTPATIENT)
Dept: FAMILY MEDICINE CLINIC | Facility: CLINIC | Age: 49
End: 2024-05-09

## 2024-05-09 DIAGNOSIS — Z12.11 SCREEN FOR COLON CANCER: ICD-10-CM

## 2024-05-09 DIAGNOSIS — Z12.31 ENCOUNTER FOR SCREENING MAMMOGRAM FOR MALIGNANT NEOPLASM OF BREAST: Primary | ICD-10-CM

## 2024-05-09 NOTE — TELEPHONE ENCOUNTER
Pt had a physical on 12/12/23.    Would like a letter stating she had a physical.    Please advise.

## 2024-05-09 NOTE — TELEPHONE ENCOUNTER
Patient calling back in regards to status of letter.   Advised message previously routed to provider.   Patient verbalized understanding.

## 2024-05-09 NOTE — TELEPHONE ENCOUNTER
From: Carroll Best  To: Ann Lacy  Sent: 5/9/2024 10:46 AM CDT  Subject: Physical     Hello  I requested a letter documenting that I had a physical done in December 2023. I was just following up to see if that would be possible.

## 2024-05-11 ENCOUNTER — OFFICE VISIT (OUTPATIENT)
Dept: OCCUPATIONAL MEDICINE | Age: 49
End: 2024-05-11
Attending: PHYSICIAN ASSISTANT

## 2024-06-22 ENCOUNTER — LAB ENCOUNTER (OUTPATIENT)
Dept: LAB | Facility: HOSPITAL | Age: 49
End: 2024-06-22
Attending: NURSE PRACTITIONER

## 2024-06-22 DIAGNOSIS — D50.0 IRON DEFICIENCY ANEMIA DUE TO CHRONIC BLOOD LOSS: ICD-10-CM

## 2024-06-22 LAB
BASOPHILS # BLD AUTO: 0.02 X10(3) UL (ref 0–0.2)
BASOPHILS NFR BLD AUTO: 0.3 %
DEPRECATED HBV CORE AB SER IA-ACNC: 4.8 NG/ML
EOSINOPHIL # BLD AUTO: 0.05 X10(3) UL (ref 0–0.7)
EOSINOPHIL NFR BLD AUTO: 0.6 %
ERYTHROCYTE [DISTWIDTH] IN BLOOD BY AUTOMATED COUNT: 13.5 %
HCT VFR BLD AUTO: 31.1 %
HGB BLD-MCNC: 10 G/DL
IMM GRANULOCYTES # BLD AUTO: 0.03 X10(3) UL (ref 0–1)
IMM GRANULOCYTES NFR BLD: 0.4 %
IRON SATN MFR SERPL: 7 %
IRON SERPL-MCNC: 29 UG/DL
LYMPHOCYTES # BLD AUTO: 2.69 X10(3) UL (ref 1–4)
LYMPHOCYTES NFR BLD AUTO: 34.5 %
MCH RBC QN AUTO: 26.7 PG (ref 26–34)
MCHC RBC AUTO-ENTMCNC: 32.2 G/DL (ref 31–37)
MCV RBC AUTO: 83.2 FL
MONOCYTES # BLD AUTO: 0.63 X10(3) UL (ref 0.1–1)
MONOCYTES NFR BLD AUTO: 8.1 %
NEUTROPHILS # BLD AUTO: 4.37 X10 (3) UL (ref 1.5–7.7)
NEUTROPHILS # BLD AUTO: 4.37 X10(3) UL (ref 1.5–7.7)
NEUTROPHILS NFR BLD AUTO: 56.1 %
PLATELET # BLD AUTO: 331 10(3)UL (ref 150–450)
RBC # BLD AUTO: 3.74 X10(6)UL
TIBC SERPL-MCNC: 428 UG/DL (ref 240–450)
TRANSFERRIN SERPL-MCNC: 287 MG/DL (ref 200–360)
WBC # BLD AUTO: 7.8 X10(3) UL (ref 4–11)

## 2024-06-22 PROCEDURE — 36415 COLL VENOUS BLD VENIPUNCTURE: CPT

## 2024-06-22 PROCEDURE — 83550 IRON BINDING TEST: CPT

## 2024-06-22 PROCEDURE — 83540 ASSAY OF IRON: CPT

## 2024-06-22 PROCEDURE — 82728 ASSAY OF FERRITIN: CPT

## 2024-06-22 PROCEDURE — 85025 COMPLETE CBC W/AUTO DIFF WBC: CPT

## 2024-07-02 ENCOUNTER — OFFICE VISIT (OUTPATIENT)
Dept: HEMATOLOGY/ONCOLOGY | Facility: HOSPITAL | Age: 49
End: 2024-07-02
Attending: SPECIALIST
Payer: COMMERCIAL

## 2024-07-02 ENCOUNTER — HOSPITAL ENCOUNTER (EMERGENCY)
Facility: HOSPITAL | Age: 49
Discharge: HOME | End: 2024-07-03
Attending: EMERGENCY MEDICINE
Payer: COMMERCIAL

## 2024-07-02 VITALS
BODY MASS INDEX: 39.41 KG/M2 | TEMPERATURE: 97 F | RESPIRATION RATE: 18 BRPM | HEART RATE: 66 BPM | OXYGEN SATURATION: 98 % | WEIGHT: 245.19 LBS | SYSTOLIC BLOOD PRESSURE: 109 MMHG | HEIGHT: 65.98 IN | DIASTOLIC BLOOD PRESSURE: 68 MMHG

## 2024-07-02 VITALS
HEIGHT: 66 IN | HEART RATE: 82 BPM | OXYGEN SATURATION: 98 % | DIASTOLIC BLOOD PRESSURE: 85 MMHG | BODY MASS INDEX: 36.96 KG/M2 | TEMPERATURE: 97 F | RESPIRATION RATE: 20 BRPM | WEIGHT: 230 LBS | SYSTOLIC BLOOD PRESSURE: 137 MMHG

## 2024-07-02 DIAGNOSIS — K80.20 GALLSTONES: ICD-10-CM

## 2024-07-02 DIAGNOSIS — D50.0 IRON DEFICIENCY ANEMIA SECONDARY TO BLOOD LOSS (CHRONIC): Primary | ICD-10-CM

## 2024-07-02 DIAGNOSIS — K80.50 BILIARY COLIC: Primary | ICD-10-CM

## 2024-07-02 LAB
BASOPHILS # BLD AUTO: 0.03 X10(3) UL (ref 0–0.2)
BASOPHILS NFR BLD AUTO: 0.3 %
EOSINOPHIL # BLD AUTO: 0.02 X10(3) UL (ref 0–0.7)
EOSINOPHIL NFR BLD AUTO: 0.2 %
ERYTHROCYTE [DISTWIDTH] IN BLOOD BY AUTOMATED COUNT: 15.8 %
HCT VFR BLD AUTO: 30.2 %
HGB BLD-MCNC: 10.1 G/DL
IMM GRANULOCYTES # BLD AUTO: 0.04 X10(3) UL (ref 0–1)
IMM GRANULOCYTES NFR BLD: 0.4 %
LYMPHOCYTES # BLD AUTO: 1.74 X10(3) UL (ref 1–4)
LYMPHOCYTES NFR BLD AUTO: 17.8 %
MCH RBC QN AUTO: 27.6 PG (ref 26–34)
MCHC RBC AUTO-ENTMCNC: 33.4 G/DL (ref 31–37)
MCV RBC AUTO: 82.5 FL
MONOCYTES # BLD AUTO: 0.5 X10(3) UL (ref 0.1–1)
MONOCYTES NFR BLD AUTO: 5.1 %
NEUTROPHILS # BLD AUTO: 7.47 X10 (3) UL (ref 1.5–7.7)
NEUTROPHILS # BLD AUTO: 7.47 X10(3) UL (ref 1.5–7.7)
NEUTROPHILS NFR BLD AUTO: 76.2 %
PLATELET # BLD AUTO: 301 10(3)UL (ref 150–450)
RBC # BLD AUTO: 3.66 X10(6)UL
WBC # BLD AUTO: 9.8 X10(3) UL (ref 4–11)

## 2024-07-02 PROCEDURE — 96374 THER/PROPH/DIAG INJ IV PUSH: CPT

## 2024-07-02 PROCEDURE — 80053 COMPREHEN METABOLIC PANEL: CPT | Performed by: EMERGENCY MEDICINE

## 2024-07-02 PROCEDURE — 93010 ELECTROCARDIOGRAM REPORT: CPT

## 2024-07-02 PROCEDURE — 93005 ELECTROCARDIOGRAM TRACING: CPT

## 2024-07-02 PROCEDURE — 83690 ASSAY OF LIPASE: CPT | Performed by: EMERGENCY MEDICINE

## 2024-07-02 PROCEDURE — 85025 COMPLETE CBC W/AUTO DIFF WBC: CPT | Performed by: EMERGENCY MEDICINE

## 2024-07-02 PROCEDURE — 99285 EMERGENCY DEPT VISIT HI MDM: CPT

## 2024-07-02 RX ORDER — ONDANSETRON 2 MG/ML
4 INJECTION INTRAMUSCULAR; INTRAVENOUS ONCE
Status: COMPLETED | OUTPATIENT
Start: 2024-07-02 | End: 2024-07-02

## 2024-07-02 NOTE — PROGRESS NOTES
Pt here for Feraheme . Pt denies any issues or concerns.      Ordering Provider: dr leon  Order Exp: ongoing     Pt tolerated infusion without difficulty or complaint. Reviewed next apt date/time: yes      Education Record  Learner:  Patient  Disease / Diagnosis: CELINE  Barriers / Limitations:  None  Method:  Brief focused  General Topics:  Plan of care reviewed  Outcome:  Shows understanding

## 2024-07-03 ENCOUNTER — APPOINTMENT (OUTPATIENT)
Dept: CT IMAGING | Facility: HOSPITAL | Age: 49
End: 2024-07-03
Attending: EMERGENCY MEDICINE
Payer: COMMERCIAL

## 2024-07-03 LAB
ALBUMIN SERPL-MCNC: 3.7 G/DL (ref 3.4–5)
ALBUMIN/GLOB SERPL: 0.9 {RATIO} (ref 1–2)
ALP LIVER SERPL-CCNC: 80 U/L
ALT SERPL-CCNC: 55 U/L
ANION GAP SERPL CALC-SCNC: 7 MMOL/L (ref 0–18)
AST SERPL-CCNC: 124 U/L (ref 15–37)
ATRIAL RATE: 76 BPM
B-HCG UR QL: NEGATIVE
BILIRUB SERPL-MCNC: 0.2 MG/DL (ref 0.1–2)
BILIRUB UR QL STRIP.AUTO: NEGATIVE
BUN BLD-MCNC: 15 MG/DL (ref 9–23)
CALCIUM BLD-MCNC: 9.3 MG/DL (ref 8.5–10.1)
CHLORIDE SERPL-SCNC: 109 MMOL/L (ref 98–112)
CLARITY UR REFRACT.AUTO: CLEAR
CO2 SERPL-SCNC: 24 MMOL/L (ref 21–32)
CREAT BLD-MCNC: 1.18 MG/DL
EGFRCR SERPLBLD CKD-EPI 2021: 58 ML/MIN/1.73M2 (ref 60–?)
GLOBULIN PLAS-MCNC: 4.3 G/DL (ref 2.8–4.4)
GLUCOSE BLD-MCNC: 132 MG/DL (ref 70–99)
GLUCOSE UR STRIP.AUTO-MCNC: NORMAL MG/DL
KETONES UR STRIP.AUTO-MCNC: NEGATIVE MG/DL
LEUKOCYTE ESTERASE UR QL STRIP.AUTO: NEGATIVE
LIPASE SERPL-CCNC: 71 U/L (ref 13–75)
NITRITE UR QL STRIP.AUTO: NEGATIVE
OSMOLALITY SERPL CALC.SUM OF ELEC: 293 MOSM/KG (ref 275–295)
P AXIS: 53 DEGREES
P-R INTERVAL: 204 MS
PH UR STRIP.AUTO: 6.5 [PH] (ref 5–8)
POTASSIUM SERPL-SCNC: 3.7 MMOL/L (ref 3.5–5.1)
PROT SERPL-MCNC: 8 G/DL (ref 6.4–8.2)
PROT UR STRIP.AUTO-MCNC: NEGATIVE MG/DL
Q-T INTERVAL: 380 MS
QRS DURATION: 78 MS
QTC CALCULATION (BEZET): 427 MS
R AXIS: 1 DEGREES
RBC UR QL AUTO: NEGATIVE
SARS-COV-2 RNA RESP QL NAA+PROBE: NOT DETECTED
SODIUM SERPL-SCNC: 140 MMOL/L (ref 136–145)
SP GR UR STRIP.AUTO: 1.02 (ref 1–1.03)
T AXIS: 5 DEGREES
UROBILINOGEN UR STRIP.AUTO-MCNC: NORMAL MG/DL
VENTRICULAR RATE: 76 BPM

## 2024-07-03 PROCEDURE — 81003 URINALYSIS AUTO W/O SCOPE: CPT | Performed by: EMERGENCY MEDICINE

## 2024-07-03 PROCEDURE — 96375 TX/PRO/DX INJ NEW DRUG ADDON: CPT

## 2024-07-03 PROCEDURE — 74174 CTA ABD&PLVS W/CONTRAST: CPT | Performed by: EMERGENCY MEDICINE

## 2024-07-03 PROCEDURE — 96361 HYDRATE IV INFUSION ADD-ON: CPT

## 2024-07-03 PROCEDURE — 81025 URINE PREGNANCY TEST: CPT

## 2024-07-03 RX ORDER — DIPHENHYDRAMINE HYDROCHLORIDE 50 MG/ML
25 INJECTION INTRAMUSCULAR; INTRAVENOUS ONCE
Status: COMPLETED | OUTPATIENT
Start: 2024-07-03 | End: 2024-07-03

## 2024-07-03 RX ORDER — METOCLOPRAMIDE HYDROCHLORIDE 5 MG/ML
10 INJECTION INTRAMUSCULAR; INTRAVENOUS ONCE
Status: COMPLETED | OUTPATIENT
Start: 2024-07-03 | End: 2024-07-03

## 2024-07-03 RX ORDER — HYDROMORPHONE HYDROCHLORIDE 1 MG/ML
0.5 INJECTION, SOLUTION INTRAMUSCULAR; INTRAVENOUS; SUBCUTANEOUS ONCE
Status: COMPLETED | OUTPATIENT
Start: 2024-07-03 | End: 2024-07-03

## 2024-07-03 RX ORDER — ONDANSETRON 4 MG/1
4 TABLET, ORALLY DISINTEGRATING ORAL EVERY 4 HOURS PRN
Qty: 10 TABLET | Refills: 0 | Status: SHIPPED | OUTPATIENT
Start: 2024-07-03 | End: 2024-07-10

## 2024-07-03 NOTE — DISCHARGE INSTRUCTIONS
And diet and clear liquid diet today and then followed by bland diet call general surgery for follow-up

## 2024-07-03 NOTE — ED QUICK NOTES
Rounding Completed    Plan of Care reviewed. Waiting for Labs and MD [place].  Elimination needs assessed.  Provided Comfort measures.    Bed is locked and in lowest position. Call light within reach.  
[place]

## 2024-07-03 NOTE — ED INITIAL ASSESSMENT (HPI)
Iron infusion today and since 1800 she has been vomiting. Having muscle cramps and epigastric pain

## 2024-07-03 NOTE — ED PROVIDER NOTES
Patient Seen in: [facility] Emergency Department      History     Chief Complaint   Patient presents with    Abdomen/Flank Pain    Nausea/Vomiting/Diarrhea     Stated Complaint: N/V; Epigastric Pain. Pt had Ferritin Infusion today.    Subjective:   HPI    Iron infusion this morning  Tonight with epigastric pain at 6 vpm   Does have history of uterine fibroids but this pain is in her epigastrium radiating into her back she states that it feels like a tearing sensation.  She has not had any difficulty urinating she has not been ill recently.  She does have a history of bilateral PEs and severe anemia.  She has had reactions to other branded ferritin infusions in the past but has been on this type of ferritin infusion for the past 3 months.  She is not currently on blood thinners only took them for 6 months after diagnosis bilateral PEs back in .  States the pain is 10 of 10 sort of wiggling on the bed to find a position of comfort her  does say that when she is in pain she frequently has that sortOf movement which she normally has when she is having pain with her fibroids.  This however is higher up in her abdomen and radiating to her back she denies any history of ulcers    Objective:   Past Medical History:    Lipid screening              Past Surgical History:   Procedure Laterality Date    Needle biopsy left      benign          x2    Treat ectopic preg,non remval  2006                Social History     Socioeconomic History    Marital status:    Tobacco Use    Smoking status: Never    Smokeless tobacco: Never   Vaping Use    Vaping status: Never Used   Substance and Sexual Activity    Alcohol use: No     Alcohol/week: 0.0 standard drinks of alcohol    Drug use: No   Other Topics Concern    Caffeine Concern No              Review of Systems    Positive for stated Chief Complaint: Abdomen/Flank Pain and Nausea/Vomiting/Diarrhea    Other systems are as noted in HPI.  Constitutional  and vital signs reviewed.      All other systems reviewed and negative except as noted above.    Physical Exam     ED Triage Vitals [07/02/24 2256]   /85   Pulse 82   Resp 20   Temp 97 °F (36.1 °C)   Temp src Temporal   SpO2 98 %   O2 Device None (Room air)       Current Vitals:   Vital Signs  BP: 137/85  Pulse: 82  Resp: 20  Temp: 97 °F (36.1 °C)  Temp src: Temporal    Oxygen Therapy  SpO2: 98 %  O2 Device: None (Room air)            Physical Exam  Vitals and nursing note reviewed.   Constitutional:       General: She is not in acute distress.     Appearance: She is well-developed. She is not diaphoretic.   HENT:      Head: Atraumatic.      Right Ear: External ear normal.      Left Ear: External ear normal.      Nose: Nose normal.   Eyes:      General: No scleral icterus.        Right eye: No discharge.         Left eye: No discharge.      Conjunctiva/sclera: Conjunctivae normal.      Pupils: Pupils are equal, round, and reactive to light.   Neck:      Vascular: No JVD.   Cardiovascular:      Rate and Rhythm: Normal rate and regular rhythm.      Heart sounds: Normal heart sounds. No murmur heard.     No friction rub. No gallop.   Pulmonary:      Effort: Pulmonary effort is normal. No respiratory distress.      Breath sounds: Normal breath sounds. No stridor. No wheezing.   Chest:      Chest wall: No tenderness.   Abdominal:      General: Bowel sounds are normal. There is no distension.      Palpations: Abdomen is soft.      Tenderness: There is abdominal tenderness in the right upper quadrant and epigastric area. There is no guarding or rebound.   Musculoskeletal:         General: No tenderness or deformity. Normal range of motion.      Cervical back: Normal range of motion and neck supple.   Lymphadenopathy:      Cervical: No cervical adenopathy.   Skin:     General: Skin is warm and dry.      Coloration: Skin is not pale.      Findings: No erythema or rash.   Neurological:      Mental Status: She is alert  and oriented to person, place, and time.      Cranial Nerves: No cranial nerve deficit.      Coordination: Coordination normal.   Psychiatric:         Behavior: Behavior normal.         Judgment: Judgment normal.               ED Course     Labs Reviewed   COMP METABOLIC PANEL (14) - Abnormal; Notable for the following components:       Result Value    Glucose 132 (*)     Creatinine 1.18 (*)     eGFR-Cr 58 (*)      (*)     A/G Ratio 0.9 (*)     All other components within normal limits   CBC W/ DIFFERENTIAL - Abnormal; Notable for the following components:    RBC 3.66 (*)     HGB 10.1 (*)     HCT 30.2 (*)     All other components within normal limits   LIPASE - Normal   POCT PREGNANCY URINE - Normal   RAPID SARS-COV-2 BY PCR - Normal   CBC WITH DIFFERENTIAL WITH PLATELET    Narrative:     The following orders were created for panel order CBC With Differential With Platelet.  Procedure                               Abnormality         Status                     ---------                               -----------         ------                     CBC W/ DIFFERENTIAL[[id number]]          Abnormal            Final result                 Please view results for these tests on the individual orders.   URINALYSIS, ROUTINE   RAINBOW DRAW LAVENDER   RAINBOW DRAW LIGHT GREEN   RAINBOW DRAW BLUE     EKG    Rate, intervals and axes as noted on EKG Report.  Rate: 76  Rhythm: Sinus Rhythm  Readin bpm normal sinus rhythm no acute ST elevation or significant ST depression to suggest acute ischemia normal intervals noted.              ED Course as of 24  ------------------------------------------------------------  Time: 38  Value: Hemoglobin(!): 10.1  Comment: (Reviewed)  ------------------------------------------------------------  Time: 253  Comment: And after parenteral narcotics the patient is significantly physically improved.  She is reevaluated no longer tender to palpation in the  epigastrium or the right upper quadrant patient is sleepy from the combination of medications.  I discussed with her the Ms. likely represents biliary disease and she needs to follow-up with general surgery she expressed understanding patient is stable for discharge home no significant leukocytosis     The abdomen pelvis CTA shows no signs of dissection, or abnormality of the aorta however the gallbladder is distended without significant stranding pancreas appears normal         MDM      Pleasant 48-year-old presents to the emergency department her  with severe epigastric pain radiating to her back.  Started tonight at about 6 PM.  Cannot find a position of comfort presents to the emergency department for treatment and evaluation  Differential diagnosis includes pancreatitis, gastritis, ulcer, poor reaction to ferritin infusion, COVID-19, biliary disease, given that she feels like she has a tearing sensation into her back I do want to do a CTA to evaluate her aorta but the patient is very clinically stable with no hypotension no significant tachycardia no significant leukocytosis which is all reassuring but I really do not like the way she is describing the pain.  She however stated that she was not sure that she wanted to try anything stronger than possibly IV Tylenol for pain.          CTA of the abdomen pelvis would indicate that this is more like biliary pathology than necessarily something like a gastric ulcer or aortic pathology which is reassuring.  Patient improving.  She was very sleepy and states she had 5 of 10 pain but seemed much more comfortable after parenteral narcotics.  Patient be discharged home with her  to follow-up with general surgery.  No significant leukocytosis severe hepatic pathology or elevated lipase to suggest something like ascending cholangitis.  Patient was invited to return if pain does not improve.  I do not think that this is a reaction to ferritin infusions at this  point which was also on the differential diagnosis                     Medical Decision Making      Disposition and Plan     Clinical Impression:  1. Biliary colic    2. Gallstones         Disposition:  There is no disposition on file for this visit.  There is no disposition time on file for this visit.    Follow-up:  Kyrie Nath MD  [address] [facility] [address]  [phone]    Schedule an appointment as soon as possible for a visit            Medications Prescribed:  Current Discharge Medication List        START taking these medications    Details   ondansetron 4 MG Oral Tablet Dispersible Take 1 tablet (4 mg total) by mouth every 4 (four) hours as needed for Nausea.  Qty: 10 tablet, Refills: 0

## 2024-07-16 ENCOUNTER — OFFICE VISIT (OUTPATIENT)
Dept: HEMATOLOGY/ONCOLOGY | Facility: HOSPITAL | Age: 49
End: 2024-07-16
Attending: SPECIALIST
Payer: COMMERCIAL

## 2024-07-16 VITALS
TEMPERATURE: 98 F | OXYGEN SATURATION: 98 % | HEART RATE: 87 BPM | SYSTOLIC BLOOD PRESSURE: 134 MMHG | DIASTOLIC BLOOD PRESSURE: 82 MMHG

## 2024-07-16 DIAGNOSIS — D50.0 IRON DEFICIENCY ANEMIA SECONDARY TO BLOOD LOSS (CHRONIC): Primary | ICD-10-CM

## 2024-07-16 PROCEDURE — 96374 THER/PROPH/DIAG INJ IV PUSH: CPT

## 2024-07-16 PROCEDURE — 99214 OFFICE O/P EST MOD 30 MIN: CPT | Performed by: SPECIALIST

## 2024-07-16 PROCEDURE — G2211 COMPLEX E/M VISIT ADD ON: HCPCS | Performed by: SPECIALIST

## 2024-07-16 NOTE — PROGRESS NOTES
Patient is here for MD follow up and IV iron. Patient received her #2 Feraheme infusion today. Ongoing heavy menses. +fatigue.       Education Record    Learner:  Patient    Disease / Diagnosis:  Anemia    Barriers / Limitations:  None   Comments:    Method:  Discussion   Comments:    General Topics:  Plan of care reviewed   Comments:    Outcome:  Shows understanding   Comments:

## 2024-07-16 NOTE — PROGRESS NOTES
Education Record    Learner:  Patient    Disease / Diagnosis: pt here for [facility] appt    Barriers / Limitations:  None   Comments:    Method:  Brief focused   Comments:    General Topics:  Side effects and symptom management   Comments:    Outcome:  Shows understanding   Comments:

## 2024-07-16 NOTE — PROGRESS NOTES
[facility] Progress Note      Patient Name: Carroll Best   YOB: 1975  Medical Record Number: [id number]  Attending Physician: Bradley Denson M.D.     The 21st Century Cures Act makes medical notes like these available to patients in the interest of transparency. Please be advised this is a medical document. Medical documents are intended to carry relevant information, facts as evident, and the clinical opinion of the practitioner. The medical note is intended as peer to peer communication and may appear blunt or direct. It is written in medical language and may contain abbreviations or verbiage that are unfamiliar.     Date of Visit: 7/16/2024       Chief Complaint  Iron deficiency anemia - follow up and treatment.    History of Present Illness  Carroll Best is a 48 year old female who presented to the ED on 10/15/2022 with chest pain and dyspnea with exertion. Laboratory studies showed a Hb of 6.0 g/dl with low MCV, MCH, and MCHC. Patient also complained of right leg pain and swelling. D dimer was elevated resulting in CTA chest and right lower extremity Doppler ultrasound which showed bilateral lobar, segmental, and subsegmental pulmonary emboli and DVT from the right popliteal to distal posterior tibial veins.    Patient travelled by plane from [facility] on Thursday 10/13/2022. She noticed on the plane that her right leg was swollen below the knee and the pain in that leg was different than her usual right knee pain, though that had worsened over the last 4-5 weeks. Once she got off the plane, she had more dyspnea than she had before getting on the plane.     Patient was transfused PRBC and started on IV heparin. Upon discharge she was switched to apixaban. Laboratory workup showed iron deficiency anemia and she received IV iron sucrose.     She denies any previous history of VTE. She has no personal or family history of thrombophilia. No known family history of early  onset cardiovascular or cerebrovascular disease or miscarriages.     She reports chronic heavy menstrual periods and has uterine fibroids. During the admission, she was see by gynecology and started on medroxyprogesterone and advised to pursue hysterectomy.     A review of laboratory studies available in the electronic medical record system shows anemia dating back to at 11/2017; before that her hemoglobin was at the low end of normal. Red cell indices have been low since at least 08/2020. She states that she has tried oral iron but it causes excessive GI adverse effects.     Noted on her CTA chest is a round masslike area in the left breast measuring 1.6 x 1.8 cm with some areas of hyperdensity. The patient's last mammogram in the system is a screening mammogram from 02/2016 at which time there was noted a lobulated mass or masses in the left breast measuring 3.2 x 2 cm. Ultrasound confirmed the abnormality. 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 Patient had not undergone a repeat mammography until I ordered one in 11/2022.    As an outpatient, she received the test dose for iron dextran and had a reaction so instead received ferumoxytol 510 mg on 10/25/2022 and 11/02/2022.     Repeat right lower extremity Doppler ultrasound on 02/15/2023 showed resolution of the DVT. She remained on apixaban until medroxyprogesterone was discontinued in 03/2023.     Laboratory studies on 06/22/2024 showed iron deficiency anemia.     Patient has not yet pursued recommend endoscopic evaluation by gastroenterology.     Patient believes she will pursue hysterectomy in fall of this year for continued heavy menstrual losses.     Past Medical History (historical data, reviewed by physician)  Iron deficiency anemia; DVT and PE (as above); osteoarthritis.    Past Surgical History (historical data, reviewed by physician)  None.    Family History (historical data,  reviewed by physician)  Father gastric cancer late 50s.    Social History (historical data, reviewed by physician)  Denies tobacco use.       Current Medications  No outpatient medications have been marked as taking for the 7/16/24 encounter (Appointment) with Bradley Denson MD.     Allergies   Ms. Best has No Known Allergies.    Vital Signs   LMP 06/18/2024 (Exact Date)     Physical Examination   Constitutional Well developed and well nourished; in no apparent distress; appears close to chronological age.  Head  Normocephalic and atraumatic.  Eyes  Conjunctiva clear; sclera anicteric.  ENMT  External nose normal; external ears normal.  Respiratory Normal effort; no respiratory distress.  Neurologic Motor and sensory grossly intact.  Psychiatric Mood and affect appropriate.    Laboratory   No results found for this or any previous visit (from the past 168 hour(s)).    Impression and Plan   1.   Iron deficiency anemia: Laboratory studies are consistent with iron deficiency anemia. Patient received Ferraheme last week and today. I recommend repeating labs in approximately 2 months given her ongoing losses.     Patient will continue to receive longitudinal care by me for the complex care required for the hematologic diagnosis.    Planned Follow Up  As above.     Electronically signed by:    Bradley Denson M.D.  System Medical Director of Oncology Services  [facility]

## 2024-09-14 DIAGNOSIS — E55.9 VITAMIN D DEFICIENCY: ICD-10-CM

## 2024-09-19 RX ORDER — ERGOCALCIFEROL 1.25 MG/1
50000 CAPSULE, LIQUID FILLED ORAL WEEKLY
Qty: 12 CAPSULE | Refills: 1 | Status: SHIPPED | OUTPATIENT
Start: 2024-09-19

## 2024-09-19 NOTE — TELEPHONE ENCOUNTER
Please review. Protocol Failed; No Protocol        Component  Ref Range & Units 12/6/23 11:49 AM   Vitamin D, 25OH, Total  30.0 - 100.0 ng/mL 14.6 Low           Requested Prescriptions   Pending Prescriptions Disp Refills    ERGOCALCIFEROL 1.25 MG (27884 UT) Oral Cap [Pharmacy Med Name: VITAMIN D2 1.25MG(50,000 UNIT)] 12 capsule 1     Sig: TAKE 1 CAPSULE BY MOUTH ONE TIME PER WEEK       There is no refill protocol information for this order              Recent Outpatient Visits              2 months ago Iron deficiency anemia secondary to blood loss (chronic)    [facility] [place] Janiya, Bradley Menjivar MD    Office Visit    2 months ago Iron deficiency anemia secondary to blood loss (chronic)    [facility]    Office Visit    2 months ago Iron deficiency anemia secondary to blood loss (chronic)    [facility]    Office Visit    4 months ago     [facility]    Office Visit    7 months ago Iron deficiency anemia secondary to blood loss (chronic)    [facility]    Office Visit

## 2024-11-01 ENCOUNTER — LAB ENCOUNTER (OUTPATIENT)
Dept: LAB | Facility: HOSPITAL | Age: 49
End: 2024-11-01
Attending: SPECIALIST
Payer: COMMERCIAL

## 2024-11-01 DIAGNOSIS — D50.0 IRON DEFICIENCY ANEMIA SECONDARY TO BLOOD LOSS (CHRONIC): ICD-10-CM

## 2024-11-01 LAB
BASOPHILS # BLD AUTO: 0.02 X10(3) UL (ref 0–0.2)
BASOPHILS NFR BLD AUTO: 0.2 %
DEPRECATED HBV CORE AB SER IA-ACNC: 9 NG/ML
DEPRECATED RDW RBC AUTO: 45 FL (ref 35.1–46.3)
EOSINOPHIL # BLD AUTO: 0.04 X10(3) UL (ref 0–0.7)
EOSINOPHIL NFR BLD AUTO: 0.4 %
ERYTHROCYTE [DISTWIDTH] IN BLOOD BY AUTOMATED COUNT: 14 % (ref 11–15)
HCT VFR BLD AUTO: 31.9 %
HGB BLD-MCNC: 10.2 G/DL
IMM GRANULOCYTES # BLD AUTO: 0.03 X10(3) UL (ref 0–1)
IMM GRANULOCYTES NFR BLD: 0.3 %
IRON SATN MFR SERPL: 7 %
IRON SERPL-MCNC: 30 UG/DL
LYMPHOCYTES # BLD AUTO: 2.6 X10(3) UL (ref 1–4)
LYMPHOCYTES NFR BLD AUTO: 27.7 %
MCH RBC QN AUTO: 27.7 PG (ref 26–34)
MCHC RBC AUTO-ENTMCNC: 32 G/DL (ref 31–37)
MCV RBC AUTO: 86.7 FL
MONOCYTES # BLD AUTO: 0.66 X10(3) UL (ref 0.1–1)
MONOCYTES NFR BLD AUTO: 7 %
NEUTROPHILS # BLD AUTO: 6.02 X10 (3) UL (ref 1.5–7.7)
NEUTROPHILS # BLD AUTO: 6.02 X10(3) UL (ref 1.5–7.7)
NEUTROPHILS NFR BLD AUTO: 64.4 %
PLATELET # BLD AUTO: 307 10(3)UL (ref 150–450)
RBC # BLD AUTO: 3.68 X10(6)UL
TIBC SERPL-MCNC: 429 UG/DL (ref 250–425)
TRANSFERRIN SERPL-MCNC: 288 MG/DL (ref 250–380)
WBC # BLD AUTO: 9.4 X10(3) UL (ref 4–11)

## 2024-11-01 PROCEDURE — 84466 ASSAY OF TRANSFERRIN: CPT

## 2024-11-01 PROCEDURE — 85025 COMPLETE CBC W/AUTO DIFF WBC: CPT

## 2024-11-01 PROCEDURE — 82728 ASSAY OF FERRITIN: CPT

## 2024-11-01 PROCEDURE — 36415 COLL VENOUS BLD VENIPUNCTURE: CPT

## 2024-11-01 PROCEDURE — 83540 ASSAY OF IRON: CPT

## 2024-11-07 ENCOUNTER — OFFICE VISIT (OUTPATIENT)
Dept: HEMATOLOGY/ONCOLOGY | Facility: HOSPITAL | Age: 49
End: 2024-11-07
Attending: SPECIALIST
Payer: COMMERCIAL

## 2024-11-07 VITALS
HEART RATE: 78 BPM | DIASTOLIC BLOOD PRESSURE: 76 MMHG | BODY MASS INDEX: 40 KG/M2 | SYSTOLIC BLOOD PRESSURE: 116 MMHG | WEIGHT: 246.5 LBS | OXYGEN SATURATION: 100 % | TEMPERATURE: 99 F

## 2024-11-07 VITALS — OXYGEN SATURATION: 99 % | DIASTOLIC BLOOD PRESSURE: 69 MMHG | HEART RATE: 65 BPM | SYSTOLIC BLOOD PRESSURE: 109 MMHG

## 2024-11-07 DIAGNOSIS — D50.0 IRON DEFICIENCY ANEMIA SECONDARY TO BLOOD LOSS (CHRONIC): Primary | ICD-10-CM

## 2024-11-07 PROCEDURE — G2211 COMPLEX E/M VISIT ADD ON: HCPCS | Performed by: CLINICAL NURSE SPECIALIST

## 2024-11-07 PROCEDURE — 99214 OFFICE O/P EST MOD 30 MIN: CPT | Performed by: CLINICAL NURSE SPECIALIST

## 2024-11-07 PROCEDURE — 96374 THER/PROPH/DIAG INJ IV PUSH: CPT

## 2024-11-07 NOTE — PROGRESS NOTES
Chief Complaint   Patient presents with    Follow - Up    Infusion     Pt is here for iron infusion. She reports to feeling fatigued, having a lingering headache with mild SOB. Menstrual cycles have been more inconsistent since last visit; had two cycles over a short period at the end of Sept-mid Oct.     Education Record    Learner:  Patient    Disease / Diagnosis: CELINE    Barriers / Limitations:  None   Comments:    Method:  Brief focused   Comments:    General Topics:  Diet, Medication, Pain, Side effects and symptom management, and Plan of care reviewed   Comments:    Outcome:  Shows understanding   Comments:

## 2024-11-07 NOTE — PROGRESS NOTES
[facility]cer Center Progress Note    Patient Name: Carroll Best   YOB: 1975   Medical Record Number: [id number]   CSN: [id number]   Date of visit: 11/7/2023  Provider: CRISTIN Ivan  Referring Physician: Bradley Denson MD         Chief Complaint:  Follow up     The 21st Century Cures Act makes medical notes like these available to patients in the interest of transparency. Please be advised this is a medical document. Medical documents are intended to carry relevant information, facts as evident, and the clinical opinion of the practitioner. The medical note is intended as peer to peer communication and may appear blunt or direct. It is written in medical language and may contain abbreviations or verbiage that are unfamiliar.        Oncologic History:  Carroll Best is a 47 year old female who presented to the ED on 10/15/2022 with chest pain and dyspnea with exertion. Laboratory studies showed a Hb of 6.0 g/dl with low MCV, MCH, and MCHC. Patient also complained of right leg pain and swelling. D dimer was elevated resulting in CTA chest and right lower extremity Doppler ultrasound which showed bilateral lobar, segmental, and subsegmental pulmonary emboli and DVT from the right popliteal to distal posterior tibial veins.     Patient travelled by plane from [facility] on Thursday 10/13/2022. She noticed on the plane that her right leg was swollen below the knee and the pain in that leg was different than her usual right knee pain, though that had worsened over the last 4-5 weeks. Once she got off the plane, she had more dyspnea than she had before getting on the plane.      Patient was transfused PRBC and started on IV heparin. Upon discharge she was switched to apixaban. Laboratory workup showed iron deficiency anemia and she received IV iron sucrose.      She denies any previous history of VTE. She has no personal or family history of thrombophilia. No known family history of early onset  cardiovascular or cerebrovascular disease or miscarriages.      She reports chronic heavy menstrual periods and has uterine fibroids. During the admission, she was see by gynecology and started on medroxyprogesterone and advised to pursue hysterectomy.      A review of laboratory studies available in the electronic medical record system shows anemia dating back to at 11/2017; before that her hemoglobin was at the low end of normal. Red cell indices have been low since at least 08/2020. She states that she has tried oral iron but it causes excessive GI adverse effects.      Noted on her CTA chest is a round masslike area in the left breast measuring 1.6 x 1.8 cm with some areas of hyperdensity. The patient's last mammogram in the system is a screening mammogram from 02/2016 at which time there was noted a lobulated mass or masses in the left breast measuring 3.2 x 2 cm. Ultrasound confirmed the abnormality. 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 Patient had not undergone a repeat mammography until I ordered one in 11/2022.     As an outpatient, she received the test dose for iron dextran and had a reaction so instead received ferumoxytol 510 mg on 10/25/2022 and 11/02/2022.      Repeat right lower extremity Doppler ultrasound on 02/15/2023 showed resolution of the DVT. She remained on apixaban until medroxyprogesterone was discontinued in 03/2023.      Laboratory studies on 03/02/2023 showed iron deficiency anemia. Folic acid and vitamin B12 levels were within normal limits.      Patient has not yet pursued recommend endoscopic evaluation by gastroenterology.   Patient has not yet pursued a hysterectomy.         Interval Events:  49 year old  patient of Dr. Denson's  who presents for evaluation prior to iron infusion.  She had repeat labs several days ago that demonstrates iron deficiency with ferritin of 9.  The pt last had IV iron  2024.   She developed headache and fatigue prompting her to have her labs drawn.  She is having more frequent menstrual bleeding, though her flow is less.  She no longer has an IUD.  She has not yet pursued a hysterectomy.  She is hesitant to undergo GI endoscopic evaluation but she states she will eventually pursue it.  She does note craving ice chips and having some restless legs.  No SOB or tachycardia.  No syncopal episodes.      Allergies:  No Known Allergies    Social History     Socioeconomic History    Marital status:    Tobacco Use    Smoking status: Never    Smokeless tobacco: Never   Vaping Use    Vaping status: Never Used   Substance and Sexual Activity    Alcohol use: No     Alcohol/week: 0.0 standard drinks of alcohol    Drug use: No   Other Topics Concern    Caffeine Concern No        Past Medical History:    Lipid screening        Past Surgical History:   Procedure Laterality Date    Needle biopsy left      benign          x2    Treat ectopic preg,non remval            Vital Signs:  Height: --  Weight: 111.8 kg (246 lb 8 oz) (1300)  BSA (Calculated - sq m): --  Pulse: 78 (1300)  BP: 116/76 (1300)  Temp: 98.6 °F (37 °C) (1300)  Do Not Use - Resp Rate: --  SpO2: 100 % (1300)        Medications:    Current Outpatient Medications:     ergocalciferol 1.25 MG (01150 UT) Oral Cap, Take 1 capsule (50,000 Units total) by mouth once a week., Disp: 12 capsule, Rfl: 1    folic acid 1 MG Oral Tab, Take 1 tablet (1 mg total) by mouth daily., Disp: 90 tablet, Rfl: 0    ibuprofen 800 MG Oral Tab, Take 1 tablet (800 mg total) by mouth every 6 (six) hours as needed for Pain., Disp: 90 tablet, Rfl: 3    Review of Systems:   As in HPI    Physical Examination:  General: Awake, alert, oriented x3, no acute distress.    HEENT:  Anicteric, conjunctivae and sclerae clear, no sinus tenderness, no oropharyngeal lesion/thrush, mucous membranes are moist.  Neck:  Supple, no  tenderness, no masses or adenopathy  Lungs:  Clear to auscultation bilaterally  CV:  Regular rate and rhythm  Abdomen:  Non-distended, normoactive bowel sounds, soft,nontender, no hepatosplenomegaly.  Extremities:  No edema, no tenderness  Neuro:  CN 2-12 intact    ECO    Labs:   Latest Reference Range & Units 24 08:06   Iron, Serum 50 - 170 ug/dL 30 (L)   Transferrin 250 - 380 mg/dL 288   Iron Bind.Cap.(TIBC) 250 - 425 ug/dL 429 (H)   Iron Saturation 15 - 50 % 7 (L)   FERRITIN 50 - 306 ng/mL 9 (L)      Latest Reference Range & Units 24 08:06   WBC 4.0 - 11.0 x10(3) uL 9.4   Hemoglobin 12.0 - 16.0 g/dL 10.2 (L)   Hematocrit 35.0 - 48.0 % 31.9 (L)   Platelet Count 150.0 - 450.0 10(3)uL 307.0   RBC 3.80 - 5.30 x10(6)uL 3.68 (L)   MCH 26.0 - 34.0 pg 27.7   MCHC 31.0 - 37.0 g/dL 32.0   MCV 80.0 - 100.0 fL 86.7   RDW 11.0 - 15.0 % 14.0   RDW-SD 35.1 - 46.3 fL 45.0   Prelim Neutrophil Abs 1.50 - 7.70 x10 (3) uL 6.02   Neutrophils Absolute 1.50 - 7.70 x10(3) uL 6.02   Lymphocytes Absolute 1.00 - 4.00 x10(3) uL 2.60   Monocytes Absolute 0.10 - 1.00 x10(3) uL 0.66   Eosinophils Absolute 0.00 - 0.70 x10(3) uL 0.04   Basophils Absolute 0.00 - 0.20 x10(3) uL 0.02   Immature Granulocyte Absolute 0.00 - 1.00 x10(3) uL 0.03   Neutrophils % % 64.4   Lymphocytes % % 27.7   Monocytes % % 7.0   Eosinophils % % 0.4   Basophils % % 0.2   Immature Granulocyte % % 0.3   (L): Data is abnormally low      Impression/Plan:    Iron deficiency anemia:  Pt continues to be reluctant to have gynecologic surgery as well as GI endoscopic evaluation.  We will proceed with Feraheme x2 doses.    Repeat iron studies 6-8 wks.        CRISTIN Ivan  [facility]

## 2024-11-07 NOTE — PROGRESS NOTES
Education Record    Learner:  Patient    Disease / Diagnosis: iron deficiency    Barriers / Limitations:  None   Comments:    Method:  Brief focused   Comments:    General Topics:  Plan of care reviewed   Comments:    Outcome:  Shows understanding   Comments:    Feraheme given without complication.  Pt to rtc next week for 2nd dose of feraheme.  Pt discharged in stable condition.

## 2024-11-14 ENCOUNTER — OFFICE VISIT (OUTPATIENT)
Dept: HEMATOLOGY/ONCOLOGY | Facility: HOSPITAL | Age: 49
End: 2024-11-14
Attending: SPECIALIST
Payer: COMMERCIAL

## 2024-11-14 VITALS
OXYGEN SATURATION: 99 % | SYSTOLIC BLOOD PRESSURE: 105 MMHG | TEMPERATURE: 98 F | RESPIRATION RATE: 18 BRPM | HEART RATE: 73 BPM | DIASTOLIC BLOOD PRESSURE: 68 MMHG

## 2024-11-14 DIAGNOSIS — D50.0 IRON DEFICIENCY ANEMIA SECONDARY TO BLOOD LOSS (CHRONIC): Primary | ICD-10-CM

## 2024-11-14 PROCEDURE — 96365 THER/PROPH/DIAG IV INF INIT: CPT

## 2024-11-14 NOTE — PROGRESS NOTES
Pt here for Feraheme . Pt denies any issues or concerns.      Ordering Provider: Dr Denson  Order Exp: ongoing     Pt tolerated infusion without difficulty or complaint. Reviewed next apt date/time: labs in December      Education Record  Learner:  Patient  Disease / Diagnosis: CELINE  Barriers / Limitations:  None  Method:  Brief focused  General Topics:  Plan of care reviewed  Outcome:  Shows understanding

## 2025-01-02 ENCOUNTER — HOSPITAL ENCOUNTER (OUTPATIENT)
Age: 50
Discharge: HOME | End: 2025-01-02
Payer: COMMERCIAL

## 2025-01-02 VITALS
WEIGHT: 235 LBS | OXYGEN SATURATION: 99 % | RESPIRATION RATE: 16 BRPM | HEART RATE: 78 BPM | DIASTOLIC BLOOD PRESSURE: 62 MMHG | BODY MASS INDEX: 37.77 KG/M2 | TEMPERATURE: 99 F | HEIGHT: 66 IN | SYSTOLIC BLOOD PRESSURE: 115 MMHG

## 2025-01-02 DIAGNOSIS — J06.9 VIRAL URI: Primary | ICD-10-CM

## 2025-01-02 LAB
POCT INFLUENZA A: NEGATIVE
POCT INFLUENZA B: NEGATIVE

## 2025-01-02 RX ORDER — IBUPROFEN 600 MG/1
600 TABLET, FILM COATED ORAL ONCE
Status: COMPLETED | OUTPATIENT
Start: 2025-01-02 | End: 2025-01-02

## 2025-01-03 NOTE — ED INITIAL ASSESSMENT (HPI)
Pt sts chills, fever, HA, body aches began 4 days ago. Yesterday with sore throat. Negative at home covid test yesterday.

## 2025-01-03 NOTE — DISCHARGE INSTRUCTIONS
Rest and drink plenty of fluids.    This will help to thin the mucous in the back of your throat.   Take Tylenol and/or ibuprofen as needed for pain or fever.   Use Zyrtec, Claritin, or Allegra to help with nasal drainage.   You can take this twice a day.   Use Flonase nasal spray one spray each nostril twice a day.   You can also take Sudafed to help with sinus pressure or pain.   Get the medication behind the pharmacy counter.   Take Robitussin or Delsym as needed for cough.   Follow up with your PCP in 5-7 days.     Your symptoms should improve in the next 7-10 days; however, the cough can linger for much longer.     Thank you for choosing [facility] for your care.

## 2025-01-03 NOTE — ED PROVIDER NOTES
Patient Seen in: [facility]      History     Chief Complaint   Patient presents with    Sore Throat    Body ache and/or chills    Fever     Stated Complaint: sore throat fatigue chills    Subjective:   49-year-old female presents to the immediate care with complaint of bodyaches.  Patient states she started  not feeling well.  She has had fever, chills, nasal congestion, body aches, and headaches.  Yesterday she started with a sore throat.  Patient states she feels like she probably has flu.  She does work in healthcare.  She has been taking Tylenol and or ibuprofen as needed for her fevers.  She has not been taking anything for the nasal congestion or sore throat.  She denies any ear pain, ear drainage, difficulty swallowing, voice changes, cough, shortness of breath, or chest pain.    The history is provided by the patient.         Objective:     Past Medical History:    Anemia    Lipid screening    Pulmonary embolism (HCC)    3 years ago.              Past Surgical History:   Procedure Laterality Date    Needle biopsy left  2016    benign          x2    Treat ectopic preg,non remval                  No pertinent social history.            Review of Systems   Constitutional:  Positive for chills, fatigue and fever.   HENT:  Positive for congestion and sore throat. Negative for ear discharge, ear pain, rhinorrhea, trouble swallowing and voice change.    Respiratory: Negative.  Negative for cough.    Musculoskeletal:  Positive for myalgias.   Neurological:  Positive for headaches. Negative for dizziness and light-headedness.   All other systems reviewed and are negative.      Positive for stated complaint: sore throat fatigue chills  Other systems are as noted in HPI.  Constitutional and vital signs reviewed.      All other systems reviewed and negative except as noted above.    Physical Exam     ED Triage Vitals [25 1827]   /62   Pulse 78   Resp 16   Temp 98.8 °F (37.1 °C)    Temp src Oral   SpO2 99 %   O2 Device None (Room air)       Current Vitals:   Vital Signs  BP: 115/62  Pulse: 78  Resp: 16  Temp: 98.8 °F (37.1 °C)  Temp src: Oral    Oxygen Therapy  SpO2: 99 %  O2 Device: None (Room air)        Physical Exam  Vitals and nursing note reviewed.   Constitutional:       General: She is not in acute distress.     Appearance: Normal appearance. She is obese. She is ill-appearing.   HENT:      Head: Normocephalic and atraumatic.      Right Ear: Tympanic membrane, ear canal and external ear normal.      Left Ear: Tympanic membrane, ear canal and external ear normal.      Nose: Congestion present.      Mouth/Throat:      Mouth: Mucous membranes are moist.      Pharynx: Oropharynx is clear. Posterior oropharyngeal erythema present.      Comments: Postnasal drainage.  Eyes:      Conjunctiva/sclera: Conjunctivae normal.      Pupils: Pupils are equal, round, and reactive to light.   Cardiovascular:      Rate and Rhythm: Normal rate and regular rhythm.      Pulses: Normal pulses.      Heart sounds: Normal heart sounds.   Pulmonary:      Effort: Pulmonary effort is normal. No respiratory distress.      Breath sounds: Normal breath sounds.   Musculoskeletal:         General: Normal range of motion.   Skin:     General: Skin is warm and dry.   Neurological:      General: No focal deficit present.      Mental Status: She is alert and oriented to person, place, and time.   Psychiatric:         Mood and Affect: Mood normal.         Behavior: Behavior normal.           ED Course     Labs Reviewed   POCT FLU TEST - Normal    Narrative:     This assay is a rapid molecular in vitro test utilizing nucleic acid amplification of influenza A and B viral RNA.       ED Course as of 01/02/25 1933  ------------------------------------------------------------  Time: 01/02 1918  Value: POCT Flu Test  Comment: Negative.           [facility]        Medical Decision Making  49-year-old female with viral URI symptoms since  Sunday.  Rapid influenza was ordered.  This is negative.  Hx and exam are consistent with a viral infection.  No indication for any other labs, imaging, or abx.  No evidence of sepsis, strep, otitis, pneumonia, bacterial sinusitis, or other bacterial etiology.  Counseled patient on supportive management at home.  F/u with PCP in a few days.  Verbalized understanding and agreement.    Amount and/or Complexity of Data Reviewed  Labs: ordered. Decision-making details documented in ED Course.    Risk  OTC drugs.        Disposition and Plan     Clinical Impression:  1. Viral URI         Disposition:  Discharge  1/2/2025  7:19 pm    Follow-up:  Ann Lacy MD  [address] [place] [id number]  [phone]    In 1 week  As needed          Medications Prescribed:  Discharge Medication List as of 1/2/2025  7:19 PM              Supplementary Documentation:

## 2025-01-16 ENCOUNTER — APPOINTMENT (OUTPATIENT)
Dept: ULTRASOUND IMAGING | Facility: HOSPITAL | Age: 50
End: 2025-01-16
Attending: EMERGENCY MEDICINE
Payer: COMMERCIAL

## 2025-01-16 ENCOUNTER — HOSPITAL ENCOUNTER (OUTPATIENT)
Facility: HOSPITAL | Age: 50
Setting detail: OBSERVATION
Discharge: HOME | End: 2025-01-17
Attending: EMERGENCY MEDICINE | Admitting: STUDENT IN AN ORGANIZED HEALTH CARE EDUCATION/TRAINING PROGRAM
Payer: COMMERCIAL

## 2025-01-16 DIAGNOSIS — K80.50 BILIARY COLIC: Primary | ICD-10-CM

## 2025-01-16 DIAGNOSIS — R52 INTRACTABLE PAIN: ICD-10-CM

## 2025-01-16 DIAGNOSIS — E86.0 DEHYDRATION: ICD-10-CM

## 2025-01-16 LAB
ALBUMIN SERPL-MCNC: 4.6 G/DL (ref 3.2–4.8)
ALBUMIN/GLOB SERPL: 1.3 {RATIO} (ref 1–2)
ALP LIVER SERPL-CCNC: 84 U/L
ALT SERPL-CCNC: 29 U/L
ANION GAP SERPL CALC-SCNC: 9 MMOL/L (ref 0–18)
AST SERPL-CCNC: 29 U/L (ref ?–34)
B-HCG UR QL: NEGATIVE
BASOPHILS # BLD AUTO: 0.03 X10(3) UL (ref 0–0.2)
BASOPHILS NFR BLD AUTO: 0.3 %
BILIRUB SERPL-MCNC: 0.3 MG/DL (ref 0.3–1.2)
BILIRUB UR QL STRIP.AUTO: NEGATIVE
BUN BLD-MCNC: 14 MG/DL (ref 9–23)
CALCIUM BLD-MCNC: 9.9 MG/DL (ref 8.7–10.6)
CHLORIDE SERPL-SCNC: 106 MMOL/L (ref 98–112)
CLARITY UR REFRACT.AUTO: CLEAR
CO2 SERPL-SCNC: 26 MMOL/L (ref 21–32)
COLOR UR AUTO: YELLOW
CREAT BLD-MCNC: 1.17 MG/DL
EGFRCR SERPLBLD CKD-EPI 2021: 58 ML/MIN/1.73M2 (ref 60–?)
EOSINOPHIL # BLD AUTO: 0 X10(3) UL (ref 0–0.7)
EOSINOPHIL NFR BLD AUTO: 0 %
ERYTHROCYTE [DISTWIDTH] IN BLOOD BY AUTOMATED COUNT: 14.5 %
GLOBULIN PLAS-MCNC: 3.6 G/DL (ref 2–3.5)
GLUCOSE BLD-MCNC: 132 MG/DL (ref 70–99)
GLUCOSE UR STRIP.AUTO-MCNC: NORMAL MG/DL
HCT VFR BLD AUTO: 35.3 %
HGB BLD-MCNC: 11.9 G/DL
IMM GRANULOCYTES # BLD AUTO: 0.05 X10(3) UL (ref 0–1)
IMM GRANULOCYTES NFR BLD: 0.4 %
LEUKOCYTE ESTERASE UR QL STRIP.AUTO: NEGATIVE
LIPASE SERPL-CCNC: 50 U/L (ref 12–53)
LYMPHOCYTES # BLD AUTO: 1.51 X10(3) UL (ref 1–4)
LYMPHOCYTES NFR BLD AUTO: 13.1 %
MCH RBC QN AUTO: 29.3 PG (ref 26–34)
MCHC RBC AUTO-ENTMCNC: 33.7 G/DL (ref 31–37)
MCV RBC AUTO: 86.9 FL
MONOCYTES # BLD AUTO: 0.5 X10(3) UL (ref 0.1–1)
MONOCYTES NFR BLD AUTO: 4.3 %
NEUTROPHILS # BLD AUTO: 9.48 X10 (3) UL (ref 1.5–7.7)
NEUTROPHILS # BLD AUTO: 9.48 X10(3) UL (ref 1.5–7.7)
NEUTROPHILS NFR BLD AUTO: 81.9 %
NITRITE UR QL STRIP.AUTO: NEGATIVE
OSMOLALITY SERPL CALC.SUM OF ELEC: 294 MOSM/KG (ref 275–295)
PH UR STRIP.AUTO: 7 [PH] (ref 5–8)
PLATELET # BLD AUTO: 299 10(3)UL (ref 150–450)
POTASSIUM SERPL-SCNC: 3.8 MMOL/L (ref 3.5–5.1)
PROT SERPL-MCNC: 8.2 G/DL (ref 5.7–8.2)
PROT UR STRIP.AUTO-MCNC: 30 MG/DL
RBC # BLD AUTO: 4.06 X10(6)UL
RBC UR QL AUTO: NEGATIVE
SODIUM SERPL-SCNC: 141 MMOL/L (ref 136–145)
SP GR UR STRIP.AUTO: >1.03 (ref 1–1.03)
UROBILINOGEN UR STRIP.AUTO-MCNC: NORMAL MG/DL
WBC # BLD AUTO: 11.6 X10(3) UL (ref 4–11)

## 2025-01-16 PROCEDURE — 76705 ECHO EXAM OF ABDOMEN: CPT | Performed by: EMERGENCY MEDICINE

## 2025-01-16 RX ORDER — MORPHINE SULFATE 4 MG/ML
4 INJECTION, SOLUTION INTRAMUSCULAR; INTRAVENOUS ONCE
Status: COMPLETED | OUTPATIENT
Start: 2025-01-16 | End: 2025-01-16

## 2025-01-16 RX ORDER — ONDANSETRON 2 MG/ML
4 INJECTION INTRAMUSCULAR; INTRAVENOUS ONCE
Status: COMPLETED | OUTPATIENT
Start: 2025-01-16 | End: 2025-01-16

## 2025-01-17 ENCOUNTER — TELEPHONE (OUTPATIENT)
Facility: LOCATION | Age: 50
End: 2025-01-17

## 2025-01-17 VITALS
TEMPERATURE: 98 F | HEART RATE: 69 BPM | SYSTOLIC BLOOD PRESSURE: 120 MMHG | OXYGEN SATURATION: 97 % | HEIGHT: 67 IN | BODY MASS INDEX: 37.35 KG/M2 | DIASTOLIC BLOOD PRESSURE: 70 MMHG | WEIGHT: 238 LBS | RESPIRATION RATE: 16 BRPM

## 2025-01-17 DIAGNOSIS — K80.50 BILIARY COLIC: Primary | ICD-10-CM

## 2025-01-17 PROBLEM — E86.0 DEHYDRATION: Status: ACTIVE | Noted: 2025-01-17

## 2025-01-17 PROBLEM — R52 INTRACTABLE PAIN: Status: ACTIVE | Noted: 2025-01-17

## 2025-01-17 PROBLEM — R73.9 HYPERGLYCEMIA: Status: ACTIVE | Noted: 2025-01-17

## 2025-01-17 PROBLEM — D64.9 ANEMIA: Status: ACTIVE | Noted: 2025-01-17

## 2025-01-17 PROCEDURE — 99221 1ST HOSP IP/OBS SF/LOW 40: CPT | Performed by: STUDENT IN AN ORGANIZED HEALTH CARE EDUCATION/TRAINING PROGRAM

## 2025-01-17 RX ORDER — ONDANSETRON 2 MG/ML
4 INJECTION INTRAMUSCULAR; INTRAVENOUS EVERY 4 HOURS PRN
Status: ACTIVE | OUTPATIENT
Start: 2025-01-17 | End: 2025-01-17

## 2025-01-17 RX ORDER — HYDROMORPHONE HYDROCHLORIDE 1 MG/ML
0.5 INJECTION, SOLUTION INTRAMUSCULAR; INTRAVENOUS; SUBCUTANEOUS ONCE
Status: DISCONTINUED | OUTPATIENT
Start: 2025-01-17 | End: 2025-01-17

## 2025-01-17 RX ORDER — ACETAMINOPHEN 500 MG
1000 TABLET ORAL EVERY 8 HOURS
Status: DISCONTINUED | OUTPATIENT
Start: 2025-01-17 | End: 2025-01-17

## 2025-01-17 RX ORDER — SODIUM CHLORIDE 9 MG/ML
INJECTION, SOLUTION INTRAVENOUS CONTINUOUS
Status: DISCONTINUED | OUTPATIENT
Start: 2025-01-17 | End: 2025-01-17

## 2025-01-17 RX ORDER — HYDROMORPHONE HYDROCHLORIDE 1 MG/ML
0.5 INJECTION, SOLUTION INTRAMUSCULAR; INTRAVENOUS; SUBCUTANEOUS EVERY 30 MIN PRN
Status: ACTIVE | OUTPATIENT
Start: 2025-01-17 | End: 2025-01-17

## 2025-01-17 RX ORDER — TRAMADOL HYDROCHLORIDE 50 MG/1
50 TABLET ORAL EVERY 6 HOURS PRN
Status: DISCONTINUED | OUTPATIENT
Start: 2025-01-17 | End: 2025-01-17

## 2025-01-17 RX ORDER — IBUPROFEN 200 MG
400 TABLET ORAL EVERY 6 HOURS PRN
Status: DISCONTINUED | OUTPATIENT
Start: 2025-01-17 | End: 2025-01-17

## 2025-01-17 RX ORDER — KETOROLAC TROMETHAMINE 15 MG/ML
15 INJECTION, SOLUTION INTRAMUSCULAR; INTRAVENOUS ONCE
Status: COMPLETED | OUTPATIENT
Start: 2025-01-17 | End: 2025-01-17

## 2025-01-17 NOTE — PROGRESS NOTES
NURSING ADMISSION NOTE      Patient admitted via Cart  Oriented to room 0011  Safety precautions initiated.  Bed in low position.    Admission navigator completed.   Alert x4. Room air. C/o upper abd pain, states relief from ER dose prn pain meds. Voids. Up standby/ self. Denies any nausea or vomiting at this time. Emesis bags provided per pt requests. Updated on POC, no questions or concerns at this time.     Call light in reach.

## 2025-01-17 NOTE — ED PROVIDER NOTES
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  Symptoms started overnight.  She has severe pain in her right upper quadrant with vomiting.  Radiates to her back.  Similar to when she was diagnosed with gallstones once in the past.  No fever.  No urinary symptoms.  No chest pain or difficulty breathing.  Prior records reviewed patient seen at the cancer center, had been diagnosed with DVT and PE in 2022.  Was seen in 2024 for iron infusion  Objective:     Past Medical History:    Anemia    Lipid screening    Pulmonary embolism (HCC)    3 years ago.              Past Surgical History:   Procedure Laterality Date    Needle biopsy left      benign          x2    Treat ectopic preg,non remval                  Social History     Socioeconomic History    Marital status:    Tobacco Use    Smoking status: Never    Smokeless tobacco: Never   Vaping Use    Vaping status: Never Used   Substance and Sexual Activity    Alcohol use: No     Alcohol/week: 0.0 standard drinks of alcohol    Drug use: No   Other Topics Concern    Caffeine Concern No                  Physical Exam     ED Triage Vitals [25]   /77   Pulse 65   Resp 18   Temp 97.9 °F (36.6 °C)   Temp src    SpO2 100 %   O2 Device None (Room air)       Current Vitals:   Vital Signs  BP: 126/73  Pulse: 82  Resp: 20  Temp: 97.9 °F (36.6 °C)  MAP (mmHg): 87    Oxygen Therapy  SpO2: 100 %  O2 Device: None (Room air)        Physical Exam  General: Patient is awake, alert in mild to moderate pain acute distress.   HEENT:    Sclera are not icteric.  Conjunctivae within normal limits.  Mucous members are moist.   Cardiovascular: Regular rate and rhythm,  normal S1-S2.  Respiratory: Lungs are clear to auscultation bilaterally.   Abdomen: Soft, moderately tender in the right upper quadrant and epigastric, nondistended.  Extremities: No edema.  Skin: warm and dry, no diaphoresis  ED Course     Labs Reviewed   COMP METABOLIC PANEL (14) - Abnormal; Notable for the following components:       Result Value    Glucose 132 (*)     Creatinine 1.17 (*)     eGFR-Cr 58 (*)     Globulin  3.6 (*)     All other components within normal limits   CBC WITH DIFFERENTIAL WITH PLATELET - Abnormal; Notable for the following components:    WBC 11.6 (*)     HGB 11.9 (*)     Neutrophil Absolute Prelim 9.48 (*)     Neutrophil Absolute 9.48 (*)     All other components within normal limits   URINALYSIS WITH CULTURE REFLEX - Abnormal; Notable for the following components:    Spec Gravity >1.030 (*)     Ketones Urine Trace (*)     Protein Urine 30 (*)     Squamous Epi. Cells Few (*)     All other components within normal limits   LIPASE - Normal   POCT PREGNANCY URINE - Normal   RAINBOW DRAW LAVENDER   RAINBOW DRAW LIGHT GREEN   RAINBOW DRAW BLUE     Ultrasound gallbladder: I personally reviewed the radiographs and my individual interpretation shows gallstones.  I also reviewed the official report which showed gallstones in the gallbladder lumen and neck with elongated gallbladder measuring at least 9.8 cm in length, borderline dilated in transverse dimension 3.5 cm.       Fluids Zofran morphine ordered       MDM          Previous records reviewed as noted in HPI    Differential includes, but is not limited to, cholecystitis, Ruptured ectopic pregnancy, biliary colic    Review of any laboratory testing: CBC with mild leukocytosis, minimal anemia.  CMP noncontributory.  Lipase normal.  Yash test negative.     Review of any radiographic studies: Ultrasound with gallstones no overt cholecystitis    Shared decision making with the patient.  On reevaluation patient still uncomfortable.  Will be  admitted for possible surgical intervention.    Management of patient was discussed with : Dr. Nuñez, general surgery and patient     The administration of these medications were addressed : Zofran and morphine                         Admission disposition: 1/17/2025 12:48 AM           Medical Decision Making      Disposition and Plan     Clinical Impression:  1. Biliary colic    2. Dehydration    3. Intractable pain         Disposition:  Admit  1/17/2025 12:48 am    Follow-up:  No follow-up provider specified.        Medications Prescribed:  Current Discharge Medication List              Supplementary Documentation:         Hospital Problems       Present on Admission  Date Reviewed: 1/2/2025            ICD-10-CM Noted POA    * (Principal) Biliary colic K80.50 1/17/2025 Unknown    Anemia D64.9 1/17/2025 Yes    Hyperglycemia R73.9 1/17/2025 Yes

## 2025-01-17 NOTE — ED QUICK NOTES
Pt requesting for pain med, states that its back to 10/10 now. ER MD informed, verbal order obtained.

## 2025-01-17 NOTE — ED QUICK NOTES
Orders for admission, patient is aware of plan and ready to go upstairs. Any questions, please call ED RN Obdulio at extension [phone].     Patient Covid vaccination status: Fully vaccinated     COVID Test Ordered in ED: None    COVID Suspicion at Admission: N/A    Running Infusions:      Mental Status/LOC at time of transport: A&O x 4    Other pertinent information:   CIWA score: N/A   NIH score:  N/A

## 2025-01-17 NOTE — PROGRESS NOTES
NURSING DISCHARGE NOTE    Discharged Home via Wheelchair.  Accompanied by Family member  Belongings Taken by patient/family.    Patient given discharge instructions. Instructed to follow-up with Dr. Nuñez on Thursday, 1/23 for cholecystectomy surgery. Instructed to continue low fat diet. Instructed to take OTC tylenol or ibuprofen for pain prn. Patient verbalized understanding. IV removed and intact. All questions and concerns addressed at this time.

## 2025-01-17 NOTE — TELEPHONE ENCOUNTER
No prior authorization required for cpt code 11079. Ref #: [id number]. Spoke with Andrés BURGER @ 11:01am on 1/17

## 2025-01-17 NOTE — PROGRESS NOTES
Alert and Oriented .  VSS  Afebrile.  Clear liquids ordered.  Will start on clear liquids and monitor.  Patient states pain level 3/10 and declines pain medication at this time.  No orders given for IVF.    Patient resting calmly in bed.   All questions and concerns addressed.

## 2025-01-17 NOTE — PROGRESS NOTES
Patient tolerating low fat diet. Reports mild, but tolerable abd pain pain. Tolerating fluids. Denies nausea. Surgical team updated. Orders to discharge.

## 2025-01-17 NOTE — ED INITIAL ASSESSMENT (HPI)
PT WITH UPPER ABD PAIN RADIATION TO BACK, CONCERNED FOR GALLBLADDER, HAD SIMILAR BEFORE. +DRY HEAVING DENIES VOMITING.

## 2025-01-17 NOTE — ED QUICK NOTES
Rounding Completed    Plan of Care reviewed. Waiting for further orders.  Elimination needs assessed.  Vitals rechecked and recorded, pain reassessment done     Bed is locked and in lowest position. Call light within reach.

## 2025-01-17 NOTE — PROGRESS NOTES
Alert and Oriented x 4.  VSS  Afebrile  Patient started on clear liquids and tolerated it well  Denies nausea or emesis.  Abdomen softly distended.  Diet advanced to Low Fat per orders.  States pain is rated 3-4/10   Declined pain medication.   Up in room/bathroom voiding without problem.  No orders given for IVF.

## 2025-01-17 NOTE — H&P
[facility]  Report of Surgical History and Physical Exam    Carroll Best Patient Status:  Observation    10/9/1975 MRN [id number]   Location [facility] [place] 0SW-A Attending Onelia Nuñez MD   Hosp Day # 0 PCP Ann Lacy MD     Chief Complaint: Abdominal pain    History of Present Illness:  Carroll Best is a a(n) 49 year old female who presented to hospital with worsening right upper quadrant abdominal pain with nausea and vomiting.  Patient reports having similar episode back in July of last year that resolved.  In the emergency room, patient was hemodynamically stable.  Serology was all normal.  Patient had ultrasound imaging was demonstrated cholelithiasis with a gallstone in the neck of the gallbladder, no signs of acute cholecystitis.  On interview, patient reports significant improvement of her pain and symptoms.  Her only episode of similar symptoms was back in July when she was diagnosed with gallstones.  Her only abdominal surgery was laparoscopic removal of an ectopic pregnancy.      History:  Past Medical History:    Anemia    Lipid screening    Pulmonary embolism (HCC)    3 years ago.       Past Surgical History:   Procedure Laterality Date    Needle biopsy left      benign          x2    Treat ectopic preg,non remval         Family History   Problem Relation Age of Onset    Arthritis Father         osteoarthritis        reports that she has never smoked. She has never used smokeless tobacco. She reports that she does not drink alcohol and does not use drugs.      Allergies:  Allergies[1]      Medications:    Current Facility-Administered Medications:     HYDROmorphone (Dilaudid) 1 MG/ML injection 0.5 mg, 0.5 mg, Intravenous, Once      Review of Systems:  The review of systems was negative other than the above listed HPI and past medical history including the HEENT, Heart, Lungs, GI, , Neuro, Musculoskeletal, Hematologic, Endocrine and Psych.        Physical Exam:  Blood pressure 112/65, pulse 63, temperature 98 °F (36.7 °C), temperature source Oral, resp. rate 16, height 67\", weight 238 lb (108 kg), last menstrual period 01/09/2025, SpO2 96%.  General: Alert, orientated x3.  Cooperative.  No apparent distress.  HEENT: Exam is unremarkable.  Without scleral icterus.  Mucous membranes are moist. EOM are WNL.  Neck: No tenderness to palpitation.  Full range of motion to flexion and extension, lateral rotation and lateral flexion of cervical spine.  No JVD. Supple.   Lungs: Bilateral chest rise. Good excursion of the diaphragms. No secondary use of accessory respiratory musculature.  Cardiac: Regular rate and rhythm. No murmur.  Abdomen: Soft, mildly tender to palpation in the right upper quadrant, nondistended  Extremities:  No lower extremity edema noted.  Without clubbing or cyanosis.  2+ pulses x4  Skin: Normal texture and turgor.      Laboratory Data and Relevant Imaging:  Recent Labs   Lab 01/16/25  2138   RBC 4.06   HGB 11.9*   HCT 35.3   MCV 86.9   MCH 29.3   MCHC 33.7   RDW 14.5   NEPRELIM 9.48*   WBC 11.6*   .0       Recent Labs   Lab 01/16/25  2138   *   BUN 14   CREATSERUM 1.17*   CA 9.9   ALB 4.6      K 3.8      CO2 26.0   ALKPHO 84   AST 29   ALT 29   BILT 0.3   TP 8.2         No results for input(s): \"PTP\", \"INR\", \"PTT\" in the last 168 hours.    Imaging  US GALLBLADDER (CPT=76705)    Result Date: 1/16/2025  CONCLUSION:  Gallstones, including gallstones in lumen and gallbladder neck, and mildly dilated gallbladder.  No wall thickening or Bailon sign.   LOCATION:  [facility]    Dictated by (CST): Fawad Gayle MD on 1/16/2025 at 11:46 PM     Finalized by (CST): Fawad Gayle MD on 1/16/2025 at 11:51 PM            Impression/Plan:  Patient Active Problem List   Diagnosis    Routine physical examination    Knee pain, right anterior    Encounter for routine gynecological examination    Cervicalgia    Vertigo    Carpal  tunnel syndrome    Vitamin D deficiency    Cough    Suspected COVID-19 virus infection    Obesity (BMI 35.0-39.9 without comorbidity)    Primary osteoarthritis of knees, bilateral    Chondromalacia, patella, right    Hypokalemia    Symptomatic anemia    Menorrhagia with regular cycle    Uterine leiomyoma, unspecified location    Exertional chest pain    Abnormal EKG    Multiple subsegmental pulmonary emboli without acute cor pulmonale (HCC)    Mass of left breast, unspecified quadrant    Leg DVT (deep venous thromboembolism), acute, right (HCC)    Iron deficiency anemia secondary to blood loss (chronic)    Anemia    Hyperglycemia    Biliary colic    Dehydration    Intractable pain       49 year old female with biliary colic    Ultrasound images were reviewed by me  Ultrasound shows cholelithiasis with a gallstone in the neck of the gallbladder, but no pericholecystic fluid or Bailon sign concerning for acute cholecystitis  On physical exam, patient has some tenderness to palpation in the right upper quadrant  Overall, she states her symptoms have significantly improved  I recommend p.o. challenge with clear liquids and advancing to low-fat diet as tolerated  If patient does not have any recurrence of her symptoms, she can be discharged  Will schedule patient for elective cholecystectomy on Thursday of next week  We will plan for laparoscopic cholecystectomy with ICG guidance  Procedure explained to the patient  Risks including bleeding, pain, infection, bile leak, injury to the common bile duct, and need for further intervention all discussed with the patient  Postoperative restrictions also discussed, these include no heavy lifting greater than 15 to 20 pounds for 4 weeks  Otherwise, if patient has worsening symptoms, she will need to undergo surgery sooner  Patient acknowledged understanding and agreed to this plan    The patient does well and is discharged, she should call the office for any questions or  concerns, especially any changes in her symptoms    Thank you for letting me participate in the care of this patient    Onelia Nuñez MD  1/17/2025  8:45 AM         [1] No Known Allergies

## 2025-01-22 ENCOUNTER — TELEPHONE (OUTPATIENT)
Facility: LOCATION | Age: 50
End: 2025-01-22

## 2025-01-22 DIAGNOSIS — K80.50 BILIARY COLIC: Primary | ICD-10-CM

## 2025-01-22 NOTE — PAT NURSING NOTE
Patient informs PAT that she is postponing surgery for tomorrow.  She thought it was next week.  Dr Nuñez's office was notified at [phone].

## 2025-01-22 NOTE — TELEPHONE ENCOUNTER
Patient called to reschedule surgery     She had her days mixed and thought her surgery was next week.

## 2025-01-28 ENCOUNTER — TELEPHONE (OUTPATIENT)
Dept: FAMILY MEDICINE CLINIC | Facility: CLINIC | Age: 50
End: 2025-01-28

## 2025-01-28 DIAGNOSIS — G89.29 CHRONIC PAIN OF RIGHT KNEE: ICD-10-CM

## 2025-01-28 DIAGNOSIS — E55.9 VITAMIN D DEFICIENCY: ICD-10-CM

## 2025-01-28 DIAGNOSIS — M25.561 CHRONIC PAIN OF RIGHT KNEE: ICD-10-CM

## 2025-01-28 RX ORDER — ERGOCALCIFEROL 1.25 MG/1
50000 CAPSULE, LIQUID FILLED ORAL WEEKLY
Qty: 12 CAPSULE | Refills: 1 | Status: CANCELLED | OUTPATIENT
Start: 2025-01-28

## 2025-01-31 RX ORDER — IBUPROFEN 800 MG/1
800 TABLET, FILM COATED ORAL EVERY 6 HOURS PRN
Qty: 90 TABLET | Refills: 1 | Status: SHIPPED | OUTPATIENT
Start: 2025-01-31

## 2025-01-31 NOTE — TELEPHONE ENCOUNTER
Please Review. Protocol Failed; No Protocol   Mycharted patient to schedule an appointment.   Sent to Patient  to call patient to schedule an appointment     Requested Prescriptions   Pending Prescriptions Disp Refills    ibuprofen 800 MG Oral Tab 90 tablet 3     Sig: Take 1 tablet (800 mg total) by mouth every 6 (six) hours as needed for Pain.       Non-Narcotic Pain Medication Protocol Failed - 1/31/2025  2:28 PM        Failed - In person appointment or virtual visit in the past 6 mos or appointment in next 3 mos     Recent Outpatient Visits              2 months ago Iron deficiency anemia secondary to blood loss (chronic)    [facility]    Office Visit    2 months ago Iron deficiency anemia secondary to blood loss (chronic)    [facility]    [facility] Visit    2 months ago Iron deficiency anemia secondary to blood loss (chronic)    [facility]    Office Visit    6 months ago Iron deficiency anemia secondary to blood loss (chronic)    [facility] [place] [facility] [facility]    Office Visit    6 months ago Iron deficiency anemia secondary to blood loss (chronic)    [facility]    Office Visit                      Passed - Medication is active on med list                 Recent Outpatient Visits              2 months ago Iron deficiency anemia secondary to blood loss (chronic)    [facility]    Office Visit    2 months ago Iron deficiency anemia secondary to blood loss (chronic)    [facility]    Office Visit    2 months ago Iron deficiency anemia secondary to blood loss (chronic)    [facility]    Office Visit    6 months ago Iron deficiency anemia secondary to blood loss (chronic)    [facility], Bradley Menjivar MD    Office Visit    6 months ago Iron deficiency anemia secondary to blood loss (chronic)    [facility] [place]    Office Visit

## 2025-01-31 NOTE — TELEPHONE ENCOUNTER
Refill too soon.     [employer] message to patient to contact pharmacy for refill, vitamin D medication was sent to pharmacy on 09/19/2024 with 1 refill.     Requested Prescriptions     Pending Prescriptions Disp Refills    ergocalciferol 1.25 MG (58846 UT) Oral Cap 12 capsule 1     Sig: Take 1 capsule (50,000 Units total) by mouth once a week.

## 2025-02-03 NOTE — TELEPHONE ENCOUNTER
Name and  verified.     The patient called back - informed her she needs to schedule an appointment, although ibuprofen was already sent in. The patient understood and she was warm transferred to scheduling to schedule her annual appointment.

## 2025-02-25 ENCOUNTER — LAB ENCOUNTER (OUTPATIENT)
Dept: LAB | Age: 50
End: 2025-02-25
Attending: STUDENT IN AN ORGANIZED HEALTH CARE EDUCATION/TRAINING PROGRAM
Payer: COMMERCIAL

## 2025-02-25 DIAGNOSIS — D50.0 IRON DEFICIENCY ANEMIA SECONDARY TO BLOOD LOSS (CHRONIC): ICD-10-CM

## 2025-02-25 DIAGNOSIS — Z01.818 PRE-OP TESTING: ICD-10-CM

## 2025-02-25 LAB
BASOPHILS # BLD AUTO: 0.03 X10(3) UL (ref 0–0.2)
BASOPHILS NFR BLD AUTO: 0.4 %
DEPRECATED HBV CORE AB SER IA-ACNC: 7 NG/ML
EOSINOPHIL # BLD AUTO: 0.08 X10(3) UL (ref 0–0.7)
EOSINOPHIL NFR BLD AUTO: 1 %
ERYTHROCYTE [DISTWIDTH] IN BLOOD BY AUTOMATED COUNT: 13.4 %
HCT VFR BLD AUTO: 30.9 %
HGB BLD-MCNC: 10 G/DL
IMM GRANULOCYTES # BLD AUTO: 0.03 X10(3) UL (ref 0–1)
IMM GRANULOCYTES NFR BLD: 0.4 %
IRON SATN MFR SERPL: 10 %
IRON SERPL-MCNC: 33 UG/DL
LYMPHOCYTES # BLD AUTO: 2.53 X10(3) UL (ref 1–4)
LYMPHOCYTES NFR BLD AUTO: 31.9 %
MCH RBC QN AUTO: 28.3 PG (ref 26–34)
MCHC RBC AUTO-ENTMCNC: 32.4 G/DL (ref 31–37)
MCV RBC AUTO: 87.5 FL
MONOCYTES # BLD AUTO: 0.58 X10(3) UL (ref 0.1–1)
MONOCYTES NFR BLD AUTO: 7.3 %
NEUTROPHILS # BLD AUTO: 4.68 X10 (3) UL (ref 1.5–7.7)
NEUTROPHILS # BLD AUTO: 4.68 X10(3) UL (ref 1.5–7.7)
NEUTROPHILS NFR BLD AUTO: 59 %
PLATELET # BLD AUTO: 287 10(3)UL (ref 150–450)
RBC # BLD AUTO: 3.53 X10(6)UL
TOTAL IRON BINDING CAPACITY: 319 UG/DL (ref 250–425)
TRANSFERRIN SERPL-MCNC: 252 MG/DL (ref 250–380)
WBC # BLD AUTO: 7.9 X10(3) UL (ref 4–11)

## 2025-02-25 PROCEDURE — 83550 IRON BINDING TEST: CPT

## 2025-02-25 PROCEDURE — 83540 ASSAY OF IRON: CPT

## 2025-02-25 PROCEDURE — 36415 COLL VENOUS BLD VENIPUNCTURE: CPT

## 2025-02-25 PROCEDURE — 82728 ASSAY OF FERRITIN: CPT

## 2025-02-25 PROCEDURE — 85025 COMPLETE CBC W/AUTO DIFF WBC: CPT

## 2025-02-27 ENCOUNTER — TELEPHONE (OUTPATIENT)
Facility: LOCATION | Age: 50
End: 2025-02-27

## 2025-02-27 ENCOUNTER — HOSPITAL ENCOUNTER (OUTPATIENT)
Facility: HOSPITAL | Age: 50
Setting detail: HOSPITAL OUTPATIENT SURGERY
Discharge: HOME | End: 2025-02-27
Attending: STUDENT IN AN ORGANIZED HEALTH CARE EDUCATION/TRAINING PROGRAM | Admitting: STUDENT IN AN ORGANIZED HEALTH CARE EDUCATION/TRAINING PROGRAM
Payer: COMMERCIAL

## 2025-02-27 ENCOUNTER — ANESTHESIA EVENT (OUTPATIENT)
Dept: PERIOP | Facility: HOSPITAL | Age: 50
End: 2025-02-27
Payer: COMMERCIAL

## 2025-02-27 ENCOUNTER — ANESTHESIA (OUTPATIENT)
Dept: PERIOP | Facility: HOSPITAL | Age: 50
End: 2025-02-27
Payer: COMMERCIAL

## 2025-02-27 VITALS
BODY MASS INDEX: 36.94 KG/M2 | WEIGHT: 235.38 LBS | HEART RATE: 82 BPM | DIASTOLIC BLOOD PRESSURE: 77 MMHG | RESPIRATION RATE: 16 BRPM | TEMPERATURE: 98 F | SYSTOLIC BLOOD PRESSURE: 121 MMHG | OXYGEN SATURATION: 97 % | HEIGHT: 67 IN

## 2025-02-27 DIAGNOSIS — Z01.818 PRE-OP TESTING: ICD-10-CM

## 2025-02-27 DIAGNOSIS — K80.50 BILIARY COLIC: Primary | ICD-10-CM

## 2025-02-27 PROBLEM — E86.0 DEHYDRATION: Status: RESOLVED | Noted: 2025-01-17 | Resolved: 2025-02-27

## 2025-02-27 PROBLEM — R05.9 COUGH: Status: RESOLVED | Noted: 2020-04-20 | Resolved: 2025-02-27

## 2025-02-27 PROBLEM — Z20.822 SUSPECTED COVID-19 VIRUS INFECTION: Status: RESOLVED | Noted: 2020-04-20 | Resolved: 2025-02-27

## 2025-02-27 LAB — B-HCG UR QL: NEGATIVE

## 2025-02-27 PROCEDURE — 81025 URINE PREGNANCY TEST: CPT

## 2025-02-27 RX ORDER — POLYETHYLENE GLYCOL 3350 17 G/17G
17 POWDER, FOR SOLUTION ORAL DAILY PRN
Qty: 30 EACH | Refills: 0 | Status: SHIPPED | OUTPATIENT
Start: 2025-02-27 | End: 2025-03-07

## 2025-02-27 RX ORDER — OXYCODONE HYDROCHLORIDE 5 MG/1
5 TABLET ORAL EVERY 4 HOURS PRN
Qty: 10 TABLET | Refills: 0 | Status: SHIPPED | OUTPATIENT
Start: 2025-02-27 | End: 2025-03-10

## 2025-02-27 RX ORDER — POLYETHYLENE GLYCOL 3350 17 G/17G
17 POWDER, FOR SOLUTION ORAL DAILY PRN
Qty: 30 EACH | Refills: 0 | Status: SHIPPED | OUTPATIENT
Start: 2025-02-27 | End: 2025-03-10

## 2025-02-27 RX ORDER — SCOPOLAMINE 1 MG/3D
1 PATCH, EXTENDED RELEASE TRANSDERMAL ONCE
Status: DISCONTINUED | OUTPATIENT
Start: 2025-02-27 | End: 2025-02-27

## 2025-02-27 RX ORDER — ACETAMINOPHEN 500 MG
1000 TABLET ORAL ONCE
Status: DISCONTINUED | OUTPATIENT
Start: 2025-02-27 | End: 2025-02-27

## 2025-02-27 RX ORDER — INDOCYANINE GREEN AND WATER 25 MG
5 KIT INJECTION ONCE
Status: DISCONTINUED | OUTPATIENT
Start: 2025-02-27 | End: 2025-02-27

## 2025-02-27 RX ORDER — OXYCODONE HYDROCHLORIDE 5 MG/1
5 TABLET ORAL EVERY 4 HOURS PRN
Qty: 10 TABLET | Refills: 0 | Status: SHIPPED | OUTPATIENT
Start: 2025-02-27 | End: 2025-03-07

## 2025-02-27 RX ORDER — HEPARIN SODIUM 5000 [USP'U]/ML
5000 INJECTION, SOLUTION INTRAVENOUS; SUBCUTANEOUS ONCE
Status: DISCONTINUED | OUTPATIENT
Start: 2025-02-27 | End: 2025-02-27

## 2025-02-27 RX ORDER — ONDANSETRON 4 MG/1
4 TABLET, ORALLY DISINTEGRATING ORAL EVERY 8 HOURS PRN
Qty: 10 TABLET | Refills: 0 | Status: SHIPPED | OUTPATIENT
Start: 2025-02-27 | End: 2025-03-07

## 2025-02-27 RX ORDER — ONDANSETRON 4 MG/1
4 TABLET, ORALLY DISINTEGRATING ORAL EVERY 8 HOURS PRN
Qty: 10 TABLET | Refills: 0 | Status: SHIPPED | OUTPATIENT
Start: 2025-02-27 | End: 2025-03-10

## 2025-02-27 RX ORDER — SODIUM CHLORIDE, SODIUM LACTATE, POTASSIUM CHLORIDE, CALCIUM CHLORIDE 600; 310; 30; 20 MG/100ML; MG/100ML; MG/100ML; MG/100ML
INJECTION, SOLUTION INTRAVENOUS CONTINUOUS
Status: DISCONTINUED | OUTPATIENT
Start: 2025-02-27 | End: 2025-02-27

## 2025-02-27 NOTE — DISCHARGE INSTRUCTIONS
Cholecystectomy  Dr. Onelia Nuñez    MEDICATIONS  For post-operative pain control, the medications are usually oxycodone.  This is a narcotic and is best taken by starting with a half a tablet every four to six hours as needed.  If the patient does not feel they need the narcotics they shouldn’t take them.  If the pain is severe the patient may take a whole pill every six hours.  The patient can take tylenol 1g three times a day and ibuprofen 400-600mg in between up to 4 times a day as needed for pain as well.  The patient can take zofran 4mg every 6 hours as needed for nausea. The patient can also take miralax or colace as needed for constipation. Please ask your surgeon before resuming blood thinners such as aspirin, Plavix or Coumadin.  All other home medications may be resumed as scheduled. With severe cholecystitis, antibiotics will also be prescribed.  With antibiotics, please watch for rash, facial swelling or severe diarrhea.    DIET  The patient may resume a general diet immediately as tolerated.  The patient should eat in moderation and stick with foods the patient feels are easy to digest.  The patient may eat anything in small amounts but foods rich in dairy products, fatty foods or fried foods may cause an upset stomach for up to six weeks after surgery.  There should be no alcohol consumption in the immediate recovery time period or within six hours of taking narcotics.    WOUND CARE  The dressing is usually a dissolvable glue which protects the wound. The patient can take a shower starting the day after surgery, but please, no baths or soaking. Please do not put any creams or ointments on the surgical incisions. If the patient does have a top dressing with clear tape and gauze, this dressing may be removed in 2 days. Do not remove the steri-strips or butterfly tapes that are white and adherent to the skin.  The steri-strips will eventually peel up at the ends and at this point they may be removed.   This is usually seven to ten days after surgery.  When showering, soap can get on the wounds but do not scrub over the wounds.  No hair dye or chemicals of any kind should get in the wounds.  Avoid tub baths, swimming or sitting in a hot tub for two weeks.  If visible sutures or staples are present they will be removed in the office by the surgeon or nurse.  Most wounds will be closed with dissolving suture underneath the skin.  These sutures will dissolve on their own.  If a drain is present make sure the patient receives drain care instructions from the nurse prior to discharge.  Most patients will not have a drain.    ACTIVITY  Every day the patient should be up, showered and dressed.  Each day the patient should be up and around the house.  The patient should not lie in bed and should not stay in pajamas.  We count on the patient being up, coughing, walking and deep breathing to avoid pneumonia and blood clots in the legs.  Once a day the patient should get out of the house and go shopping, go to the mall, the [employer] store, the [employer] or a restaurant.  The patient may ride in a car but should not drive the car for at least one week.  Patients should be off narcotics for at least 8 hours prior to being a .  The average time off work is 10 to 14 days; most adults will be seeing the surgeon prior to returning to work.  Students will return to school within 1-5 days after discharge from the hospital but will be off gym, sports, and indoors for recess for one month.  Patients may go up and down stairs and lift up to five pounds but no bending, pushing or pulling.  Nothing called work or exercise until the follow up visit.  No ‘stair-master’, power walking, jogging or workouts until the follow up visit.  Patients should seek further activity limits at the time of their appointment.    APPOINTMENT  Please call our office for an appointment within five to ten days of discharge.  Any fever greater than 100.5,  chills, nausea, vomiting, or severe diarrhea please call our office.  If the wound turns red, hot, swollen, becomes increasingly painful, or drains pus call us immediately at [phone].  For life threatening emergencies call 911.  For non-emergent care please call our office after 8:30 a.m. Monday through Friday.  The number listed above is our office number; our phone automatically switches to our answering service if we are not there.  Please do not use the emergency room for non-urgent care.    Thank you for entrusting us with your care.  [facility]--General Surgery

## 2025-02-27 NOTE — PROGRESS NOTES
General Surgery progress note  Case canceled for insurance reasons  Discussed with patient via phone  Will reschedule her for the next availability    She is to call the office for any questions or concerns    Onelia Nuñez MD  [facility]  2/27/2025  11:14 AM

## 2025-02-28 ENCOUNTER — TELEPHONE (OUTPATIENT)
Facility: LOCATION | Age: 50
End: 2025-02-28

## 2025-02-28 DIAGNOSIS — K80.50 BILIARY COLIC: Primary | ICD-10-CM

## 2025-03-04 ENCOUNTER — TELEPHONE (OUTPATIENT)
Age: 50
End: 2025-03-04

## 2025-03-04 NOTE — TELEPHONE ENCOUNTER
Test(s) completed: CBC, TIBC, Ferritin    Results:low iron levels, needs IV iron     Plan:Dr Denson sent patient a [employer] message, patient has not read it yet. Left her a voicemail to schedule IV iron at her convenience.

## 2025-03-05 ENCOUNTER — TELEPHONE (OUTPATIENT)
Facility: LOCATION | Age: 50
End: 2025-03-05

## 2025-03-05 NOTE — TELEPHONE ENCOUNTER
Patient called back and said that it was the husbands employer issue and that was why she was not added. States that the issue should be resolved within the next day or so.

## 2025-03-05 NOTE — TELEPHONE ENCOUNTER
Spoke with patient about her insurance, states that she hasn't been able to get a hold of anyone from her insurance to discuss her eligibility. Patient said that she would be contacting them again today to see if they finalized her getting back onto her husbands insurance. Patient said that she would be calling back once she speaks with her insurance and is hoping for a verbal confirmation that she will be eligible and can have the surgery on 3/10.    As of 10:18am on 3/5, patient is still showing up as ineligible       [employer] [place] Eligibility    Insurance Name: [facility] Plan: [employer] [place]  Subscriber Name: MARLO TUCKER  Subscriber ID: [id number]  Subscriber : 10/09/1975  Subscriber Gender: FEMALE  Group/Policy Number: [id number]  Coverage Status: Not Eligible  Effective Date(s): 04/15/2024-2025    Passport Reference Number: [id number]

## 2025-03-06 ENCOUNTER — PATIENT MESSAGE (OUTPATIENT)
Facility: LOCATION | Age: 50
End: 2025-03-06

## 2025-03-07 ENCOUNTER — DOCUMENTATION ONLY (OUTPATIENT)
Facility: LOCATION | Age: 50
End: 2025-03-07

## 2025-03-07 ENCOUNTER — OFFICE VISIT (OUTPATIENT)
Dept: FAMILY MEDICINE CLINIC | Facility: CLINIC | Age: 50
End: 2025-03-07

## 2025-03-07 VITALS
DIASTOLIC BLOOD PRESSURE: 66 MMHG | SYSTOLIC BLOOD PRESSURE: 106 MMHG | HEART RATE: 66 BPM | BODY MASS INDEX: 36.98 KG/M2 | WEIGHT: 235.63 LBS | TEMPERATURE: 99 F | HEIGHT: 67 IN

## 2025-03-07 DIAGNOSIS — M25.562 CHRONIC PAIN OF BOTH KNEES: Primary | ICD-10-CM

## 2025-03-07 DIAGNOSIS — G89.29 CHRONIC PAIN OF BOTH KNEES: Primary | ICD-10-CM

## 2025-03-07 DIAGNOSIS — Z12.31 ENCOUNTER FOR SCREENING MAMMOGRAM FOR MALIGNANT NEOPLASM OF BREAST: ICD-10-CM

## 2025-03-07 DIAGNOSIS — Z00.00 WELL ADULT EXAM: ICD-10-CM

## 2025-03-07 DIAGNOSIS — Z12.11 SCREEN FOR COLON CANCER: ICD-10-CM

## 2025-03-07 DIAGNOSIS — M25.561 CHRONIC PAIN OF BOTH KNEES: Primary | ICD-10-CM

## 2025-03-07 DIAGNOSIS — N92.6 IRREGULAR MENSES: ICD-10-CM

## 2025-03-07 NOTE — PROGRESS NOTES
3/10/25//DR. LEVI CHAPMAN  ICD10: K80.50  CPT: 88316, 05734, 19402, 86598    PROCEDURE: LAP ISIS, POSS OPEN, W/ICG  AUTH: [employer] PER [employer], REF# [id number] 3/7/25    -SHERMAN FLORES 3/7/25

## 2025-03-07 NOTE — PROGRESS NOTES
HPI:    Patient ID: Carroll Best is a 49 year old female.    HPI  Pt presenting for routine physical exam. Denies any acute issues or recent illnesses. No significant chronic medical problems. Past medical/surgical history, family history, and social history were reviewed.     Pap 3/20/2024 neg/neg    Upcoming cholecystectomy     H/o fibroids, menorrhagia  H/o iron deficiency  Followed by Gyne, Hematology  Consider DANILO  Notes new irregular menses    H/o chronic knee pain  R>L, worse with Peloton use  Notes lateral crepitus and mild swelling, improved with stretching  Prior injections with relief    Mood stable, denies SH/SI/HI    Review of Systems   A comprehensive 10 point review of systems was completed.  Pertinent positives and negatives noted in the the HPI.       Current Outpatient Medications   Medication Sig Dispense Refill    Polyethylene Glycol 3350 17 g Oral Powd Pack Take 17 g by mouth daily as needed. 30 each 0    ibuprofen 800 MG Oral Tab Take 1 tablet (800 mg total) by mouth every 6 (six) hours as needed for Pain. 90 tablet 1    Multiple Vitamins-Minerals (MULTIVITAMIN GUMMIES ADULT OR) Take by mouth.      oxyCODONE 5 MG Oral Tab Take 1 tablet (5 mg total) by mouth every 4 (four) hours as needed for Pain. (Patient not taking: Reported on 3/7/2025) 10 tablet 0    ondansetron 4 MG Oral Tablet Dispersible Take 1 tablet (4 mg total) by mouth every 8 (eight) hours as needed for Nausea. (Patient not taking: Reported on 3/7/2025) 10 tablet 0    ergocalciferol 1.25 MG (51558 UT) Oral Cap Take 1 capsule (50,000 Units total) by mouth once a week. (Patient not taking: Reported on 3/7/2025) 12 capsule 1     Allergies:Allergies[1]   Vitals:    03/07/25 0820   BP: 106/66   Pulse: 66   Temp: 98.6 °F (37 °C)   TempSrc: Temporal   Weight: 235 lb 9.6 oz (106.9 kg)   Height: 5' 7\" (1.702 m)       Body mass index is 36.9 kg/m².   PHYSICAL EXAM:   Physical Exam  Vitals reviewed.   Constitutional:       General: She  is not in acute distress.     Appearance: Normal appearance. She is well-developed.   HENT:      Head: Normocephalic and atraumatic.      Right Ear: Tympanic membrane, ear canal and external ear normal.      Left Ear: Tympanic membrane, ear canal and external ear normal.   Eyes:      Conjunctiva/sclera: Conjunctivae normal.   Neck:      Thyroid: No thyroid mass or thyroid tenderness.   Cardiovascular:      Rate and Rhythm: Normal rate and regular rhythm.      Pulses: Normal pulses.      Heart sounds: Normal heart sounds, S1 normal and S2 normal. No murmur heard.  Pulmonary:      Effort: Pulmonary effort is normal. No respiratory distress.      Breath sounds: Normal breath sounds. No wheezing, rhonchi or rales.   Abdominal:      General: Bowel sounds are normal.      Palpations: Abdomen is soft.      Tenderness: There is no abdominal tenderness. There is no guarding or rebound.      Comments: Lower abd fullness likely fibroid   Musculoskeletal:      Cervical back: Normal range of motion and neck supple. No muscular tenderness.      Right lower leg: No edema.      Left lower leg: No edema.   Lymphadenopathy:      Cervical: No cervical adenopathy.   Skin:     General: Skin is warm and dry.      Coloration: Skin is not jaundiced.   Neurological:      General: No focal deficit present.      Mental Status: She is alert and oriented to person, place, and time. Mental status is at baseline.   Psychiatric:         Attention and Perception: Attention normal.         Mood and Affect: Mood normal.         Behavior: Behavior normal. Behavior is cooperative.         Cognition and Memory: Cognition normal.             ASSESSMENT/PLAN:   1. Well adult exam  Discussed preventative screenings  - Pap 3/2024  - mammogram ordered  - Cologuard kit ordered May 2024 -- encouraged to complete prior to expiration  - recent labs reviewed -- will check labs as below  - encouraged to continue diet/exercise for overall wellness  - follow-up with  eye doctor annually  - follow-up with dentist every 6 months  - return yearly for physicals  - annual flu shot  - tetanus booster every 10yrs  - TSH W Reflex To Free T4 [E]; Future  - Lipid Panel; Future  - Hemoglobin A1C; Future  - Vitamin D [E]; Future    2. Chronic pain of both knees  Last XR 2020  Will recheck for surveillance  - continue gentle stretching/strengthening exercises  - encouraged to increase hydration and rest as able  - consider PMR follow-up  - to call with any questions or concerns  - XR KNEE, COMPLETE (4 OR MORE VIEWS), RIGHT (CPT=73564); Future  - XR KNEE, COMPLETE (4 OR MORE VIEWS), LEFT (CPT=73564); Future  - Physiatry Referral - In Network    3. Encounter for screening mammogram for malignant neoplasm of breast  - [facility] CIRILO 2D+3D SCREENING BILAT (CPT=77067/52168); Future    4. Screen for colon cancer  Cologuard as above    5. Irregular menses  Discussed suspected perimenopausal component  Due to h/o PE/DVT, not candidate for HRT  Continue to monitor  Consider Gyne follow-up for definitive management    Pt verbalized understanding and agrees with plan.    Orders Placed This Encounter   Procedures    TSH W Reflex To Free T4 [E]    Lipid Panel    Hemoglobin A1C    Vitamin D [E]       Meds This Visit:  Requested Prescriptions      No prescriptions requested or ordered in this encounter       Imaging & Referrals:  PHYSIATRY - INTERNAL  XR KNEE, COMPLETE (4 OR MORE VIEWS), RIGHT (CPT=73564)  XR KNEE, COMPLETE (4 OR MORE VIEWS), LEFT (CPT=73564)  [facility] CIRILO 2D+3D SCREENING BILAT (CPT=77067/88449)         ID#[id number]       [1] No Known Allergies

## 2025-03-10 ENCOUNTER — ANESTHESIA EVENT (OUTPATIENT)
Dept: SURGERY | Facility: HOSPITAL | Age: 50
End: 2025-03-10
Payer: COMMERCIAL

## 2025-03-10 ENCOUNTER — ANESTHESIA (OUTPATIENT)
Dept: SURGERY | Facility: HOSPITAL | Age: 50
End: 2025-03-10
Payer: COMMERCIAL

## 2025-03-10 ENCOUNTER — HOSPITAL ENCOUNTER (OUTPATIENT)
Facility: HOSPITAL | Age: 50
Setting detail: HOSPITAL OUTPATIENT SURGERY
Discharge: HOME | End: 2025-03-10
Attending: STUDENT IN AN ORGANIZED HEALTH CARE EDUCATION/TRAINING PROGRAM | Admitting: STUDENT IN AN ORGANIZED HEALTH CARE EDUCATION/TRAINING PROGRAM
Payer: COMMERCIAL

## 2025-03-10 VITALS
TEMPERATURE: 97 F | OXYGEN SATURATION: 98 % | WEIGHT: 232.56 LBS | SYSTOLIC BLOOD PRESSURE: 121 MMHG | HEIGHT: 67 IN | RESPIRATION RATE: 18 BRPM | DIASTOLIC BLOOD PRESSURE: 83 MMHG | BODY MASS INDEX: 36.5 KG/M2 | HEART RATE: 86 BPM

## 2025-03-10 DIAGNOSIS — K80.50 BILIARY COLIC: Primary | ICD-10-CM

## 2025-03-10 LAB — B-HCG UR QL: NEGATIVE

## 2025-03-10 PROCEDURE — 47562 LAPAROSCOPIC CHOLECYSTECTOMY: CPT

## 2025-03-10 PROCEDURE — 47562 LAPAROSCOPIC CHOLECYSTECTOMY: CPT | Performed by: STUDENT IN AN ORGANIZED HEALTH CARE EDUCATION/TRAINING PROGRAM

## 2025-03-10 PROCEDURE — 0FT44ZZ RESECTION OF GALLBLADDER, PERCUTANEOUS ENDOSCOPIC APPROACH: ICD-10-PCS | Performed by: STUDENT IN AN ORGANIZED HEALTH CARE EDUCATION/TRAINING PROGRAM

## 2025-03-10 RX ORDER — MIDAZOLAM HYDROCHLORIDE 1 MG/ML
1 INJECTION INTRAMUSCULAR; INTRAVENOUS EVERY 5 MIN PRN
Status: DISCONTINUED | OUTPATIENT
Start: 2025-03-10 | End: 2025-03-10

## 2025-03-10 RX ORDER — HYDROCODONE BITARTRATE AND ACETAMINOPHEN 5; 325 MG/1; MG/1
1 TABLET ORAL ONCE AS NEEDED
Status: COMPLETED | OUTPATIENT
Start: 2025-03-10 | End: 2025-03-10

## 2025-03-10 RX ORDER — NALOXONE HYDROCHLORIDE 0.4 MG/ML
0.08 INJECTION, SOLUTION INTRAMUSCULAR; INTRAVENOUS; SUBCUTANEOUS AS NEEDED
Status: DISCONTINUED | OUTPATIENT
Start: 2025-03-10 | End: 2025-03-10

## 2025-03-10 RX ORDER — LIDOCAINE HYDROCHLORIDE 10 MG/ML
INJECTION, SOLUTION EPIDURAL; INFILTRATION; INTRACAUDAL; PERINEURAL AS NEEDED
Status: DISCONTINUED | OUTPATIENT
Start: 2025-03-10 | End: 2025-03-11 | Stop reason: SURG

## 2025-03-10 RX ORDER — ONDANSETRON 4 MG/1
4 TABLET, ORALLY DISINTEGRATING ORAL EVERY 8 HOURS PRN
Qty: 10 TABLET | Refills: 0 | Status: SHIPPED | OUTPATIENT
Start: 2025-03-10

## 2025-03-10 RX ORDER — MEPERIDINE HYDROCHLORIDE 25 MG/ML
INJECTION INTRAMUSCULAR; INTRAVENOUS; SUBCUTANEOUS
Status: COMPLETED
Start: 2025-03-10 | End: 2025-03-10

## 2025-03-10 RX ORDER — ONDANSETRON 2 MG/ML
INJECTION INTRAMUSCULAR; INTRAVENOUS AS NEEDED
Status: DISCONTINUED | OUTPATIENT
Start: 2025-03-10 | End: 2025-03-11 | Stop reason: SURG

## 2025-03-10 RX ORDER — HYDROMORPHONE HYDROCHLORIDE 1 MG/ML
0.2 INJECTION, SOLUTION INTRAMUSCULAR; INTRAVENOUS; SUBCUTANEOUS EVERY 5 MIN PRN
Status: DISCONTINUED | OUTPATIENT
Start: 2025-03-10 | End: 2025-03-10

## 2025-03-10 RX ORDER — DEXAMETHASONE SODIUM PHOSPHATE 4 MG/ML
VIAL (ML) INJECTION AS NEEDED
Status: DISCONTINUED | OUTPATIENT
Start: 2025-03-10 | End: 2025-03-11 | Stop reason: SURG

## 2025-03-10 RX ORDER — POLYETHYLENE GLYCOL 3350 17 G/17G
17 POWDER, FOR SOLUTION ORAL DAILY PRN
Qty: 30 EACH | Refills: 0 | Status: SHIPPED | OUTPATIENT
Start: 2025-03-10

## 2025-03-10 RX ORDER — PROCHLORPERAZINE EDISYLATE 5 MG/ML
5 INJECTION INTRAMUSCULAR; INTRAVENOUS EVERY 8 HOURS PRN
Status: DISCONTINUED | OUTPATIENT
Start: 2025-03-10 | End: 2025-03-10

## 2025-03-10 RX ORDER — ROCURONIUM BROMIDE 10 MG/ML
INJECTION, SOLUTION INTRAVENOUS AS NEEDED
Status: DISCONTINUED | OUTPATIENT
Start: 2025-03-10 | End: 2025-03-11 | Stop reason: SURG

## 2025-03-10 RX ORDER — HYDROMORPHONE HYDROCHLORIDE 1 MG/ML
INJECTION, SOLUTION INTRAMUSCULAR; INTRAVENOUS; SUBCUTANEOUS
Status: COMPLETED
Start: 2025-03-10 | End: 2025-03-10

## 2025-03-10 RX ORDER — ACETAMINOPHEN 500 MG
1000 TABLET ORAL ONCE AS NEEDED
Status: COMPLETED | OUTPATIENT
Start: 2025-03-10 | End: 2025-03-10

## 2025-03-10 RX ORDER — MIDAZOLAM HYDROCHLORIDE 1 MG/ML
INJECTION INTRAMUSCULAR; INTRAVENOUS
Status: COMPLETED
Start: 2025-03-10 | End: 2025-03-10

## 2025-03-10 RX ORDER — ONDANSETRON 2 MG/ML
INJECTION INTRAMUSCULAR; INTRAVENOUS
Status: COMPLETED
Start: 2025-03-10 | End: 2025-03-10

## 2025-03-10 RX ORDER — ACETAMINOPHEN 500 MG
1000 TABLET ORAL ONCE
Status: DISCONTINUED | OUTPATIENT
Start: 2025-03-10 | End: 2025-03-10 | Stop reason: HOSPADM

## 2025-03-10 RX ORDER — HYDROMORPHONE HYDROCHLORIDE 1 MG/ML
0.6 INJECTION, SOLUTION INTRAMUSCULAR; INTRAVENOUS; SUBCUTANEOUS EVERY 5 MIN PRN
Status: DISCONTINUED | OUTPATIENT
Start: 2025-03-10 | End: 2025-03-10

## 2025-03-10 RX ORDER — KETOROLAC TROMETHAMINE 30 MG/ML
INJECTION, SOLUTION INTRAMUSCULAR; INTRAVENOUS AS NEEDED
Status: DISCONTINUED | OUTPATIENT
Start: 2025-03-10 | End: 2025-03-11 | Stop reason: SURG

## 2025-03-10 RX ORDER — OXYCODONE HYDROCHLORIDE 5 MG/1
5 TABLET ORAL EVERY 4 HOURS PRN
Qty: 15 TABLET | Refills: 0 | Status: SHIPPED | OUTPATIENT
Start: 2025-03-10

## 2025-03-10 RX ORDER — DIPHENHYDRAMINE HYDROCHLORIDE 50 MG/ML
12.5 INJECTION INTRAMUSCULAR; INTRAVENOUS AS NEEDED
Status: DISCONTINUED | OUTPATIENT
Start: 2025-03-10 | End: 2025-03-10

## 2025-03-10 RX ORDER — SCOPOLAMINE 1 MG/3D
1 PATCH, EXTENDED RELEASE TRANSDERMAL ONCE
Status: DISCONTINUED | OUTPATIENT
Start: 2025-03-10 | End: 2025-03-10 | Stop reason: HOSPADM

## 2025-03-10 RX ORDER — BUPIVACAINE HYDROCHLORIDE 2.5 MG/ML
INJECTION, SOLUTION EPIDURAL; INFILTRATION; INTRACAUDAL AS NEEDED
Status: DISCONTINUED | OUTPATIENT
Start: 2025-03-10 | End: 2025-03-10 | Stop reason: HOSPADM

## 2025-03-10 RX ORDER — SODIUM CHLORIDE, SODIUM LACTATE, POTASSIUM CHLORIDE, CALCIUM CHLORIDE 600; 310; 30; 20 MG/100ML; MG/100ML; MG/100ML; MG/100ML
INJECTION, SOLUTION INTRAVENOUS CONTINUOUS
Status: DISCONTINUED | OUTPATIENT
Start: 2025-03-10 | End: 2025-03-10

## 2025-03-10 RX ORDER — HYDROMORPHONE HYDROCHLORIDE 1 MG/ML
0.4 INJECTION, SOLUTION INTRAMUSCULAR; INTRAVENOUS; SUBCUTANEOUS EVERY 5 MIN PRN
Status: DISCONTINUED | OUTPATIENT
Start: 2025-03-10 | End: 2025-03-10

## 2025-03-10 RX ORDER — INDOCYANINE GREEN AND WATER 25 MG
5 KIT INJECTION ONCE
Status: COMPLETED | OUTPATIENT
Start: 2025-03-10 | End: 2025-03-10

## 2025-03-10 RX ORDER — HYDROCODONE BITARTRATE AND ACETAMINOPHEN 5; 325 MG/1; MG/1
2 TABLET ORAL ONCE AS NEEDED
Status: COMPLETED | OUTPATIENT
Start: 2025-03-10 | End: 2025-03-10

## 2025-03-10 RX ORDER — HYDROCODONE BITARTRATE AND ACETAMINOPHEN 5; 325 MG/1; MG/1
TABLET ORAL
Status: COMPLETED
Start: 2025-03-10 | End: 2025-03-10

## 2025-03-10 RX ORDER — MEPERIDINE HYDROCHLORIDE 25 MG/ML
12.5 INJECTION INTRAMUSCULAR; INTRAVENOUS; SUBCUTANEOUS AS NEEDED
Status: DISCONTINUED | OUTPATIENT
Start: 2025-03-10 | End: 2025-03-10

## 2025-03-10 RX ORDER — MIDAZOLAM HYDROCHLORIDE 1 MG/ML
INJECTION INTRAMUSCULAR; INTRAVENOUS AS NEEDED
Status: DISCONTINUED | OUTPATIENT
Start: 2025-03-10 | End: 2025-03-11 | Stop reason: SURG

## 2025-03-10 RX ORDER — ONDANSETRON 2 MG/ML
4 INJECTION INTRAMUSCULAR; INTRAVENOUS EVERY 6 HOURS PRN
Status: DISCONTINUED | OUTPATIENT
Start: 2025-03-10 | End: 2025-03-10

## 2025-03-10 RX ORDER — LIDOCAINE HYDROCHLORIDE AND EPINEPHRINE 10; 10 MG/ML; UG/ML
INJECTION, SOLUTION INFILTRATION; PERINEURAL AS NEEDED
Status: DISCONTINUED | OUTPATIENT
Start: 2025-03-10 | End: 2025-03-10 | Stop reason: HOSPADM

## 2025-03-10 RX ORDER — INDOCYANINE GREEN AND WATER 25 MG
KIT INJECTION
Status: COMPLETED
Start: 2025-03-10 | End: 2025-03-10

## 2025-03-10 RX ADMIN — ROCURONIUM BROMIDE 10 MG: 10 INJECTION, SOLUTION INTRAVENOUS at 14:13:00

## 2025-03-10 RX ADMIN — LIDOCAINE HYDROCHLORIDE 50 MG: 10 INJECTION, SOLUTION EPIDURAL; INFILTRATION; INTRACAUDAL; PERINEURAL at 13:37:00

## 2025-03-10 RX ADMIN — ONDANSETRON 4 MG: 2 INJECTION INTRAMUSCULAR; INTRAVENOUS at 14:38:00

## 2025-03-10 RX ADMIN — ROCURONIUM BROMIDE 70 MG: 10 INJECTION, SOLUTION INTRAVENOUS at 13:39:00

## 2025-03-10 RX ADMIN — SODIUM CHLORIDE, SODIUM LACTATE, POTASSIUM CHLORIDE, CALCIUM CHLORIDE: 600; 310; 30; 20 INJECTION, SOLUTION INTRAVENOUS at 13:31:00

## 2025-03-10 RX ADMIN — DEXAMETHASONE SODIUM PHOSPHATE 4 MG: 4 MG/ML VIAL (ML) INJECTION at 13:47:00

## 2025-03-10 RX ADMIN — MIDAZOLAM HYDROCHLORIDE 2 MG: 1 INJECTION INTRAMUSCULAR; INTRAVENOUS at 13:31:00

## 2025-03-10 RX ADMIN — KETOROLAC TROMETHAMINE 30 MG: 30 INJECTION, SOLUTION INTRAMUSCULAR; INTRAVENOUS at 14:38:00

## 2025-03-10 NOTE — OPERATIVE REPORT
OPERATIVE NOTE    Carroll Best Location: OR   CSN [id number] MRN [id number]   Admission Date 3/10/2025 Operation Date 3/10/2025   Attending Physician Onelia Nuñez MD Operating Physician Onelia Nuñez MD     PREOPERATIVE DIAGNOSIS  Biliary colic    POSTOPERATIVE DIAGNOSIS  Biliary colic  Umbilical hernia    PROCEDURE PERFORMED  Laparoscopic cholecystectomy with ICG guidance  Transversus abdominis plane block  Primary umbilical hernia repair    SURGEON  Onelia Nuñez MD    ASSISTANTS  BILL Quiles her assistance was necessary for the consult of this case especially in the careful dissection around the hilum and gallbladder removal    ANESTHESIA  General    INDICATIONS   This is a 49-year-old woman who was previously seen in the hospital for right upper quadrant abdominal pain.  Workup was significant for cholelithiasis.  Patient was diagnosed with biliary colic.  She continued to have symptoms.  Cholecystectomy was indicated.  The procedure and all the risks were discussed with the patient and she consented.    FINDINGS  Soft and uninflamed gallbladder, successful identification of the common and cystic duct via ICG guidance, umbilical hernia with defect approximately 1.5 cm    FINDINGS/DESCRIPTION OF PROCEDURE  After informed consent, patient was brought to the operating room and placed in supine position with arms out. Bilateral SCDs were placed and pre-operative antibiotics administered. Anesthesia was induced without any complication. Patient was prepped and draped in the usual sterile fashion. Time out was performed confirming patient, procedure, and site.    The Veress needle was used to gain pneumoperitoneum. Using blade, a curvilinear supraumbilical incision was made.  Upon palpation, patient had small umbilical hernia.  Using hemostats, umbilical stalk was circumferentially dissected to isolate the umbilical stalk.  Once the stalk was isolated, the hernia was resected off of the  Patient Education: umbilicus.  The hernia sac was entered and contained preperitoneal fat.  The defect was approximately 1.5 centimeter.  Edges of the hernia sac at the level of the fascia were resected.  Veress needle was inserted into the hernia defect with audible clicks. On aspiration, there was no bowel contents.  Saline flowed easily confirming presence in the abdomen. Pneumoperitoneum was created with CO2. An 11mm port was inserted. Upon entrance, no injury was noted to omentum or bowel at site of entrance.  Three 5mm ports were placed under direct visualization; two on the right lateral aspect of the abdomen and the third subxiphoid, triangulating toward the location of the gallbladder. Transversus abdominis plane block was performed.    Upon inspection of the abdomen, gallbladder was soft and uninflamed.  Gallbladder was grasped and retracted above the liver.  There were some omental adhesions which were taken down bluntly with cautery.  Dissection was started at the infundibulum. Cystic duct and artery were identified, clearly seeing the critical view of safety.  Patient was administered indocyanine green dye preoperatively.  Indocyanine green dye was noted in the gallbladder, cystic duct, and into the common bile duct.  Common bile duct did not look distended nor did the cystic duct. The cystic duct and artery were then clipped and divided. The gallbladder was then resected from the fossa and placed in endocatch bag.  There was some spillage of bile.  The gallbladder fossa was irrigated until clear. Hemostasis achieved. Gallbladder was extracted from abdominal cavity and sent for pathology.  Pneumoperitoneum was evacuated.  Fascia at the umbilicus was closed with 0 Ethibond suture.  The umbilicus was tacked down to the underlying fascia with a 2-0 Vicryl.  Skin at the umbilicus was reapproximated with 3-0 Vicryl deep dermals.  Skin incisions were closed with 4-o monocryl. Incisions were washed and dressed with dermabond.      At the end of the case, all counts were correct. Patient tolerated procedure well. Patient was awakened and transferred to PACU in a hemodynamically stable condition.     SPECIMENS REMOVED  Gallbladder    ESTIMATED BLOOD LOSS  5 ml    COMPLICATIONS  None    Onelia Nuñez MD

## 2025-03-10 NOTE — ANESTHESIA PREPROCEDURE EVALUATION
PRE-OP EVALUATION    Patient Name: Carroll Best    Admit Diagnosis: Biliary colic [K80.50]    Pre-op Diagnosis: Biliary colic [K80.50]    LAPAROSCOPIC CHOLECYSTECTOMY POSSIBLE OPEN WITH INJECTION OF INDOCYANINE GREEN    Anesthesia Procedure: LAPAROSCOPIC CHOLECYSTECTOMY POSSIBLE OPEN WITH INJECTION OF INDOCYANINE GREEN    Surgeons and Role:     * Onelia Nuñez MD - Primary    Pre-op vitals reviewed.  Temp: 98 °F (36.7 °C)  Pulse: 90  Resp: 16  BP: 116/72  SpO2: 99 %  Body mass index is 36.43 kg/m².    Current medications reviewed.  Hospital Medications:   [Transfer Hold] acetaminophen (Tylenol Extra Strength) tab 1,000 mg  1,000 mg Oral Once    [Transfer Hold] scopolamine (Transderm-Scop) 1 MG/3DAYS patch 1 patch  1 patch Transdermal Once    lactated ringers infusion   Intravenous Continuous    ceFAZolin (Ancef) 2g in 10mL IV syringe premix  2 g Intravenous Once    [COMPLETED] indocyanine green (IC-Green) injection 5 mg  5 mg Intravenous Once    ceFAZolin (Ancef) 2 g/10mL IV syringe premix           Outpatient Medications:   Prescriptions Prior to Admission[1]    Allergies: Patient has no known allergies.      Anesthesia Evaluation    Patient summary reviewed.    Anesthetic Complications  (-) history of anesthetic complications         GI/Hepatic/Renal    Negative GI/hepatic/renal ROS.                             Cardiovascular                (+) obesity                                       Endo/Other               (+) anemia                   Pulmonary    Negative pulmonary ROS.                       Neuro/Psych                 (+) neuromuscular disease                     Past Surgical History:   Procedure Laterality Date    Needle biopsy left  2016    benign    Treat ectopic preg,non remval  2006     Social History     Socioeconomic History    Marital status:    Tobacco Use    Smoking status: Never    Smokeless tobacco: Never   Vaping Use    Vaping status: Never Used   Substance and Sexual  Activity    Alcohol use: No     Alcohol/week: 0.0 standard drinks of alcohol    Drug use: No   Other Topics Concern    Caffeine Concern No     History   Drug Use No     Available pre-op labs reviewed.  Lab Results   Component Value Date    WBC 7.9 02/25/2025    RBC 3.53 (L) 02/25/2025    HGB 10.0 (L) 02/25/2025    HCT 30.9 (L) 02/25/2025    MCV 87.5 02/25/2025    MCH 28.3 02/25/2025    MCHC 32.4 02/25/2025    RDW 13.4 02/25/2025    .0 02/25/2025     Lab Results   Component Value Date     01/16/2025    K 3.8 01/16/2025     01/16/2025    CO2 26.0 01/16/2025    BUN 14 01/16/2025    CREATSERUM 1.17 (H) 01/16/2025     (H) 01/16/2025    CA 9.9 01/16/2025            Airway      Mallampati: III  Mouth opening: 3 FB  TM distance: 4 - 6 cm   Cardiovascular             Dental             Pulmonary                     Other findings              ASA: 2   Plan: general  NPO status verified and     Post-procedure pain management plan discussed with surgeon and patient.    Comment: GETA/LMA discussed in detail.  Risk of complications discussed including but not limited to sore throat, cough, PONV discussed. Also, discussed risks including dental injury particularly on any weakened, treated or diseased teeth & pt wishes to proceed  All questions answered.    Plan/risks discussed with: patient                Present on Admission:  **None**             [1]   Medications Prior to Admission   Medication Sig Dispense Refill Last Dose/Taking    ibuprofen 800 MG Oral Tab Take 1 tablet (800 mg total) by mouth every 6 (six) hours as needed for Pain. 90 tablet 1 Past Week    Multiple Vitamins-Minerals (MULTIVITAMIN GUMMIES ADULT OR) Take by mouth.   Past Week    ergocalciferol 1.25 MG (65409 UT) Oral Cap Take 1 capsule (50,000 Units total) by mouth once a week. (Patient not taking: Reported on 3/7/2025) 12 capsule 1 Taking    Polyethylene Glycol 3350 17 g Oral Powd Pack Take 17 g by mouth daily as needed. 30 each 0      oxyCODONE 5 MG Oral Tab Take 1 tablet (5 mg total) by mouth every 4 (four) hours as needed for Pain. (Patient not taking: Reported on 3/7/2025) 10 tablet 0     ondansetron 4 MG Oral Tablet Dispersible Take 1 tablet (4 mg total) by mouth every 8 (eight) hours as needed for Nausea. (Patient not taking: Reported on 3/7/2025) 10 tablet 0

## 2025-03-10 NOTE — DISCHARGE INSTRUCTIONS
Cholecystectomy  Dr. Onelia Nuñez    MEDICATIONS  For post-operative pain control, the medications are usually oxycodone.  This is a narcotic and is best taken by starting with a half a tablet every four to six hours as needed.  If the patient does not feel they need the narcotics they shouldn’t take them.  If the pain is severe the patient may take a whole pill every six hours.  The patient can take tylenol 1g three times a day and ibuprofen 400-600mg in between up to 4 times a day as needed for pain as well.  The patient can take zofran 4mg every 6 hours as needed for nausea. The patient can also take miralax or colace as needed for constipation. Please ask your surgeon before resuming blood thinners such as aspirin, Plavix or Coumadin.  All other home medications may be resumed as scheduled. With severe cholecystitis, antibiotics will also be prescribed.  With antibiotics, please watch for rash, facial swelling or severe diarrhea.    DIET  The patient may resume a general diet immediately as tolerated.  The patient should eat in moderation and stick with foods the patient feels are easy to digest.  The patient may eat anything in small amounts but foods rich in dairy products, fatty foods or fried foods may cause an upset stomach for up to six weeks after surgery.  There should be no alcohol consumption in the immediate recovery time period or within six hours of taking narcotics.    WOUND CARE  The dressing is usually a dissolvable glue which protects the wound. The patient can take a shower starting the day after surgery, but please, no baths or soaking. Please do not put any creams or ointments on the surgical incisions. If the patient does have a top dressing with clear tape and gauze, this dressing may be removed in 2 days. Do not remove the steri-strips or butterfly tapes that are white and adherent to the skin.  The steri-strips will eventually peel up at the ends and at this point they may be removed.   This is usually seven to ten days after surgery.  When showering, soap can get on the wounds but do not scrub over the wounds.  No hair dye or chemicals of any kind should get in the wounds.  Avoid tub baths, swimming or sitting in a hot tub for two weeks.  If visible sutures or staples are present they will be removed in the office by the surgeon or nurse.  Most wounds will be closed with dissolving suture underneath the skin.  These sutures will dissolve on their own.  If a drain is present make sure the patient receives drain care instructions from the nurse prior to discharge.  Most patients will not have a drain.    ACTIVITY  Every day the patient should be up, showered and dressed.  Each day the patient should be up and around the house.  The patient should not lie in bed and should not stay in pajamas.  We count on the patient being up, coughing, walking and deep breathing to avoid pneumonia and blood clots in the legs.  Once a day the patient should get out of the house and go shopping, go to the mall, the [employer] store, the [employer] or a restaurant.  The patient may ride in a car but should not drive the car for at least one week.  Patients should be off narcotics for at least 8 hours prior to being a .  The average time off work is 10 to 14 days; most adults will be seeing the surgeon prior to returning to work.  Students will return to school within 1-5 days after discharge from the hospital but will be off gym, sports, and indoors for recess for one month.  Patients may go up and down stairs and lift up to five pounds but no bending, pushing or pulling.  Nothing called work or exercise until the follow up visit.  No ‘stair-master’, power walking, jogging or workouts until the follow up visit.  Patients should seek further activity limits at the time of their appointment.    APPOINTMENT  Please call our office for an appointment within 2 weeks of discharge. Any fever greater than 100.5, chills,  nausea, vomiting, or severe diarrhea please call our office.  If the wound turns red, hot, swollen, becomes increasingly painful, or drains pus call us immediately at [phone].  For life threatening emergencies call 911.  For non-emergent care please call our office after 8:30 a.m. Monday through Friday.  The number listed above is our office number; our phone automatically switches to our answering service if we are not there.  Please do not use the emergency room for non-urgent care.    Thank you for entrusting us with your care.  [facility]--General Surgery      You received a drug called Toradol which is an Anti Inflammatory at: 2:40pm  If you are allowed to take Anti inflammatories:    Do not take any Anti Inflammatory like Motrin, Aleve or Ibuprofen until after: 8:40pm  Please report any suspected allergic reactions or bleeding issues to your doctor

## 2025-03-10 NOTE — ANESTHESIA PROCEDURE NOTES
Airway  Date/Time: 3/10/2025 1:41 PM  Urgency: elective    Airway not difficult    General Information and Staff    Patient location during procedure: OR  Anesthesiologist: Owen Jaquez MD  Performed: anesthesiologist   Performed by: Owen Jaquez MD  Authorized by: Owen Jaquez MD      Indications and Patient Condition  Indications for airway management: anesthesia  Sedation level: deep  Preoxygenated: yes  Patient position: sniffing  Mask difficulty assessment: 1 - vent by mask    Final Airway Details  Final airway type: endotracheal airway      Successful airway: ETT  Cuffed: yes   Successful intubation technique: Video laryngoscopy  Endotracheal tube insertion site: oral  Blade: GlideScope  Blade size: #3  ETT size (mm): 7.0    Placement verified by: capnometry   Cuff volume (mL): 8  Measured from: lips  ETT to lips (cm): 21  Number of attempts at approach: 1    Additional Comments  atraumatic

## 2025-03-10 NOTE — H&P
[facility]  Report of Surgical History and Physical Exam    Carroll Best Patient Status:  Hospital Outpatient Surgery    10/9/1975 MRN [id number]   Location [facility] PRE OP HOLDING Attending Onelia Nuñez MD   Hosp Day # 0 PCP Ann Lacy MD     Chief Complaint: Biliary colic    History of Present Illness:  Carroll Best is a a(n) 49 year old female who presents today for elective cholecystectomy.  Since last visit, patient denies any new issues.      History:  Past Medical History:    Anemia    iron deficiency    Deep vein thrombosis (HCC)    History of blood transfusion    3 years ago    Lipid screening    Osteoarthritis    Mild in kneeds    Pulmonary embolism (HCC)    3 years ago.    Visual impairment    Glasses       Past Surgical History:   Procedure Laterality Date    Needle biopsy left  2016    benign    Treat ectopic preg,non remval         Family History   Problem Relation Age of Onset    Arthritis Father         osteoarthritis        reports that she has never smoked. She has never used smokeless tobacco. She reports that she does not drink alcohol and does not use drugs.      Allergies:  Allergies[1]      Medications:    Current Facility-Administered Medications:     [Transfer Hold] acetaminophen (Tylenol Extra Strength) tab 1,000 mg, 1,000 mg, Oral, Once    [Transfer Hold] scopolamine (Transderm-Scop) 1 MG/3DAYS patch 1 patch, 1 patch, Transdermal, Once    lactated ringers infusion, , Intravenous, Continuous    ceFAZolin (Ancef) 2g in 10mL IV syringe premix, 2 g, Intravenous, Once    ceFAZolin (Ancef) 2 g/10mL IV syringe premix, , ,       Review of Systems:  The review of systems was negative other than the above listed HPI and past medical history including the HEENT, Heart, Lungs, GI, , Neuro, Musculoskeletal, Hematologic, Endocrine and Psych.       Physical Exam:  Blood pressure 116/72, pulse 90, temperature 98 °F (36.7 °C), temperature source Temporal,  resp. rate 16, height 67\", weight 232 lb 9.4 oz (105.5 kg), last menstrual period 02/18/2025, SpO2 99%.  General: Alert, orientated x3.  Cooperative.  No apparent distress.  HEENT: Exam is unremarkable.  Without scleral icterus.  Mucous membranes are moist. EOM are WNL.  Neck: No tenderness to palpitation.  Full range of motion to flexion and extension, lateral rotation and lateral flexion of cervical spine.  No JVD. Supple.   Lungs: Bilateral chest rise. Good excursion of the diaphragms. No secondary use of accessory respiratory musculature.  Cardiac: Regular rate and rhythm. No murmur.  Abdomen: Soft, nontender, nondistended  Extremities:  No lower extremity edema noted.  Without clubbing or cyanosis.  2+ pulses x4  Skin: Normal texture and turgor.      Laboratory Data and Relevant Imaging:  No results for input(s): \"RBC\", \"HGB\", \"HCT\", \"MCV\", \"MCH\", \"MCHC\", \"RDW\", \"NEPRELIM\", \"WBC\", \"PLT\" in the last 168 hours.    No results for input(s): \"GLU\", \"BUN\", \"CREATSERUM\", \"GFRAA\", \"GFRNAA\", \"CA\", \"ALB\", \"NA\", \"K\", \"CL\", \"CO2\", \"ALKPHO\", \"AST\", \"ALT\", \"BILT\", \"TP\" in the last 168 hours.      No results for input(s): \"PTP\", \"INR\", \"PTT\" in the last 168 hours.    Imaging  None      Impression/Plan:  Patient Active Problem List   Diagnosis    Knee pain, right anterior    Cervicalgia    Vertigo    Carpal tunnel syndrome    Vitamin D deficiency    Obesity (BMI 35.0-39.9 without comorbidity)    Primary osteoarthritis of knees, bilateral    Chondromalacia, patella, right    Hypokalemia    Symptomatic anemia    Menorrhagia with regular cycle    Uterine leiomyoma, unspecified location    Exertional chest pain    Abnormal EKG    Multiple subsegmental pulmonary emboli without acute cor pulmonale (HCC)    Mass of left breast, unspecified quadrant    Leg DVT (deep venous thromboembolism), acute, right (HCC)    Iron deficiency anemia secondary to blood loss (chronic)    Anemia    Hyperglycemia    Biliary colic    Intractable pain        49 year old female with biliary colic    Will proceed with laparoscopic cholecystectomy with ICG  All questions answered  Patient will follow-up in 2 weeks    Thank you for letting me participate in the care of this patient    Onelia Nuñez MD  3/10/2025  12:23 PM         [1] No Known Allergies

## 2025-03-11 NOTE — ANESTHESIA POSTPROCEDURE EVALUATION
[facility]    Carroll Best Patient Status:  Hospital Outpatient Surgery   Age/Gender 49 year old female MRN [id number]   Location [facility] PERIOPERATIVE SERVICE Attending No att. providers found   Hosp Day # 0 PCP Ann Lacy MD       Anesthesia Post-op Note    LAPAROSCOPIC CHOLECYSTECTOMY WITH INJECTION OF INDOCYANINE GREEN, UMBILICAL HERNIA REPAIR    Procedure Summary       Date: 03/10/25 Room / Location:  MAIN OR 04 / [facility] MAIN OR    Anesthesia Start: 1331 Anesthesia Stop: 1503    Procedure: LAPAROSCOPIC CHOLECYSTECTOMY WITH INJECTION OF INDOCYANINE GREEN, UMBILICAL HERNIA REPAIR Diagnosis:       Biliary colic      (Biliary colic [K80.50])    Surgeons: Onelia Nuñez MD Anesthesiologist: Owen Jaquez MD    Anesthesia Type: general ASA Status: 2            Anesthesia Type: general    Vitals Value Taken Time   /83 03/10/25 1800   Temp 97.2 °F (36.2 °C) 03/10/25 1640   Pulse 86 03/10/25 1810   Resp 18 03/10/25 1640   SpO2 98 % 03/10/25 1810           Patient Location: PACU    Anesthesia Type: general    Airway Patency: patent    Postop Pain Control: adequate    Mental Status: mildly sedated but able to meaningfully participate in the post-anesthesia evaluation    Nausea/Vomiting: none    Cardiopulmonary/Hydration status: stable euvolemic    Complications: no apparent anesthesia related complications    Postop vital signs: stable    Dental Exam: Unchanged from Preop    Patient to be discharged from PACU when criteria met.

## 2025-03-14 ENCOUNTER — OFFICE VISIT (OUTPATIENT)
Age: 50
End: 2025-03-14
Attending: SPECIALIST
Payer: COMMERCIAL

## 2025-03-14 VITALS
RESPIRATION RATE: 16 BRPM | BODY MASS INDEX: 38.28 KG/M2 | SYSTOLIC BLOOD PRESSURE: 106 MMHG | HEART RATE: 70 BPM | OXYGEN SATURATION: 99 % | DIASTOLIC BLOOD PRESSURE: 69 MMHG | TEMPERATURE: 98 F | WEIGHT: 238.19 LBS | HEIGHT: 65.98 IN

## 2025-03-14 DIAGNOSIS — D50.0 IRON DEFICIENCY ANEMIA DUE TO CHRONIC BLOOD LOSS: ICD-10-CM

## 2025-03-14 DIAGNOSIS — Z00.00 WELL ADULT EXAM: ICD-10-CM

## 2025-03-14 DIAGNOSIS — D50.0 IRON DEFICIENCY ANEMIA SECONDARY TO BLOOD LOSS (CHRONIC): Primary | ICD-10-CM

## 2025-03-14 DIAGNOSIS — N92.0 MENORRHAGIA WITH REGULAR CYCLE: ICD-10-CM

## 2025-03-14 LAB
CHOLEST SERPL-MCNC: 168 MG/DL (ref ?–200)
EST. AVERAGE GLUCOSE BLD GHB EST-MCNC: 131 MG/DL (ref 68–126)
FASTING PATIENT LIPID ANSWER: YES
HBA1C MFR BLD: 6.2 % (ref ?–5.7)
HDLC SERPL-MCNC: 52 MG/DL (ref 40–59)
LDLC SERPL CALC-MCNC: 102 MG/DL (ref ?–100)
NONHDLC SERPL-MCNC: 116 MG/DL (ref ?–130)
TRIGL SERPL-MCNC: 75 MG/DL (ref 30–149)
TSI SER-ACNC: 1.68 UIU/ML (ref 0.55–4.78)
VIT D+METAB SERPL-MCNC: 10 NG/ML (ref 30–100)
VLDLC SERPL CALC-MCNC: 12 MG/DL (ref 0–30)

## 2025-03-14 NOTE — PROGRESS NOTES
[facility] Hematology Oncology Group Progress Note       Patient Name: Carroll Best   YOB: 1975  Medical Record Number: [id number]  Attending Physician: Bradley Denson M.D.     The 21st Century Cures Act makes medical notes like these available to patients in the interest of transparency. Please be advised this is a medical document. Medical documents are intended to carry relevant information, facts as evident, and the clinical opinion of the practitioner. The medical note is intended as peer to peer communication and may appear blunt or direct. It is written in medical language and may contain abbreviations or verbiage that are unfamiliar.     Date of Visit: 3/14/2025        Chief Complaint  Iron deficiency anemia - follow up and treatment.    History of Present Illness  Carroll Best is a 49 year old female who presented to the ED on 10/15/2022 with chest pain and dyspnea with exertion. Laboratory studies showed a Hb of 6.0 g/dl with low MCV, MCH, and MCHC. Patient also complained of right leg pain and swelling. D dimer was elevated resulting in CTA chest and right lower extremity Doppler ultrasound which showed bilateral lobar, segmental, and subsegmental pulmonary emboli and DVT from the right popliteal to distal posterior tibial veins.    Patient travelled by plane from [facility] on Thursday 10/13/2022. She noticed on the plane that her right leg was swollen below the knee and the pain in that leg was different than her usual right knee pain, though that had worsened over the last 4-5 weeks. Once she got off the plane, she had more dyspnea than she had before getting on the plane.     Patient was transfused PRBC and started on IV heparin. Upon discharge she was switched to apixaban. Laboratory workup showed iron deficiency anemia and she received IV iron sucrose.     She denies any previous history of VTE. She has no personal or family history of thrombophilia. No known family  history of early onset cardiovascular or cerebrovascular disease or miscarriages.     She reports chronic heavy menstrual periods and has uterine fibroids. During the admission, she was see by gynecology and started on medroxyprogesterone and advised to pursue hysterectomy.     A review of laboratory studies available in the electronic medical record system shows anemia dating back to at 11/2017; before that her hemoglobin was at the low end of normal. Red cell indices have been low since at least 08/2020. She states that she has tried oral iron but it causes excessive GI adverse effects.     Noted on her CTA chest is a round masslike area in the left breast measuring 1.6 x 1.8 cm with some areas of hyperdensity. The patient's last mammogram in the system is a screening mammogram from 02/2016 at which time there was noted a lobulated mass or masses in the left breast measuring 3.2 x 2 cm. Ultrasound confirmed the abnormality. 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 Patient had not undergone a repeat mammography until I ordered one in 11/2022.    As an outpatient, she received the test dose for iron dextran and had a reaction so instead received ferumoxytol 510 mg on 10/25/2022 and 11/02/2022.     Repeat right lower extremity Doppler ultrasound on 02/15/2023 showed resolution of the DVT. She remained on apixaban until medroxyprogesterone was discontinued in 03/2023.     Since then patient has had recurrent iron deficiency anemia requiring IV iron therapy. She reports a negative Colaguard but has not pursued endoscopic evaluation by gastroenterology. She continues to have heavy menstrual periods and is planning on consulting with gynecology to discuss options.     She complains of fatigue.     Past Medical History (historical data, reviewed by physician)  Iron deficiency anemia; DVT and PE (as above); osteoarthritis.    Past Surgical History  (historical data, reviewed by physician)  Cholecystectomy.     Family History (historical data, reviewed by physician)  Father gastric cancer late 50s.    Social History (historical data, reviewed by physician)  Denies tobacco use.       Current Medications   oxyCODONE 5 MG Oral Tab Take 1 tablet (5 mg total) by mouth every 4 (four) hours as needed for Pain. 15 tablet 0    ondansetron 4 MG Oral Tablet Dispersible Take 1 tablet (4 mg total) by mouth every 8 (eight) hours as needed for Nausea. 10 tablet 0    ibuprofen 800 MG Oral Tab Take 1 tablet (800 mg total) by mouth every 6 (six) hours as needed for Pain. 90 tablet 1    Multiple Vitamins-Minerals (MULTIVITAMIN GUMMIES ADULT OR) Take by mouth.      ergocalciferol 1.25 MG (75118 UT) Oral Cap Take 1 capsule (50,000 Units total) by mouth once a week. 12 capsule 1     Allergies   Ms. Best has No Known Allergies.    Vital Signs   Height: 167.6 cm (5' 5.98\") (03/14 1108)  Weight: 108 kg (238 lb 3.2 oz) (03/14 1108)  BSA (Calculated - sq m): 2.15 sq meters (03/14 1108)  Pulse: 70 (03/14 1136)  BP: 106/69 (03/14 1136)  Temp: 97.9 °F (36.6 °C) (03/14 1108)  Do Not Use - Resp Rate: --  SpO2: 99 % (03/14 1108)     Physical Examination   Constitutional Well developed and well nourished; in no apparent distress.  Head  Normocephalic and atraumatic.  Eyes  Conjunctiva clear; sclera anicteric.  ENMT  External nose normal; external ears normal.  Respiratory Normal effort; no respiratory distress; clear to auscultation bilaterally.   Cardiovascular Regular rate and rhythm.   Neurologic Motor and sensory grossly intact.  Psychiatric Mood and affect appropriate.    Laboratory   Recent Results (from the past 4 weeks)   IRON AND TIBC [E]    Collection Time: 02/25/25 11:48 AM   Result Value Ref Range    Iron 33 (L) 50 - 170 ug/dL    Transferrin 252 250 - 380 mg/dL    Total Iron Binding Capacity 319 250 - 425 ug/dL    % Saturation 10 (L) 15 - 50 %   FERRITIN [E]    Collection Time:  02/25/25 11:48 AM   Result Value Ref Range    Ferritin 7 (L) 50 - 306 ng/mL   CBC W/DIFF [E]    Collection Time: 02/25/25 11:48 AM   Result Value Ref Range    WBC 7.9 4.0 - 11.0 x10(3) uL    RBC 3.53 (L) 3.80 - 5.30 x10(6)uL    HGB 10.0 (L) 12.0 - 16.0 g/dL    HCT 30.9 (L) 35.0 - 48.0 %    .0 150.0 - 450.0 10(3)uL    MCV 87.5 80.0 - 100.0 fL    MCH 28.3 26.0 - 34.0 pg    MCHC 32.4 31.0 - 37.0 g/dL    RDW 13.4 %    Neutrophil Absolute Prelim 4.68 1.50 - 7.70 x10 (3) uL    Neutrophil Absolute 4.68 1.50 - 7.70 x10(3) uL    Lymphocyte Absolute 2.53 1.00 - 4.00 x10(3) uL    Monocyte Absolute 0.58 0.10 - 1.00 x10(3) uL    Eosinophil Absolute 0.08 0.00 - 0.70 x10(3) uL    Basophil Absolute 0.03 0.00 - 0.20 x10(3) uL    Immature Granulocyte Absolute 0.03 0.00 - 1.00 x10(3) uL    Neutrophil % 59.0 %    Lymphocyte % 31.9 %    Monocyte % 7.3 %    Eosinophil % 1.0 %    Basophil % 0.4 %    Immature Granulocyte % 0.4 %   POCT Pregnancy, Urine    Collection Time: 02/27/25 10:23 AM   Result Value Ref Range    POCT Urine Pregnancy Negative Negative   POCT Pregnancy, Urine    Collection Time: 03/10/25 11:52 AM   Result Value Ref Range    POCT Urine Pregnancy Negative Negative   Specimen to Pathology Tissue    Collection Time: 03/10/25  2:05 PM   Result Value Ref Range    Case Report       Surgical Pathology                                Case: [id number]                                   Authorizing Provider:  Onelia Nuñez MD       Collected:           03/10/2025 02:05 PM          Ordering Location:     [facility]    Received:            03/10/2025 03:42 PM          Pathologist:           Conchita Marques                                                            Specimens:   A) - Hernia, umbilical hernia sac                                                                   B) - Gallbladder                                                                           Final Diagnosis:       A. Umbilical hernia  sac, herniorrhaphy:  -Mature fibroadipose tissue, compatible with hernia sac.     B. Gallbladder, cholecystectomy:  -Mild chronic cholecystitis and cholelithiasis.         Embedded Images      Clinical Information       K80.50 Biliary Colic.         Gross Description       A: The specimen is received in formalin, labeled with the patient's name, demographics and \" umbilical hernia sac\". It consists of a 2.0 x 2.0 x 0.9 cm aggregate of cauterized fibromembranous tissue.  The specimen is submitted entirely in A1.    B: The specimen is received in formalin, labeled with the patient's demographics MRN, and \" gallbladder\". It consists of an intact purple-tan, smooth and glistening oblong gallbladder measuring 9.6 x 4.0 x 2.2 cm with a 0.3 cm in diameter surgically clamped cystic duct margin (inked blue) and a roughened hepatic bed.  Opening releases dark green, viscous bile admixed with a 3.8 x 1.7 x 0.9 cm aggregate of obstructing yellow, crushable calculi.  The mucosa is red-tan and velvety with focal yellow stippling.  Sectioning reveals a red-tan, rubbery wall averaging 0.1 cm in thickness.   The cystic duct margin (en face) and representative sections of the fundus, body, and neck are submitted in cassette B1.    (RD)        Interpretation Benign     TSH W Reflex To Free T4 [E]    Collection Time: 03/14/25 11:13 AM   Result Value Ref Range    TSH 1.678 0.550 - 4.780 uIU/mL   Lipid Panel    Collection Time: 03/14/25 11:13 AM   Result Value Ref Range    Cholesterol, Total 168 <200 mg/dL    HDL Cholesterol 52 40 - 59 mg/dL    Triglycerides 75 30 - 149 mg/dL    LDL Cholesterol 102 (H) <100 mg/dL    VLDL 12 0 - 30 mg/dL    Non HDL Chol 116 <130 mg/dL    Patient Fasting for Lipid? Yes    Hemoglobin A1C    Collection Time: 03/14/25 11:13 AM   Result Value Ref Range    HgbA1C 6.2 (H) <5.7 %    Estimated Average Glucose 131 (H) 68 - 126 mg/dL   Vitamin D, 25-Hydroxy    Collection Time: 03/14/25 11:13 AM   Result Value Ref  Range    Vitamin D, 25OH, Total 10.0 (L) 30.0 - 100.0 ng/mL     Impression and Plan   1.   Iron deficiency anemia: Laboratory studies are consistent with iron deficiency anemia. I recommend IV iron therapy. She is in agreement and will receive therapy today. I recommend repeating labs in 6 weeks and giving IV iron for any remaining deficit. For as long as patient is having heavy menstrual periods, she will be at risk for iron deficiency. I encouraged her to follow up with gynecology. I also recommended she consult with gastroenterology for endoscopic evaluation.     Planned Follow Up  As above.     Electronically Signed by:     Bradley Denson M.D.  System Medical Director, Oncology Services  [facility]

## 2025-03-14 NOTE — PROGRESS NOTES
Patient here for f/u and feraheme infusion.     Education Record    Learner:  Patient    Disease / Diagnosis: iron deficiency anemia     Barriers / Limitations:  None   Comments:    Method:  Discussion   Comments:    General Topics:  Medication, Side effects and symptom management, and Plan of care reviewed   Comments:    Outcome:  Shows understanding   Comments:

## 2025-03-14 NOTE — PROGRESS NOTES
Education Record    Learner:  Patient    Pt here for: CELINE    Barriers / Limitations:  None    Method:  Brief focused, printed material and  reinforcement    General Topics:  Plan of care reviewed    Outcome: Pt tolerated infusion with no c/o.     Here for MD and IV feraheme. Tolerated well. Back next week for second dose. Labs in 6 weeks. No MD f/u at this time. Labs for PCP drawn as well.

## 2025-03-20 ENCOUNTER — OFFICE VISIT (OUTPATIENT)
Age: 50
End: 2025-03-20
Attending: SPECIALIST
Payer: COMMERCIAL

## 2025-03-20 ENCOUNTER — OFFICE VISIT (OUTPATIENT)
Facility: LOCATION | Age: 50
End: 2025-03-20
Payer: COMMERCIAL

## 2025-03-20 VITALS
OXYGEN SATURATION: 96 % | SYSTOLIC BLOOD PRESSURE: 104 MMHG | TEMPERATURE: 98 F | RESPIRATION RATE: 16 BRPM | HEART RATE: 98 BPM | DIASTOLIC BLOOD PRESSURE: 61 MMHG

## 2025-03-20 VITALS
HEART RATE: 73 BPM | TEMPERATURE: 98 F | OXYGEN SATURATION: 97 % | SYSTOLIC BLOOD PRESSURE: 109 MMHG | DIASTOLIC BLOOD PRESSURE: 66 MMHG | RESPIRATION RATE: 16 BRPM

## 2025-03-20 DIAGNOSIS — D50.0 IRON DEFICIENCY ANEMIA SECONDARY TO BLOOD LOSS (CHRONIC): Primary | ICD-10-CM

## 2025-03-20 DIAGNOSIS — Z98.890 POST-OPERATIVE STATE: Primary | ICD-10-CM

## 2025-03-20 PROCEDURE — 3074F SYST BP LT 130 MM HG: CPT

## 2025-03-20 PROCEDURE — 3078F DIAST BP <80 MM HG: CPT

## 2025-03-20 PROCEDURE — 99024 POSTOP FOLLOW-UP VISIT: CPT

## 2025-03-20 NOTE — PROGRESS NOTES
Pt here for feraheme . Pt denies any issues or concerns.      Ordering Provider: Dr. Denson  Order Exp: ongoing     Pt tolerated infusion without difficulty or complaint. Reviewed next apt date/time: yes - patient will have labs rechecked in 6 weeks      Education Record  Learner:  Patient  Disease / Diagnosis: iron deficiency anemia  Barriers / Limitations:  None  Method:  Brief focused and Discussion  General Topics:  Medication and Plan of care reviewed  Outcome:  Shows understanding

## 2025-03-20 NOTE — PROGRESS NOTES
Post Operative Visit Note       Active Problems  1. Post-operative state         Chief Complaint   Chief Complaint   Patient presents with    Post-Op     PO - 3/12 w/ leh - LAPAROSCOPIC CHOLECYSTECTOMY WITH INJECTION OF INDOCYANINE GREEN, UMBILICAL HERNIA REPAIR. Pt is doing well. Pt reports no pain. Pt denies any symptoms.           History of Present Illness   The patient presents today for postoperative visit following laparoscopic cholecystectomy on 3/12/2025 with Dr. Nuñez    Patient is doing well overall postoperatively.  She denies abdominal pain, stating she only took the prescribed oxycodone during the first 2 days postoperatively.  She is tolerating diet without nausea and vomiting.  She denies constipation, diarrhea, fever, chills.      Allergies  Carroll has No Known Allergies.    Past Medical / Surgical / Social / Family History    The past medical and past surgical history have been reviewed by me today.     Past Medical History:    Allergic rhinitis    Anemia    iron deficiency    Deep vein thrombosis (HCC)    History of blood transfusion    3 years ago    Lipid screening    Obesity    Osteoarthritis    Mild in kneeds    Pulmonary embolism (HCC)    3 years ago.    Visual impairment    Glasses     Past Surgical History:   Procedure Laterality Date    Breast biopsy  2015    Unsure of exact year    Cholecystectomy  3/10/2025    Needle biopsy left  2016    benign      2007    Treat ectopic preg,non remval  2006       The family history and social history have been reviewed by me today.    Family History   Problem Relation Age of Onset    Arthritis Father         osteoarthritis     Social History     Socioeconomic History    Marital status:    Tobacco Use    Smoking status: Never    Smokeless tobacco: Never   Vaping Use    Vaping status: Never Used   Substance and Sexual Activity    Alcohol use: No    Drug use: No   Other Topics Concern    Caffeine Concern No    Stress Concern No     Weight Concern No    Special Diet No    Exercise No    Seat Belt No        Current Outpatient Medications:     ergocalciferol 1.25 MG (03878 UT) Oral Cap, Take 1 capsule (50,000 Units total) by mouth once a week., Disp: 24 capsule, Rfl: 0    ibuprofen 800 MG Oral Tab, Take 1 tablet (800 mg total) by mouth every 6 (six) hours as needed for Pain., Disp: 90 tablet, Rfl: 1    Multiple Vitamins-Minerals (MULTIVITAMIN GUMMIES ADULT OR), Take by mouth., Disp: , Rfl:     ergocalciferol 1.25 MG (36327 UT) Oral Cap, Take 1 capsule (50,000 Units total) by mouth once a week., Disp: 12 capsule, Rfl: 1    Polyethylene Glycol 3350 17 g Oral Powd Pack, Take 17 g by mouth daily as needed. (Patient not taking: Reported on 3/20/2025), Disp: 30 each, Rfl: 0    oxyCODONE 5 MG Oral Tab, Take 1 tablet (5 mg total) by mouth every 4 (four) hours as needed for Pain., Disp: 15 tablet, Rfl: 0    ondansetron 4 MG Oral Tablet Dispersible, Take 1 tablet (4 mg total) by mouth every 8 (eight) hours as needed for Nausea., Disp: 10 tablet, Rfl: 0      Review of Systems  A 10 point Review of Systems has been completed by me today and is negative except as above in the HPI.    Physical Findings   /66 (BP Location: Left arm, Patient Position: Sitting, Cuff Size: large)   Pulse 73   Temp 97.6 °F (36.4 °C) (Temporal)   Resp 16   LMP 02/18/2025 (Exact Date)   SpO2 97%   Gen/psych: alert and oriented, cooperative, no apparent distress  Cardiovascular: regular rate  Respiratory: respirations unlabored, no wheeze  Abdominal: soft, non-tender, non-distended, no guarding/rebound  Incisions: Laparoscopic incisions are clean, dry, intact without surrounding erythema, induration, or drainage.         Assessment/Plan  1. Post-operative state        The patient is doing well postoperatively.  Gently clean incisions with soap and water.  Continue general diet as tolerated.  Continue no heavy lifting more than 15 lbs until 4 weeks past procedure date  (4/7/2025). Discussed with patient importance of the lifting restriction, as this will help to prevent incisional hernia occurrence. Patient verbally understood and agrees to plan of care.  Pathology was discussed with the patient.  Return to work note was given to patient during visit.   All questions and concerns were addressed.  Follow up as needed.         No orders of the defined types were placed in this encounter.       Imaging & Referrals   None    Follow Up  Return if symptoms worsen or fail to improve.      BILL Cavazos  [facility] General Surgery  3/20/2025  8:45 AM

## 2025-07-07 ENCOUNTER — TELEPHONE (OUTPATIENT)
Age: 50
End: 2025-07-07

## 2025-07-07 ENCOUNTER — LAB ENCOUNTER (OUTPATIENT)
Dept: LAB | Age: 50
End: 2025-07-07
Attending: SPECIALIST
Payer: COMMERCIAL

## 2025-07-07 DIAGNOSIS — G89.29 CHRONIC PAIN OF RIGHT KNEE: ICD-10-CM

## 2025-07-07 DIAGNOSIS — D50.0 IRON DEFICIENCY ANEMIA SECONDARY TO BLOOD LOSS (CHRONIC): Primary | ICD-10-CM

## 2025-07-07 DIAGNOSIS — E55.9 VITAMIN D DEFICIENCY: ICD-10-CM

## 2025-07-07 DIAGNOSIS — M25.561 CHRONIC PAIN OF RIGHT KNEE: ICD-10-CM

## 2025-07-07 DIAGNOSIS — D50.0 IRON DEFICIENCY ANEMIA SECONDARY TO BLOOD LOSS (CHRONIC): ICD-10-CM

## 2025-07-07 LAB
BASOPHILS # BLD AUTO: 0.03 X10(3) UL (ref 0–0.2)
BASOPHILS NFR BLD AUTO: 0.3 %
DEPRECATED HBV CORE AB SER IA-ACNC: 6 NG/ML (ref 50–306)
EOSINOPHIL # BLD AUTO: 0.05 X10(3) UL (ref 0–0.7)
EOSINOPHIL NFR BLD AUTO: 0.6 %
ERYTHROCYTE [DISTWIDTH] IN BLOOD BY AUTOMATED COUNT: 14.5 %
HCT VFR BLD AUTO: 33.2 % (ref 35–48)
HGB BLD-MCNC: 10.4 G/DL (ref 12–16)
IMM GRANULOCYTES # BLD AUTO: 0.03 X10(3) UL (ref 0–1)
IMM GRANULOCYTES NFR BLD: 0.3 %
IRON SATN MFR SERPL: 7 % (ref 15–50)
IRON SERPL-MCNC: 27 UG/DL (ref 50–170)
LYMPHOCYTES # BLD AUTO: 2.61 X10(3) UL (ref 1–4)
LYMPHOCYTES NFR BLD AUTO: 29.2 %
MCH RBC QN AUTO: 26.9 PG (ref 26–34)
MCHC RBC AUTO-ENTMCNC: 31.3 G/DL (ref 31–37)
MCV RBC AUTO: 85.8 FL (ref 80–100)
MONOCYTES # BLD AUTO: 0.58 X10(3) UL (ref 0.1–1)
MONOCYTES NFR BLD AUTO: 6.5 %
NEUTROPHILS # BLD AUTO: 5.63 X10 (3) UL (ref 1.5–7.7)
NEUTROPHILS # BLD AUTO: 5.63 X10(3) UL (ref 1.5–7.7)
NEUTROPHILS NFR BLD AUTO: 63.1 %
PLATELET # BLD AUTO: 368 10(3)UL (ref 150–450)
RBC # BLD AUTO: 3.87 X10(6)UL (ref 3.8–5.3)
TOTAL IRON BINDING CAPACITY: 386 UG/DL (ref 250–425)
TRANSFERRIN SERPL-MCNC: 305 MG/DL (ref 250–380)
WBC # BLD AUTO: 8.9 X10(3) UL (ref 4–11)

## 2025-07-07 PROCEDURE — 82306 VITAMIN D 25 HYDROXY: CPT

## 2025-07-07 PROCEDURE — 82728 ASSAY OF FERRITIN: CPT

## 2025-07-07 PROCEDURE — 85025 COMPLETE CBC W/AUTO DIFF WBC: CPT

## 2025-07-07 PROCEDURE — 36415 COLL VENOUS BLD VENIPUNCTURE: CPT

## 2025-07-07 PROCEDURE — 83540 ASSAY OF IRON: CPT

## 2025-07-07 PROCEDURE — 83550 IRON BINDING TEST: CPT

## 2025-07-07 NOTE — TELEPHONE ENCOUNTER
Pt called stated she was at [place] urgent care to have labs done for Dr. Denson, but she was told there are no orders in the system     Please put orders in the system, pt said she will wait at the clinic. Please call when complete

## 2025-07-11 ENCOUNTER — TELEPHONE (OUTPATIENT)
Age: 50
End: 2025-07-11

## 2025-07-11 ENCOUNTER — OFFICE VISIT (OUTPATIENT)
Age: 50
End: 2025-07-11
Attending: STUDENT IN AN ORGANIZED HEALTH CARE EDUCATION/TRAINING PROGRAM
Payer: COMMERCIAL

## 2025-07-11 VITALS
DIASTOLIC BLOOD PRESSURE: 60 MMHG | HEART RATE: 80 BPM | WEIGHT: 225 LBS | BODY MASS INDEX: 36 KG/M2 | OXYGEN SATURATION: 99 % | TEMPERATURE: 98 F | RESPIRATION RATE: 18 BRPM | SYSTOLIC BLOOD PRESSURE: 104 MMHG

## 2025-07-11 VITALS
DIASTOLIC BLOOD PRESSURE: 67 MMHG | RESPIRATION RATE: 16 BRPM | HEART RATE: 77 BPM | OXYGEN SATURATION: 97 % | SYSTOLIC BLOOD PRESSURE: 107 MMHG

## 2025-07-11 DIAGNOSIS — E55.9 VITAMIN D DEFICIENCY: ICD-10-CM

## 2025-07-11 DIAGNOSIS — E53.8 VITAMIN B12 DEFICIENCY: ICD-10-CM

## 2025-07-11 DIAGNOSIS — N92.0 MENORRHAGIA WITH REGULAR CYCLE: ICD-10-CM

## 2025-07-11 DIAGNOSIS — D50.0 IRON DEFICIENCY ANEMIA SECONDARY TO BLOOD LOSS (CHRONIC): Primary | ICD-10-CM

## 2025-07-11 LAB
VIT B12 SERPL-MCNC: 343 PG/ML (ref 211–911)
VIT D+METAB SERPL-MCNC: 20.6 NG/ML (ref 30–100)

## 2025-07-11 RX ORDER — IBUPROFEN 800 MG/1
800 TABLET, FILM COATED ORAL EVERY 6 HOURS PRN
Qty: 90 TABLET | Refills: 1 | Status: SHIPPED | OUTPATIENT
Start: 2025-07-11

## 2025-07-11 RX ORDER — ERGOCALCIFEROL 1.25 MG/1
50000 CAPSULE, LIQUID FILLED ORAL WEEKLY
Qty: 24 CAPSULE | Refills: 0 | Status: SHIPPED | OUTPATIENT
Start: 2025-07-11

## 2025-07-11 NOTE — TELEPHONE ENCOUNTER
Please review; protocol failed/No Protocol      Last Vitamin D  Component  Ref Range & Units (hover) 3/14/25 11:13 AM   Vitamin D, 25OH, Total 10.0 Low

## 2025-07-11 NOTE — PROGRESS NOTES
[facility] Progress Note       Patient Name: Carroll Best   YOB: 1975  Medical Record Number: [id number]  Attending Physician: Bradley Denson M.D.     The 21st Century Cures Act makes medical notes like these available to patients in the interest of transparency. Please be advised this is a medical document. Medical documents are intended to carry relevant information, facts as evident, and the clinical opinion of the practitioner. The medical note is intended as peer to peer communication and may appear blunt or direct. It is written in medical language and may contain abbreviations or verbiage that are unfamiliar.     Date of Visit: 3/14/2025        Chief Complaint  Iron deficiency anemia - follow up and treatment.    History of Present Illness  Carroll Best is a 49 year old female who presented to the ED on 10/15/2022 with chest pain and dyspnea with exertion. Laboratory studies showed a Hb of 6.0 g/dl with low MCV, MCH, and MCHC. Patient also complained of right leg pain and swelling. D dimer was elevated resulting in CTA chest and right lower extremity Doppler ultrasound which showed bilateral lobar, segmental, and subsegmental pulmonary emboli and DVT from the right popliteal to distal posterior tibial veins.    Patient travelled by plane from [facility] on Thursday 10/13/2022. She noticed on the plane that her right leg was swollen below the knee and the pain in that leg was different than her usual right knee pain, though that had worsened over the last 4-5 weeks. Once she got off the plane, she had more dyspnea than she had before getting on the plane.     Patient was transfused PRBC and started on IV heparin. Upon discharge she was switched to apixaban. Laboratory workup showed iron deficiency anemia and she received IV iron sucrose.     She denies any previous history of VTE. She has no personal or family history of thrombophilia. No known family  history of early onset cardiovascular or cerebrovascular disease or miscarriages.     She reports chronic heavy menstrual periods and has uterine fibroids. During the admission, she was see by gynecology and started on medroxyprogesterone and advised to pursue hysterectomy.     A review of laboratory studies available in the electronic medical record system shows anemia dating back to at 11/2017; before that her hemoglobin was at the low end of normal. Red cell indices have been low since at least 08/2020. She states that she has tried oral iron but it causes excessive GI adverse effects.     Noted on her CTA chest is a round masslike area in the left breast measuring 1.6 x 1.8 cm with some areas of hyperdensity. The patient's last mammogram in the system is a screening mammogram from 02/2016 at which time there was noted a lobulated mass or masses in the left breast measuring 3.2 x 2 cm. Ultrasound confirmed the abnormality. 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 Patient had not undergone a repeat mammography until I ordered one in 11/2022.    As an outpatient, she received the test dose for iron dextran and had a reaction so instead received ferumoxytol 510 mg on 10/25/2022 and 11/02/2022.     Repeat right lower extremity Doppler ultrasound on 02/15/2023 showed resolution of the DVT. She remained on apixaban until medroxyprogesterone was discontinued in 03/2023.     Since then patient has had recurrent iron deficiency anemia requiring IV iron therapy. She reports a negative Colaguard but has not pursued endoscopic evaluation by gastroenterology. She continues to have heavy menstrual periods and is planning on consulting with gynecology to discuss options.     She complains of fatigue.     Past Medical History (historical data, reviewed by physician)  Iron deficiency anemia; DVT and PE (as above); osteoarthritis.    Past Surgical History  (historical data, reviewed by physician)  Cholecystectomy.     Family History (historical data, reviewed by physician)  Father gastric cancer late 50s.    Social History (historical data, reviewed by physician)  Denies tobacco use.       Current Medications   ibuprofen 800 MG Oral Tab Take 1 tablet (800 mg total) by mouth every 6 (six) hours as needed for Pain. 90 tablet 1    Multiple Vitamins-Minerals (MULTIVITAMIN GUMMIES ADULT OR) Take by mouth.      ergocalciferol 1.25 MG (51541 UT) Oral Cap Take 1 capsule (50,000 Units total) by mouth once a week. 12 capsule 1     Allergies   Ms. Best has no known allergies.    Vital Signs   Height: --  Weight: 102.1 kg (225 lb) (07/11 1035)  BSA (Calculated - sq m): --  Pulse: 80 (07/11 1035)  BP: 104/60 (07/11 1035)  Temp: 98.3 °F (36.8 °C) (07/11 1035)  Do Not Use - Resp Rate: --  SpO2: 99 % (07/11 1035)     Physical Examination   Constitutional Well developed and well nourished; in no apparent distress.  Head  Normocephalic and atraumatic.  Eyes  Conjunctiva clear; sclera anicteric.  ENMT  External nose normal; external ears normal.  Respiratory Normal effort; no respiratory distress; clear to auscultation bilaterally.   Cardiovascular Regular rate and rhythm.   Neurologic Motor and sensory grossly intact.  Psychiatric Mood and affect appropriate.    Laboratory   Recent Results (from the past 4 weeks)   CBC W/DIFF [E]    Collection Time: 07/07/25  1:50 PM   Result Value Ref Range    WBC 8.9 4.0 - 11.0 x10(3) uL    RBC 3.87 3.80 - 5.30 x10(6)uL    HGB 10.4 (L) 12.0 - 16.0 g/dL    HCT 33.2 (L) 35.0 - 48.0 %    .0 150.0 - 450.0 10(3)uL    MCV 85.8 80.0 - 100.0 fL    MCH 26.9 26.0 - 34.0 pg    MCHC 31.3 31.0 - 37.0 g/dL    RDW 14.5 %    Neutrophil Absolute Prelim 5.63 1.50 - 7.70 x10 (3) uL    Neutrophil Absolute 5.63 1.50 - 7.70 x10(3) uL    Lymphocyte Absolute 2.61 1.00 - 4.00 x10(3) uL    Monocyte Absolute 0.58 0.10 - 1.00 x10(3) uL    Eosinophil Absolute 0.05 0.00 -  0.70 x10(3) uL    Basophil Absolute 0.03 0.00 - 0.20 x10(3) uL    Immature Granulocyte Absolute 0.03 0.00 - 1.00 x10(3) uL    Neutrophil % 63.1 %    Lymphocyte % 29.2 %    Monocyte % 6.5 %    Eosinophil % 0.6 %    Basophil % 0.3 %    Immature Granulocyte % 0.3 %   IRON AND TIBC [E]    Collection Time: 07/07/25  1:50 PM   Result Value Ref Range    Iron 27 (L) 50 - 170 ug/dL    Transferrin 305 250 - 380 mg/dL    Total Iron Binding Capacity 386 250 - 425 ug/dL    % Saturation 7 (L) 15 - 50 %   FERRITIN [E]    Collection Time: 07/07/25  1:50 PM   Result Value Ref Range    Ferritin 6 (L) 50 - 306 ng/mL     Impression and Plan   1.   Iron deficiency anemia: Laboratory studies are consistent with iron deficiency anemia. I recommend IV iron therapy. She is in agreement and will receive therapy today.   Feraheme given on:  10/25/22, 11/2/22 12/8/22, 12/14/22  3/7/23, 3/13/23  11/3/23, 11/20/23 2/7/24, 2/13/24 7/2/24, 7/14/24  3/14/25, 3/20/25  7/11/25    Repeat labs every 6 - 8 weeks.  For as long as patient is having heavy menstrual periods, she will be at risk for iron deficiency encouraged her to follow up with gynecology. I also recommended she consult with gastroenterology for endoscopic evaluation - not completed as of yet. Will send GI RN Simin[employer] message to coordinate.     2.   History of right leg DVT and PE in October 2022: Patient completed 3 months of therapeutic anticoagulation.      3.   Vitamin B12 deficiency in November 2022: Resolved. Oral replacement recommended prior and patient endorses not taking a daily supplement.  Check B12 level today.  Intrinsic factor antibody was negative. I recommend EGD by gastroenterology to evaluate for atrophic gastritis.     4.  History of vitamin D deficiency:  March 2025 10.  On weekly course of vitamin D 50,000IU x 6 months per PCP.  Follow-up with PCP.     Planned Follow Up  Call as needed.  Follow-up in 6 months, scheduled on 1/12/26, with labs OP  prior.    Electronically Signed by:     Francisco J Delgado PA-C  [facility]

## 2025-07-11 NOTE — TELEPHONE ENCOUNTER
I spoke to the patient  She has to look at her schedule and she was about to leave to start work.  She is going to call back on Monday  Ok to offer her any open res24 spot with [employer] when she calls back.

## 2025-07-11 NOTE — TELEPHONE ENCOUNTER
Refill passed per Clinic protocol.  Requested Prescriptions   Pending Prescriptions Disp Refills    ibuprofen 800 MG Oral Tab 90 tablet 1     Sig: Take 1 tablet (800 mg total) by mouth every 6 (six) hours as needed for Pain.       Non-Narcotic Pain Medication Protocol Passed - 7/11/2025  8:57 AM        Passed - In person appointment or virtual visit in the past 6 mos or appointment in next 3 mos     Recent Outpatient Visits              3 months ago Iron deficiency anemia secondary to blood loss (chronic)    [facility]    Office Visit    3 months ago Post-operative state    [facility] [place],Guillermina Gross PA    Office Visit    3 months ago Iron deficiency anemia secondary to blood loss (chronic)    [facility]    Office Visit    3 months ago Iron deficiency anemia secondary to blood loss (chronic)    [facility] [place], Bradley Menjivar MD    Office Visit    4 months ago Chronic pain of both knees    [facility], Ann Garcia MD    Office Visit          Future Appointments         Provider Department Appt Notes    Today Francisco J Delgado PA [facility] [place] F/U    Today [facility] INFRN 7 Ghada BARAHONA [facility] [place] FERAHEME-PIV-SL 1 OF 2    In 5 days [facility] INFRN 1 Ghada BARAHONA [facility] [place] FERAHEME-PIV-SL 2 OF 2                    Passed - Medication is active on med list          ergocalciferol 1.25 MG (74912 UT) Oral Cap 24 capsule 0     Sig: Take 1 capsule (50,000 Units total) by mouth once a week.       There is no refill protocol information for this order

## 2025-07-11 NOTE — TELEPHONE ENCOUNTER
Please contact patient to schedule consult for iron deficiency anemia and vitamin B12 deficiency.  Need to rule out GI causes of blood loss despite menorrhagia and assess for atrophic gastritis.    Heme:  [place].    Thank you

## 2025-07-11 NOTE — PROGRESS NOTES
Patient arrives to infusion center for Ferahme. Patient denies any issues or concerns.      Ordering Provider: Bradley Denson MD  Order Expires: one dose remaining in order.      Patient appeared to tolerate infusion without difficulty or complaint. No s/s of adverse reaction noted. VSS post infusion. Reviewed next apt date/time.    Education Record  Learner:  Patient  Disease / Diagnosis: CELINE  Barriers / Limitations:  None  Method:  Discussion and Reinforcement  General Topics:  Medication, Side effects and symptom management, and Plan of care reviewed  Outcome:  Shows understanding

## 2025-07-16 ENCOUNTER — OFFICE VISIT (OUTPATIENT)
Age: 50
End: 2025-07-16
Attending: STUDENT IN AN ORGANIZED HEALTH CARE EDUCATION/TRAINING PROGRAM
Payer: COMMERCIAL

## 2025-07-16 VITALS
DIASTOLIC BLOOD PRESSURE: 82 MMHG | SYSTOLIC BLOOD PRESSURE: 118 MMHG | TEMPERATURE: 98 F | HEART RATE: 69 BPM | RESPIRATION RATE: 16 BRPM | OXYGEN SATURATION: 99 %

## 2025-07-16 DIAGNOSIS — D50.0 IRON DEFICIENCY ANEMIA SECONDARY TO BLOOD LOSS (CHRONIC): Primary | ICD-10-CM

## 2025-07-16 NOTE — PROGRESS NOTES
Patient arrives to infusion center for Ferahme 2/2 . Patient denies any issues or concerns.      Ordering Provider: Bradley Denson MD      Patient appeared to tolerate infusion without difficulty or complaint. No s/s of adverse reaction noted. VSS post infusion. Reviewed next apt date/time. Yes     Education Record  Learner:  Patient  Disease / Diagnosis: CELINE  Barriers / Limitations:  None  Method:  Discussion and Reinforcement  General Topics:  Medication, Side effects and symptom management, and Plan of care reviewed  Outcome:  Shows understanding

## 2025-07-17 ENCOUNTER — PATIENT MESSAGE (OUTPATIENT)
Dept: FAMILY MEDICINE CLINIC | Facility: CLINIC | Age: 50
End: 2025-07-17

## 2025-07-17 DIAGNOSIS — Z11.1 SCREENING-PULMONARY TB: Primary | ICD-10-CM

## 2025-07-23 ENCOUNTER — LAB ENCOUNTER (OUTPATIENT)
Dept: LAB | Facility: HOSPITAL | Age: 50
End: 2025-07-23
Attending: STUDENT IN AN ORGANIZED HEALTH CARE EDUCATION/TRAINING PROGRAM
Payer: COMMERCIAL

## 2025-07-23 DIAGNOSIS — Z11.1 SCREENING-PULMONARY TB: Primary | ICD-10-CM

## 2025-07-23 PROCEDURE — 36415 COLL VENOUS BLD VENIPUNCTURE: CPT

## 2025-07-23 PROCEDURE — 86480 TB TEST CELL IMMUN MEASURE: CPT

## 2025-07-25 LAB
M TB IFN-G CD4+ T-CELLS BLD-ACNC: 0.02 IU/ML
M TB TUBERC IFN-G BLD QL: NEGATIVE
M TB TUBERC IGNF/MITOGEN IGNF CONTROL: >10 IU/ML
QFT TB1 AG MINUS NIL: 0 IU/ML
QFT TB2 AG MINUS NIL: 0 IU/ML

## (undated) DIAGNOSIS — E55.9 VITAMIN D DEFICIENCY: ICD-10-CM

## (undated) DIAGNOSIS — G89.29 CHRONIC PAIN OF RIGHT KNEE: ICD-10-CM

## (undated) DIAGNOSIS — M25.561 CHRONIC PAIN OF RIGHT KNEE: ICD-10-CM

## (undated) DEVICE — 40580 - THE PINK PAD - ADVANCED TRENDELENBURG POSITIONING KIT: Brand: 40580 - THE PINK PAD - ADVANCED TRENDELENBURG POSITIONING KIT

## (undated) DEVICE — TROCAR: Brand: KII SHIELDED BLADED ACCESS SYSTEM

## (undated) DEVICE — GLOVE SUR 6.5 SENSICARE PI PIP GRN PWD F

## (undated) DEVICE — ANTIBACTERIAL UNDYED BRAIDED (POLYGLACTIN 910), SYNTHETIC ABSORBABLE SUTURE: Brand: COATED VICRYL

## (undated) DEVICE — SOLUTION IRRIG 1000ML 0.9% NACL USP BTL

## (undated) DEVICE — MINI ENDOCUT SCISSOR TIP, DISPOSABLE: Brand: RENEW

## (undated) DEVICE — SUT MCRYL 4-0 18IN PS-2 ABSRB UD 19MM 3/8 CIR

## (undated) DEVICE — DALE ABDOMINAL BINDER, 12" WIDE, STRETCHES TO FIT 30"-45", 1 PER BOX.: Brand: DALE ABDOMINAL BINDER

## (undated) DEVICE — TRADITIONAL MARYLAND DISSECTOR TIP, DISPOSABLE: Brand: RENEW

## (undated) DEVICE — #15 STERILE STAINLESS BLADE: Brand: STERILE STAINLESS BLADES

## (undated) DEVICE — LAP CHOLE/APPY CDS-LF: Brand: MEDLINE INDUSTRIES, INC.

## (undated) DEVICE — [HIGH FLOW INSUFFLATOR,  DO NOT USE IF PACKAGE IS DAMAGED,  KEEP DRY,  KEEP AWAY FROM SUNLIGHT,  PROTECT FROM HEAT AND RADIOACTIVE SOURCES.]: Brand: PNEUMOSURE

## (undated) DEVICE — LAPAROVUE VISIBILITY SYSTEM LAPAROSCOPIC SOLUTIONS: Brand: LAPAROVUE

## (undated) DEVICE — ZZ--CONVERTED-TO-500976-PENCIL SMK EVAC L10FT MPLR BLDE JAW OPN

## (undated) DEVICE — SUT COAT VCRL 2-0 27IN SH ABSRB UD 26MM 1/2

## (undated) DEVICE — L-HOOK CAUTERY PROBE TIP, DISPOSABLE: Brand: RENEW

## (undated) DEVICE — ENDOPATH ULTRA VERESS INSUFFLATION NEEDLES WITH LUER LOCK CONNECTORS: Brand: ENDOPATH

## (undated) DEVICE — ADHESIVE SKIN TOP FOR WND CLSR DERMBND ADV

## (undated) DEVICE — SHEET,DRAPE,40X58,STERILE: Brand: MEDLINE

## (undated) DEVICE — SYRINGE MED 10ML LL TIP W/O SFTY DISP

## (undated) DEVICE — TROCAR: Brand: KII® SLEEVE

## (undated) DEVICE — TROCAR: Brand: KII FIOS FIRST ENTRY

## (undated) DEVICE — GLOVE SUR 6.5 SENSICARE PI PIP CRM PWD F

## (undated) DEVICE — Device: Brand: SUTURE PASSOR PRO

## (undated) DEVICE — SLEEVE COMPR MD KNEE LEN SGL USE KENDALL SCD

## (undated) DEVICE — SUT COAT VCRL + 0 54IN ABSRB UD ANTIBACT

## (undated) DEVICE — COVER,LIGHT,CAMERA,HARD,1/PK,STRL: Brand: MEDLINE

## (undated) DEVICE — CLIP APPLIER WITH CLIP LOGIC TECHNOLOGY: Brand: ENDO CLIP III

## (undated) DEVICE — GRABBER GRASPER TIP, DISPOSABLE: Brand: RENEW

## (undated) DEVICE — SYRINGE MED 20ML STD LUERLOCK TIP AUTOCLV RIG

## (undated) DEVICE — POUCH SPECIMEN WIRE 6X3 250ML

## (undated) DEVICE — SUT ETHBND XL 1 30IN NABSRB GRN 36MM CT-1 1/2 CIR TAPR

## (undated) DEVICE — BINDER ABD UNISX 9IN 62IN L AND XL UNIV

## (undated) NOTE — LETTER
01/30/20        Carroll Best  [address]      Dear Molina Hughes,    [address] records indicate that you have outstanding lab work and or testing that was ordered for you and has not yet been completed:  Orders Placed This Enc

## (undated) NOTE — LETTER
[facility] FOR BLOOD OR BLOOD COMPONENTS      In the course of your treatment, it may become necessary to administer a transfusion of blood or blood components. This form provides basic information concerning this procedure and, if signed by you, authorizes its performance by qualified medical personnel. DESCRIPTION OF PROCEDURE:  Blood is introduced into one of your veins, commonly in the arm, using a sterilized disposable needle. The amount of blood transfused, and whether the transfusion will be of blood or blood components is a judgment the physician will make based on your particular needs. RISKS:  The transfusion is a common procedure of low risk. MINOR AND TEMPORARY REACTIONS ARE NOT UNCOMMON, including a slight bruise, swelling or local reaction in the area where the needle pierces your skin, or a non-serious reaction to the transfused material itself, including headache, fever or a mild skin reaction, such as rash. Serious reactions are possible, though very unlikely and include severe allergic reaction (shock)  and destruction (hemolysis) of transfused blood cells. Infectious diseases which are known to be transmitted by blood transfusion include CERTAIN TYPES OF VIRAL HEPATITIS, a viral infection of the liver, HUMAN IMMUNODEFICIENCY VIRUS (HIV-1,2) infection, a viral infection known to cause ACQUIRED IMMUNODEFICIENCY SYNDROME (AIDS) AS WELL AS CERTAIN OTHER BACTERIAL, VIRAL AND PARASITIC DISEASES. While a minimal risk of acquiring an infectious disease from transfused blood exists, in accordance with Federal and State law all due care has been taken in donor selection and testing to avoid transmission of disease. ALTERNATIVES:  If loss of blood poses serious threats in the course of your treatment, THERE IS NO EFFECTIVE ALTERNATIVE TO BLOOD TRANSFUSION.  However, if you have any further questions on this matter, your physician will fully explain the alternatives to you if it has not already been done. I,Carroll Best, have read/had read to me the above. I understand the matters bearing on the decision whether or not to authorize a transfusion of blood or blood components. I have no questions which have not been answered to my full satisfaction.  I hereby consent to such transfusion as  my physician may deem necessary or advisable in the course of my treatment.        _______________   __________________________________________________  Date     Signature of Patient, Parent or Legal Guardian      ([employer])      __________________________________________  Witness to Signature (title or relationship to patient)    Patient Name: Miah Malhotra     : 10/9/1975                 Printed: October 15, 2022     Medical Record #: [id number]                    Page 1 of 1

## (undated) NOTE — Clinical Note
1/19/2017          To Whom It May Concern:    Robson Lanza is currently under my medical care and she was seen at my office today   She is ok to go back to work on 1/20/2017      If you require additional information please contact our office.         Si

## (undated) NOTE — LETTER
AUTHORIZATION FOR SURGICAL OPERATION OR OTHER PROCEDURE    1.  I hereby authorize Dr. Chaeny Corporal, and [facility] staff assigned to my case to perform the following operation and/or procedure at the [facility]: Right knee aspiration and injec to Patient:           []  Parent    Responsible person                          []  Spouse  In case of minor or                    [] Other  _____________   Incompetent name:  __________________________________________________

## (undated) NOTE — Clinical Note
Spoke with patient for TCM today--appt question encounter sent to office staff for appt clarification--thank you.

## (undated) NOTE — LETTER
7/18/2025          To Whom It May Concern:    Carroll Best is my current patient. She has been examined by me within the past 6 months. I can attest that she is under good health at this time.    If you require additional information please contact our office.      Sincerely,      Ann Lacy MD          Document generated by:  Kaity HAYWARD RN

## (undated) NOTE — LETTER
[facility] Noriega [address], [place], [place]  [address]  INFORMED CONSENT FOR TRANSFUSION OF BLOOD OR BLOOD PRODUCTS  My physician has informed me of the nature, purpose, benefits and risks of transfusion for blood and blood components that he/she may deem necessary during my treatment or hospitalization. He/she has also discussed alternatives to receiving blood from the voluntary blood supply with me, such as self-donation (autologous) and directed donation (blood donated by family or friends to be used specifically for me). I further understand that while the [address] [place] [address] will attempt to supply any autologous or directed donor blood prior to transfusion of blood from the routine blood supply, medical circumstances may require that other or additional blood components may be required for my care. In giving consent, I acknowledge that my physician has also informed me that despite careful screening and testing in accordance with national and regional regulations, there is still a small risk of transmission of infectious agents including hepatitis, HIV-1/2, cytomegalovirus and other viruses or diseases as yet unknown for which licensed definitive screening tests do not currently exist. Additionally, my physician has informed me of the potential for transfusion reactions not related to an infectious agent. [  ]  Check here for Recurring Outpatient Transfusion Therapy (valid for 1 year) In addition to the above, my physician has informed me that I shall receive numerous transfusions over a period of time and that these can lead to other increased risks. I hereby authorize the transfusion of blood and/or blood products to me as deemed necessary and ordered by physicians participating in my care.  My physician has given me the opportunity to ask questions and any questions asked have been answered to my satisfaction  __________________________________________ ______________________________________________  (Signature of Patient)                                                            (Responsible party in case of Minor,                                                                                                 Incompetent, or unconscious Patient)  ___________________________________________       ________ ______________________________________  (Relationship to Patient)                                                       (Signature of Witness)  ______________________________________________________________________________________________   (Date)                                                                           (Time)  REFUSAL OF CONSENT FOR BLOOD TRANSFUSIONS   Sign only if Refusing   [  ] I understand refusal of blood or blood products as deemed necessary by my physician may have serious consequences to my condition to include possible death.  I hereby assume responsibility for my refusal and release the hospital, its personnel, and my physicians from any responsibility for the consequences of my refusal.    ________________________________________________________________________________  (Signature of Patient)                                                         (Responsible Party/Relationship to Patient)    ________________________________________________________________________________  (Signature of Witness)                                                       (Date/Time)     Patient Name: Izzy [place]     : 10/9/1975                 Printed: 10/15/2022      Medical Record #: [id number]                                 Page 1 of 1

## (undated) NOTE — LETTER
5/14/2024        Carroll Best        [address] [address]         To Whom It May Concern,    Carroll Best is under my medical care.  Her last annual physical was 12/6/2023.    If you require any more information, please contact our office.      Sincerely,      Ann Lacy MD  [address] [place] [address]  Ph: [phone]  Fax: [phone]        Document electronically generated by:  Ann Lacy MD

## (undated) NOTE — LETTER
3/20/2025    Return to Work    Name: Carroll Best        : 10/9/1975    To Whom It May Concern,    Carroll Best had surgery on 3/10/2025.    The patient may return to work on 3/21/2025 with the following restrictions:  - No lifting over 15 pounds until 4 weeks after procedure date (2025).          If there are any further questions, regarding this patient's care, please contact the patient directly.    Sincerely,    BILL Cavazos

## (undated) NOTE — LETTER
7/18/2025      To Whom It May Concern:    Carroll Best is my current patient. She has been examined by me within the past 6 months and I can attest that she is in good health.  If you require additional information please contact our office.      Sincerely,    Ann Lacy MD  [address] [place] [id number]  Ph: [phone]  Fax: [phone]       Document generated by:  Kaity HAYWARD RN

## (undated) NOTE — LETTER
5/14/2024        Carroll Best        [address] [address]         To Whom It May Concern,    Carroll Best is under my medical care.  Her last annual physical was 12/6/2023.  She is in good health and is fit to work.  No physical limitations to perform job duties.  No communicable diseases based on exam/labs.      If you require any more information, please contact our office.      Sincerely,      Ann Lacy MD  [address] [place] [id number]  Ph: [phone]  Fax: [phone]        Document electronically generated by:  Ann Lacy MD

## (undated) NOTE — MR AVS SNAPSHOT
After Visit Summary   5/15/2018    Laura Cabrera    MRN: [id number]           Visit Information     Date & Time  5/15/2018 12:30 PM Provider  Martita Jeffries, [facility] Dept.  Phone  [phone] all VIDEO VISITS  Average cost  $35*    e-VISITS  Average cost  $35*      [address]  Monday - Friday  10:00 am - 10:00 pm  Saturday - Sunday  10:00 am - 4:00 pm      [address]  Monday - Fr

## (undated) NOTE — MR AVS SNAPSHOT
[facility]  [address] [address]  [phone]               Thank you for choosing us for your health care visit with Trisha Kirkland MD.  We are glad to serve you and happy to provide you with this summary of your Impaired cilia: Extra mucus impairs cilia, causing congestion and wheezing. Smoking makes the problem worse. Making a diagnosis  A physical exam, health history, and certain tests help your healthcare provider make the diagnosis.   Health history  Your he · Take them as directed. For instance, some medicines should be taken with food. · Ask your provider or pharmacist what side effects are common, and what to do about them. Follow-up care  You should see your provider again in 2 to 3 weeks.  By this time, Bronchitis often occurs with a cold or the flu virus. The airways become inflamed (red and swollen). There is a deep “hacking” cough from the extra mucus.  Other symptoms may include:  · Wheezing  · Chest discomfort  · Shortness of breath  · Mild fever  A s · Ask your provider about using cough medicine, pain and fever medicine, or a decongestant. Antibiotics  Most cases of bronchitis are caused by cold or flu viruses. Antibiotics don’t treat viral illness.  Taking antibiotics when they are not needed increas Today's Vital Signs     BP Pulse Temp Height Weight BMI    118/77 mmHg 69 98.7 °F (37.1 °C) (Oral) 5' 6\" (1.676 m) 217 lb (98.431 kg) 35.04 kg/m2         Current Medications          This list is accurate as of: 1/19/17  3:31 PM.  Always use your most rec Make half your plate fruits and vegetables Highly refined, white starches including white bread, rice and pasta   Eat plenty of protein, keep the fat content low Sugars:  sodas and sports drinks, candies and desserts   Eat plenty of low-fat dairy products

## (undated) NOTE — LETTER
Date & Time: 1/2/2025, 7:22 PM  Patient: Carroll Best  Encounter Provider(s):    Archana Byers APRN       To Whom It May Concern:    Carroll Best was seen and treated in our department on 1/2/2025. She should not return to work until fever free for 24 hours without the use of fever reducing medication and symptoms have improved .    If you have any questions or concerns, please do not hesitate to call.        _____________________________  Physician/APC Signature

## (undated) NOTE — LETTER
4/20/2020          To Whom It May Concern:    Parag Westbrook is currently under my medical care . This patient is under observation for symptoms of the covid virus infection.   She is currently symptomatic ,she will need to be quarantined for the nex

## (undated) NOTE — LETTER
3/20/2025    Return to Work    Name: Carroll Best        : 10/9/1975    To Whom It May Concern,    Carroll Best had surgery on 3/10/2025.    The patient may return to work on 3/21/2025 with no restrictions.        If there are any further questions, regarding this patient's care, please contact the patient directly.    Sincerely,    BILL Cavazos